# Patient Record
Sex: FEMALE | Race: WHITE
[De-identification: names, ages, dates, MRNs, and addresses within clinical notes are randomized per-mention and may not be internally consistent; named-entity substitution may affect disease eponyms.]

---

## 2020-11-12 NOTE — ST COGNITIVE LINGUISTIC EVAL
Speech Evaluation-General


Medical Diagnosis


CVA


Onset Date:  Oct 14, 2020





Therapy Diagnosis


Therapy Diagnosis:  Dysarthria, Dysphagia, Expressive Aphasia





Precautions


Precautions/Isolations:  Airborne Isolation





Referral


Referring Physician:  Dr. Petit





Medical History


Pertinent Medical History:  CAD, COPD, DM, PVD


Reviewed History:  Yes





Social History


Current Living Status:  Alone





Speech PLF-Current Status


Prior Level of Function





Patient lives alone and was independent for her daily needs prior to


illness.





Subjective


Patient was pleasant and cooperative with the cognitive assessment.





Language Eval: Auditory


Comprehends Simple Yes/No Ques:  Functional


Indent/Objects Multiple Fields:  Functional


Ident/Pics in Multiple Fields:  Functional


Follows 1-Step Commands:  Functional


Follows Complex Directions:  Mild


Follows General Conversations:  Functional





Language Eval: Verbal Language


Completes Spontaneous Greeting:  Functional


Produces Auto, Serial Info:  Mild


Imitates Simple Words/Phrases:  Mild


Word Finding:  Mild


Requests Basic Needs:  Mild


States Basic Personal Info:  Mild


Expresses Complex Ideas:  Moderate





Objective Cognitive Domain


Attention:  WNL


Memory:  Mild


Problem Solving:  Moderate, Mild


Executive Functions:  Moderate


Composite Severity Rating:  Moderate


Clock Drawing Severity Rating:  Moderate





Objective


Formal/Standardized Tests


[facility] Status ([facility]), WAB sub tests, informal speech tasks


Results


18/30 within Moderate Dementia Disorder range of function, however due to 

patient's expressive aphasia this score would be considered lower than actual, 

Moderate speech disorder


Oral Motor/Speech Production


Patient presents with dysarthria as well as expressive aphasia


Impression


Patient is a pleasant 74 y/o female who presents to the ARU s/p CVA. The patient

was given the SLUMS, WAB subtests and informal speech tasks. The patient scored 

an 18/30 which may be a lower score than actual. The patient's speech is clear 

and intelligible for 75% of her communication. She also mumbles and has 

difficulty expressing herself at times. Patient also has dysphagia and takes in 

some by oral and also the PEG tube is in place for nutrition. Patient will 

receive skilled ST to focus on expression, dysphagia and dysarthria.





Speech Patient Assess


Expression of Ideas/Wants:  Frequently (2)


Understanding Verbal Content:  Usually Understands (3)


Brief Interview-Mental Status:  Yes


Repetition of Three Words:  Three (3)


Temporal Orientation: Year:  Correct (3)


Temporal Orientation: Month:  Accurate within 5 days(2)


Temporal Orientation: Day:  Correct (1)


Recall : Wear to say "Sock":  No, could not recall (0)


Recall : Color:  No, could not recall (0)


Recall : Bed:  Yes,after cueing (1)


Memory/Recall Ability:  Current season, That he or she is in a hsp/hsp unit





Speech Short Term Goals


Short Term Goals


Short Term Goals


1) The patient will complete cognitive tasks related to memory, safety awareness

and problem solving at 80% with minimal cues.


2) The patient will complete speech tasks  to improve intelligibility at 80% 

with minimal cues.


3) The patient will complete confrontational naming tasks at 90% with minimal 

cues.


4) The patient will complete OME for improved oral status for safe oral intake 

at 80% or greater with minimal cues.





Speech Long Term Goals


Long Term Goals


Patient will improve communication abilities and safe oral intake in order to 

return to prior level.





Speech-Plan


Patient/Family Goals


Patient/Family Goals:  


Patient's discharge plans are unknown at this time.





Treatment Plan


Speech Therapy Treatment Plan:  Continue Plan of Care


Treatment Duration:  Nov 25, 2020


Frequency:  4 times per week (Patient will receive ST 4-5x per week)


Estimated Hrs Per Day:  .5 hour per day


Rehab Potential:  Fair


Barriers to Learning:  


Patient's recent CVA and related disabilities, age


Pt/Family Agrees to Plan:  Yes





Safety Risks/Education


Teaching Recipient:  Patient


Teaching Methods:  Discussion


Response to Teaching:  Verbalize Understanding


Education Topics Provided:  


Safety within her room, utilization of the call light as needed





Time


Speech Therapy Time In:  15:30


Speech Therapy Time Out:  16:00


Total Billed Time:  30


Billed Treatment Time


1, SE[facility] [address]            Nov 12, 2020 16:10

## 2020-11-12 NOTE — NUR
UNABLE TO ANSWER ADMISSION QUESTIONS DUE TO EXPRESSIVE APHASIA. DAUGHTER DAKOTA CALLED AND 
ANSWERED QUESTIONS. PATIENT AND DAUGHTER DO NOT KNOW IF SHE HAS HAD PNEUMONIA VACCINE.  
DAKOTA STATES PATIENT'S PHYSICIAN IS AT [facility] IN [place]. CLINIC CALLED AND THEY 
SAID THEY HAVE NOT SEEN HER SINCE 2016. UNABLE TO FILL OUT PNEUMONIA VACCINE INTERVENTION. 
DAKOTA ALSO SAYS SHE SHOULD BE A FULL CODE PRESENTLY, BUT SHE IS GOING TO TALK TO BROTHER 
ABOUT DPOA AND MAKING HER A DNR.

## 2020-11-12 NOTE — NUR
Admitted to room 229 , with an admitting diagnosis of CVA , on 11-12-20 from [facility] , 
accompanied by .TAYLOR CENTENO introduced to surroundings, call light, bed controls, phone, 
TV, temperature control, lights, meal times, smoking policy, visitor policy, side rail 
policy, bathrooms and showers.  Patient Rights given to patient in the 
handbook.TAYLOR CENTENO verbalizes understanding that [facility] is not responsible for 
the loss or damage to any personal effects or valuables that are kept in the patients 
posession during their hospitalization.  The following Patient Care Plans were discussed 
with the : Discharge Planning, ,, and . TAYLOR CENTENO verbalizes understanding of 
Interdisciplinary Patient Education. Patient and/or family were informed about the Rapid 
Response Team and its purpose. Patient received Patient Rights Booklet, which includes 
Privacy Act Statement and Data Collection Information Summary.

## 2020-11-12 NOTE — NUR
MED REC WAS ENTERED USING THE DISCHARGE ORDERS FROM [facility] IN [place]. AFTER 
MEDICATIONS ARE CONTINUED I WILL SPEAK WITH THE PT AND MAKE ANY CHANGES TO THE NOTES/MED REC 
IF NEEDED

-------------------------------------------------------------------------------

Addendum: 11/16/20 at 1556 by ANJELICA PEPPER [facility]

-------------------------------------------------------------------------------

SPOKE WITH THE PT TODAY REGARDING HER HOME MEDICATIONS, UNFORTUNATELY TAYLOR WAS NOT ABLE TO 
GIVE ME ANY INFORMATION. SHE WANTED ME TO REACH OUT TO HER DAUGHTER (DAKOTA). I CALLED 
DAKOTA AND HAD TO LEAVE A MESSAGE. 

-------------------------------------------------------------------------------

Addendum: 11/17/20 at 1114 by ANJELICA PEPPER [facility]

-------------------------------------------------------------------------------

I SPOKE WITH DAKOTA AND SHE THOUGHT TAYLOR WAS TAKING A MEDICATION FOR HER BLOOD SUGAR. WHEN 
I ASKED WHICH PRACTITIONER OR OFFICE THE PT WENT TO, DAKOTA NAMED [facility] IN [place]. I 
ALSO ASKED WHICH PHARMACY TAYLOR USED AND DAKOTA DIDNT KNOW. 

I CALLED [facility] IN [place] AND THEY INDICATED THAT TAYLOR IS NOT A CURRENT PT AND THEY 
HAD NOT SEEN HER SINCE 2016

FOR THE REASONS LISTED ABOVE I TOOK ALL MEDICATIONS OFF THE MED REC AND SET IT TO "NO 
MEDICATIONS". I ALSO DID LET DR. BAH KNOW OF WHAT I HAVE FOUND.

## 2020-11-12 NOTE — PHYSICAL THERAPY EVALUATION
PT Evaluation-General


Medical Diagnosis


Admission Date


2020 at 12:30


Medical Diagnosis:  CVA


Onset Date:  Oct 14, 2020





Therapy Diagnosis


Therapy Diagnosis:  Impaired mobility and strength





Precautions


Precautions/Isolations:  Fall Prevention, Standard Precautions





Weight Bear Status


Right Lower Extremity:  Right


Full Weight Bearing


Left Lower Extremity:  Left


Full Weight Bearing





Referral


Physician:  Damaso


Reason for Referral:  Evaluation/Treatment





Medical History


Pertinent Medical History:  CAD, COPD, DM, PVD


Reviewed History:  Yes





Social History


Home:  Single Level


Current Living Status:  Alone


Entry Into Home:  Level Entry


PT Steps Into Home:  0


PT Steps Inside Home:  0





Prior


Prior Level of Function


SCALE: Activities may be completed with or without assistive devices.





6-Indepedent-patient completes the activity by him/herself with no assistance 

from a helper.


5-Set-up or Clean-up Assistance-helper sets up or cleans up; patient completes 

activity. [place] assists only prior to or  


    following the activity.


4-Supervision or Touching Assistance-helper provides verbal cues and/or 

touching/steadying and/or contact guard assistance as patient completes 

activity. Assistance may be provided   


    throughout the activity or intermittently.


3-Partial/Moderate Assistance-helper does LESS THAN HALF the effort. [place] 

lifts, holds or supports trunk or limbs, but provides less than half the effort.


2-Substantial/Maximal Assistance-helper does MORE THAN HALF the effort. [place] 

lifts or holds trunk or limbs and provides more than half the effort.


2-Zuozuiiyp-yobqus does ALL the effort. Patient does none of the effort to 

complete the activity. Or, the assistance of 2 or more helpers is required for 

the patient to complete the  


    activity.


If activity was not attempted, code reason:


7-Patient Refused.


9-Not Applicable-not attempted and the patient did not perform the activity 

before the current illness, exacerbation or injury.


10-Not Attempted due to Environmental Limitations-(lack of equipment, weather 

restraints, etc.).


88-Not Attempted due to Medical Conditions or Safety Concerns.


Bed Mobility:  6


Transfers (B,C,W/C):  6


Gait:  6


Stairs:  6


Indoor Mobility (Ambulation):  Independent


Stairs:  Independent


Prior Devices Use:  Other-see list below (Cane)


Pt own walker that she does not use; pt has recently stated to use a cane but it

is unclear how recent and for what reason





PT Evaluation-Current


Subjective


Pt presents supine in bed. Pt agrees to PT. Pt reports no pain; later in session

pt experienced pain at catheter insertion with movement that diminished.





Pt/Family Goals


Return Home





Objective


Patient Orientation:  Person, Place, Time, Eyes Open, Situation, Mumbles


Attachments:  Oxygen, Roldan Catheter





ROM/Strength


ROM Lower Extremities


WFL


Strength Lower Extremities


R hip flex: <3/5 


L hip flex: 4/5


R knee ext: 3/5


L knee ext: 5/5





Sensory


Vision:  


Hearing:  Functional


Sensation Right Lower Extremit:  Impaired


Sensation Left Lower Extremity:  Intact


Sensation Lower Extremities


LLE sensation intact to light touch L2-S2


RLE sensation impaired to light touch; unclear on how well pt was able to follow

assessment instructions





Transfers


Roll Left & Right (QC):  3


Sit to Lying (QC):  3


Lying to Sitting/Side of Bed(Q:  2


Sit to Stand (QC):  3


Chair/Bed-to-Chair Xfer(QC):  3


Toilet Transfer (QC):  3


Car Transfer (QC):  3


Pt required min assist to roll; mod assist for sit->lying; max assist for lying-

>EOB. PT needs min assist with sit to stand; mod assist for chair to chair, 

toilet, and car transfers.





Gait


Does the Patient Walk?:  Yes


Mode of Locomotion:  Both


Anticipated Mode of Locomotion:  Both


Walk 10 feet (QC):  88


Walk 50 ft with 2 Turns(QC):  88


Walk 150 ft (QC):  88


Walking 10ft/uneven surface-QC:  88


Distance:  3'


Gait Assistive Device:  Parallel Bars


Comments/Gait Description


Pt ambulated 3' inside of parallel bars; pt took small shuffling steps and 

required assistance with weightshifting as well as progression of RLE.





Wheelchair Training


Does the Pt Use a Wheelchair?:  Yes


Distance:  150'x3


Wheel 50 ft with 2 turns (QC):  1


Wheel 150 ft (QC):  1


Type of Wheelchair:  Manual


Pt is unable to reach ground with BLE to propel and steer WC





Stairs


1 Step (curb) (QC):  88


4 Steps (QC):  88


12 Steps (QC):  88





Balance


Sitting Static:  Fair


Sitting Dynamic:  Poor


Standing Static:  Poor


 Standing Dynamic:  Poor


Picking up an Object (QC):  88





Treatment


Seated LAQs and ankle pumps x10


Showering and ADLs.





Assessment/Needs


Pt demonstrates ability to stand and bear weight but struggles to then pivot to 

transfer; pt appeared fearful of reaching for armrest with LUE and instead would

hug onto therapist. Pt inconsistent with her ability to follow instructions with

exercises.


Rehab Potential:  Fair





PT Short Term Goals


Short Term Goals


Time Frame:  2020


Roll Left & Right:  4


Sit to lyin


Lying to sitting on side of be:  3


Chair/bed-to-chair transfer:  3


Walk 10 feet:  3


Does pt use a wc or scooter:  Yes


Wheel 50ft w/2 turns:  3


Wheel 150 feet:  3





PT Long Term Goals


Long Term Goals


PT Long Term Goals Time Frame:  Dec 3, 2020


Roll Left & Right (QC):  6


Sit to Lying (QC):  6


Lying-Sitting on Side/Bed(QC):  6


Sit to Stand (QC):  4


Chair/Bed-to-Chair Xfer(QC):  4


Toilet Transfer (QC):  4


Car Transfer (QC):  4


Does the Patient Walk:  Yes


Walk 10 feet (QC):  3


Walk 50ft with 2 Turns (QC):  3


Walk 150 ft (QC):  88


Walking 10ft on Uneven Surface:  3


1 Step (curb) (QC):  3


4 Steps (QC):  88


12 Steps (QC):  88


Picking up an Object (QC):  88


Does the Pt use WC or Scooter?:  Yes


Wheel 50 feet with 2 turns (QC:  6


Wheel 150 feet:  6





PT Plan


Problem List


Problem List:  Activity Tolerance, Functional Strength, Safety, Balance, Gait, 

Transfer, Bed Mobility, ROM





Treatment/Plan


Treatment Plan:  Continue Plan of Care


Treatment Plan:  Bed Mobility, Education, Functional Activity Nona, Functional 

Strength, Group Therapy, Gait, Safety, Therapeutic Exercise, Transfers


Treatment Duration:  Dec 4, 2020


Frequency:  At least 5 of 7 days/Wk (IRF)


Estimated Hrs Per Day:  1.5 hours per day


Patient and/or Family Agrees t:  Yes





Safety Risks/Education


Patient Education:  Gait Training, Transfer Techniques, Correct Positioning, W/C

Management, Safety Issues


Teaching Recipient:  Patient


Teaching Methods:  Demonstration, Discussion


Response to Teaching:  Reinforcement Needed





Discharge Recommendations


Plan


Pt will work on bed mobility, transfers, balance, gait training, and therapeutic

exercises.





Time/GCodes


Time In:  1320


Time Out:  1435


Total Billed Treatment Time:  75


Total Billed Treatment


1 visit


EVM 10'


FA 50'


EX 15'





Pt Eval [id number]; Cotreated with OT 5095-1832; Pt focused on mobility and 

transfers while OT assisted with showering and ADLs.











MELODY RAO PT                2020 14:35

## 2020-11-12 NOTE — OCCUPATIONAL THERAPY EVAL
OT Evaluation-General/PLF


Medical Diagnosis


Admission Date


2020 at 12:30


Medical Diagnosis:  CVA


Onset Date:  Oct 14, 2020





Therapy Diagnosis


Therapy Diagnosis:  Right sided weakness, aphasia





Precautions


Precautions/Isolations:  Fall Prevention, Standard Precautions





Referral


Physician:  Damaso Kam Reason:  Activity Tolerance, Self Care, Evaluation/Treatment, 

Strengthening/ROM





Medical History


Pertinent Medical History:  CAD, COPD, DM, PVD


Additional Medical History


Myopathy, Dysphagia, dysarthria


Current History


Pt. 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


Reviewed History:  Yes





Social History


Home:  Single Level


Current Living Status:  Alone


Entry Into Home:  Level Entry


 Steps Into Home:  0


 Steps Inside Home:  0





ADL-Prior Level of Function


SCALE: Activities may be completed with or without assistive devices.





6-Indepedent-patient completes the activity by him/herself with no assistance 

from a helper.


5-Set-up or Clean-up Assistance-helper sets up or cleans up; patient completes 

activity. [place] assists only prior to or  


    following the activity.


4-Supervision or Touching Assistance-helper provides verbal cues and/or 

touching/steadying and/or contact guard assistance as patient completes 

activity. Assistance may be provided   


    throughout the activity or intermittently.


3-Partial/Moderate Assistance-helper does LESS THAN HALF the effort. [place] 

lifts, holds or supports trunk or limbs, but provides less than half the effort.


2-Substantial/Maximal Assistance-helper does MORE THAN HALF the effort. [place] 

lifts or holds trunk or limbs and provides more than half the effort.


6-Wgfovfxrb-muezeh does ALL the effort. Patient does none of the effort to compl

ete the activity. Or, the assistance of 2 or more helpers is required for the 

patient to complete the  


    activity.


If activity was not attempted, code reason:


7-Patient Refused.


9-Not Applicable-not attempted and the patient did not perform the activity 

before the current illness, exacerbation or injury.


10-Not Attempted due to Environmental Limitations-(lack of equipment, weather 

restraints, etc.).


88-Not Attempted due to Medical Conditions or Safety Concerns.


ADL PLOF Comments


Pt. was independent with daily skills.  She worked part time at [employer].  She 

reports that she was in the [employer].


Self Care:  Independent


Functional Cognition:  Independent


DME/Equipment Comments


Pt. verbalizes that she has a walker she doesn't use, and was using a cane 

occasionally prior to this incident.


Occupation:  Part time at [employer].


Drive Self:  Yes





OT Current Status


Subjective


No pain reported.





Appearance


Pt. in bed.  Alert.  Agrees to work with therapy.





Mental Status/Objective


Patient Orientation:  Person, Place


Attachments:  Roldan Catheter, Oxygen





Current


Glasses/Contacts:  Yes


Upper Extremity ROM


Left- intact


Right- impaired.  Pt. is able to lift right arm at shoulder, approximately 30 

degrees,but this gets more difficult the more tired she becomes.  Pt. able to 

slightly grasp with right hand.


Upper Extremity Strength


Left- WFL


Right- 2+/5  strength


Edema:  Pt. does have edema in right hand.





ADL-Treatment


Eating (QC):  7 (Pt. to be evaluated by ST.)


Oral Hygiene (QC):  1 (OT handed pt. toothbrush while she was in shower, with no

toothpaste.  Pt. put the handle of it in her mouth.  OT attempted [place] assist, 

but pt. unable to understand.  OT brushes pt's teeth for her.)


Shower/Bathe Self (QC):  2 (Pt. is able to wash her chest and right arm.  She 

requires assistance to wash all other parts.  Pt. showers while seated on shower

chair.)


Upper Body Dressing (QC):  88 (Pt. verbalizes that she is tired.  OT assists 

with doffing/donning clean hospital gown.)


Lower Body Dressing (QC):  88


On/Off Footwear (QC):  1 (Pt. able to extend her legs for OT to doff/don slipper

socks.)


Toileting Hygiene (QC):  1 (Catheter.  Pt. verbalizes that she is continent of 

stool, but pt. slightly incontinent in shower.)





Other Treatments


OT/PT completed partial co-treatment due to pt's fatigue level, as well as need 

for skilled assistance x 2.  Pt. arrived via ambulance, but agrees to 

participate.  OT focused on ADL skills and UE assessement while PT focused on 

transfers and mobility.  Pt. practiced car transfer, transfer to shower chair 

and shower, as well as ambulation in parallel bars.  Pt. greatly fatigued 

throughout.  Required max x 2 to ambulate 10 feet in bars.  Pt. demonstrates 

fear of movement and falling while standing, and "pushes" when transferring to 

left side.  Pt. motivated and pleasant.  Seems aware of deficits, but 

demonstrates some right sided neglect.  Pt. able to use clear words at beginning

of treatment.  Noted by end of treatment, due to fatigue, her words were garbled

and less intelligible.  Pt. was transferred to lift chair after session with max

assist.  All needs met.





Education


OT Patient Education:  Correct positioning, Exercise program, Modified ADL 

techniques, Progress toward Goal/Update tx plan, Purpose of tx/functional 

activities, Reviewed precautions, Rehab process, Transfer techniques


Teaching Recipient:  Patient


Teaching Methods:  Demonstration, Discussion


Response to Teaching:  Verbalize Understanding, Return Demonstration, Reinfo

rcement Needed





OT Short Term Goals


Short Term Goals


Time Frame:  2020


Eating:  3


Oral hygiene:  3


Toileting hygiene:  3


Shower/bathe self:  3


Upper body dressin


Lower body dressing:  3


Putting on/taking off footwear:  3





OT Long Term Goals


Long Term Goals


Time Frame:  Dec 10, 2020


Eating (QC):  4


Oral Hygiene (QC):  4


Toileting Hygiene (QC):  3


Shower/Bathe Self (QC):  3


Upper Body Dressing (QC):  5


Lower Body Dressing (QC):  4


On/Off Footwear (QC):  4


Additional Goals:  1-Demonstrate ADL Tasks, 2-Verbalize Understanding, 3-

ImproveStrength/Nona


1=Demonstrate adherence to instructed precautions during ADL tasks.


2=Patient will verbalize/demonstrate understanding of assistive 

devices/modifications for ADL.


3=Patient will improve strength/tolerance for activity to enable patient to 

perform ADL's.





OT Education/Plan


Problem List/Assessment


Assessment:  Decreased Activ Tolerance, Decreased UE Strength, Dependent 

Transfers, Edema, Impaired Bed Mobility, Impaired Cognition, Impaired 

Coordination, Impaired Funct Balance, Impaired I ADL's, Impaired Self-Care 

Skills, Restricted Funct UE ROM, Visual-Perceptual Deficit





Discharge Recommendations


Plan/Recommendations:  Continue POC


Therapy Discharge Recommendati:  Post Acute OT


Comment


Equipment needs and discharge location to be determined.





Treatment Plan/Plan of Care


Treatment,Training & Education:  Yes


Patient would benefit from OT for education, treatment and training to promote 

independence in ADL's, mobility, safety and/or upper extremity function for 

ADL's.


Plan of Care:  ADL Retraining, Functional Mobility, Group Exercise/Act as Ind, 

UE Funct Exercise/Act


Treatment Duration:  Dec 10, 2020


Frequency:  At least 5 of 7 days/Wk (IRF)


Estimated Hrs Per Day:  1.5 hours per day


Agreement:  Yes


Rehab Potential:  Fair





Time/GCodes


Start Time:  12:55


Stop Time:  14:35


Total Time Billed (hr/min):  80


Billed Treatment Time


1699-1402 1, EVH x 15minutes


3250-1224 Med student assessment, no charge


[id number] PT eval, no charge


[id number] 1, ADL x 30minutes, FA x 15minutes, Ex x 20minutes (Co-treatment with 

PT.  Please see above note for designated roles.)











ROSANNA LOVING OT           2020 15:47

## 2020-11-12 NOTE — PM&R POST ADMISSION ASSESSMENT
PM&R HP


Date of Visit:  2020


Time of Visit:  12:45


History of Present Illness


CC: CVA





HPI: This is a 75yoWF patient who presents from LTAC in [place] where she was 

admitted after suffering a CVA with right sided weakness and dysarthria and 

expressive aphasia. PEG tube is in place. Patient denies pain. Patient had an 

uneventful transport from [place]. Roldan cath is in place and unsure if she has 

retention. Last BM is unknown at this time. Reviewed below information from 

medical student.





Verification and Attestation of Medical Student E/M Service





A medical student performed and documented this service in my presence. I 

reviewed and verified all information documented by the medical student and made

modifications to such information, when appropriate. I personally performed the 

physical exam and medical decision making. 





 Nancy Bah, 2020,20:59


  








History and Physical by Luke Bainbridge, MSIII





Subjective:





CC: CVA





HPI: Lupe Williamson is a 75 year old white female who presents to inpatient rehab

from [facility] after sustaining a CVA involving the left 

occipital artery after undergoing CABG x 3 vessels on . PMH is 

significant for CAD, diabetes mellitus, PVD, and COPD. Following surgery she was

transferred to [facility]. While a patient there she was found to

have right lower extremity coldness and pulses in the RLE were undetectable with

doppler. She was started on an IV heparin drip and was transferred to [place] 

and vascular surgery was consulted. She underwent a bilateral lower extremity 

angiogram and was recommended to continue medical management. She was then 

transferred back to [facility] for further care. As a result of 

the stroke she now has right sided weakness, dysarthria, expressive aphasia, and

dysphagia. Prior to her CVA she was independent in all activities of daily 

living without limitations. She is admitted to inpatient rehab here at [facility] with critical illness myopathy and will require intensive PT, OT, SLP, 

and nursing care. 





PMH: CAD, diabetes mellitus, PVD, and COPD-non 02 dependent





PSH: CABG x 3 vessels, PEG per transfer paperwork-not present currently





Allergies: NKDA





Medications: ASA 81 mg po Q day, Lipitor 40 mg Q day, Coreg 3.25 mg BID, Lantus 

15 units SubQ Q day, Lispro sliding scale, Entresto 1 tab po Q day





FH: Negative for cancer or heart disease





SH: Former smoker. Single. Lives alone. Works part time at [employer]. 





ROS: Negative for fevers, chills, chest pain, SOB, nausea, vomiting, diarrhea, 

headache, blurry vision, dysuria. Positive for Right sided weakness. 





Objective:





T     P     RR     BP    SPO2





General: Chronically ill appearing female appearing stated age in no apparent 

distress. 





HEENT: Normocephalic/Atraumatic. PERRL. EOMI. Pharynx without erythema or exuda

zachery. Poor dentition.





Cardiac: RRR. No murmur, rub, or gallop. Cap refill < 2 seconds bilateral hands.

Radial pulses +2/4 bilat. Pedal pulses +1/4bilat. Trace edema BLE. 





Respiratory: Lungs CTAB, but diminished bibasilar. No rales, rhonchi or wheezes.

No accessory muscle use. O2 via NC. 





GI: Abdomen soft and nontender to palpation. No rebound or guarding. Bowel 

sounds normoactive all quadrants. 





: Roldan catheter present





Skin: Warm and dry. Midline sternotomy incision healing, without drainage, edges

well approximated, open to air. 





MSK/Neuro: Left palmar grasp 5/5. Right palmar grasp 3/5. Left lower extrem hip 

flexion 5/5. Right lower extrem  hip flexion 3/5. Left foot plantarflexion 5/5. 

Right foot plantarflexion 3/5. Sensation to bilateral upper and lower 

extremities intact. Cranial nerves 2-12 grossly intact. Expressive aphasia  

noted. Patient leaning slightly to the right while lying in bed. 





Assessment:





Critical illness myopathy


S/P CVA involving left occipital artery


S/P 3 vessel CABG


Debility


Expressive aphasia


Dysarthria


Right sided weakness


Diabetes mellitus


COPD


CAD





Plan:





1. Ensure adequate anticoagulation


2. PT, OT, and SLP eval's and treat


3. Oxygen via NC- wean as tolerated


4. Resume Home medications


5. Fall precautions


6. Dietary consult


7. DVT prophylaxis








Past Medical-Social-Family Hx


Past Med/Social Hx:  Reviewed Nursing Past Med/Soc Hx, Reviewed and Corrections 

made


Patient Social History


Marrital Status:  single


Employed/Student:  student, part-time


Alcohol Use:  Denies Use


Recreational Drug Use:  No


Smoking Status:  Current Everyday Smoker


Type Used:  Cigarettes


Physical Abuse Screen:  No


Sexual Abuse:  No


Recent Foreign Travel:  No


Contact w/other who traveled:  No


Recent Hopitalizations:  Yes (CVA AND BYPASS SURGERY)


Recent Infectious Disease Expo:  No





Immunizations Up To Date


Date of Influenza Vaccine:  Nov 10, 2020





Seasonal Allergies


Seasonal Allergies:  No





Past Medical History


Surgeries:  Cardiac, CABG, Open Heart Surgery


Respiratory:  COPD


Currently Using CPAP:  No


Currently Using BIPAP:  No


Cardiac:  Cardiomyopathy, Coronary Artery Disease, High Cholesterol, 

Hypertension


Neurological:  Stroke


Pregnant:  No


Sexually Transmitted Disease:  No


HIV/AIDS:  No


Musculoskeletal:  Arthritis


Endocrine:  Diabetes, Non-Insulin dep


Hearing Impairment:  Denies


History of Blood Disorders:  No





Family History





Patient reports no known family medical history.





Prior Level of Function


Bed Mobility:  6


Transfers:  6


Gait:  6


Stairs:  6


Indoor Mobility (Ambulation):  Independent


Stairs:  Independent


Prior Devices Use:  Other-see list below (Cane)


Self Care:  Independent


Functional Cognition:  Independent


Occupation:  Part time at [employer].


Drive Self:  Yes





Current Level of Fuctioning


Roll Left to Right:  3


Sit to Lying:  3


Lying to Sitting/Side of Bed:  2


Sit to Stand:  3


Chair/Bed-to-Chair Xfer:  3


Car Transfer:  3


Does the Patient Walk:  Yes


Mode of Locomotion:  Both


Anticipated Mode of Locomotion:  Both


Walk 10 feet:  88


Walk 50 ft with 2 Turns:  88


Walk 150 ft:  88


Walking 10ft on uneven surface:  88


Gait Assistive Device:  Parallel Bars


Does the Pt Use a Wheelchair:  Yes


Wheelchair Distance:  150'x3


Wheel 50 ft with 2 turns:  1


Wheel 150 ft:  1


Type of Wheelchair:  Manual


1 Step (curb):  88


4 Steps:  88


12 Steps:  88


Picking up an Object:  88


Eatin (Pt. to be evaluated by ST.)


Oral Hygiene:  1 (OT handed pt. toothbrush while she was in shower, with no 

toothpaste.  Pt. put the handle of it in her mouth.  OT attempted [place] assist, 

but pt. unable to understand.  OT brushes pt's teeth for her.)


Shower/Bathe Self:  2 (Pt. is able to wash her chest and right arm.  She 

requires assistance to wash all other parts.  Pt. showers while seated on shower

chair.)


Upper Body Dressin (Pt. verbalizes that she is tired.  OT assists with 

doffing/donning clean hospital gown.)


Lower Body Dressin


On/Off Footwear:  1 (Pt. able to extend her legs for OT to doff/don slipper 

socks.)


Toileting Hygiene:  1 (Catheter.  Pt. verbalizes that she is continent of stool,

but pt. slightly incontinent in shower.)





PM&R Allergy/Meds/Data Review


Allergies


Coded Allergies:  


     No Known Drug Allergies (Unverified , 20)





Home Medications


Scheduled


Aspirin (Aspirin), 81 MG PO DAILY, (Reported)


Atorvastatin Calcium (Atorvastatin Calcium), 40 MG PO DAILY, (Reported)


Budesonide (Budesonide), 0.5 MG IH BID, (Reported)


Carvedilol (Carvedilol), 3.125 MG PO BID, (Reported)


Famotidine (Acid Reducer (FAMOTIDINE)), 20 MG PO Q12H, (Reported)


Insulin Glargine,Hum.rec.anlog (Lantus), 25 UNIT SQ HS, (Reported)


Insulin Lispro (Humalog), UNIT SQ Q6H, (Reported)





Scheduled PRN


Acetaminophen (Tylenol), 650 MG PO Q6H PRN for PAIN-MILD (1-4), (Reported)


Albuterol Sulfate (Albuterol Sulfate), 2.5 MG INH Q4H PRN for SHORTNESS OF 

BREATH, (Reported)


Ipratropium/Albuterol Sulfate (Iprat-Albut 0.5-3(2.5) mg/3 ml), 3 ML IH Q6H PRN 

for SHORTNESS OF BREATH, (Reported)





Current Medications


Current Medications


Reviewed





Laboratory Data


Laboratory Tests


20 16:54: Glucometer 73





Review of Systems


Constitutional:  see HPI, malaise, weakness


Gastrointestinal:  constipation


Genitourinary:  other (retention)


Psychiatric/Neurological:  Anxiety, Depressed


All Other Systems Reviewed


Negative Unless Noted:  Yes





Physical Exam


Physical Exam


Vital Signs





Vital Signs - First Documented








 20





 15:00


 


Temp 35.9


 


Pulse 80


 


Resp 20


 


B/P (MAP) 140/81 (100)


 


Pulse Ox 99


 


O2 Delivery Nasal Cannula


 


O2 Flow Rate 1.50





Capillary Refill :


Height, Weight, BMI


Height: '"


Weight: lbs. oz. kg; 25.27 BMI


Method:


General Appearance:  No Apparent Distress, Anxious, Chronically ill, Thin


Eyes:  Bilateral Eye Normal Inspection, Bilateral Eye PERRL


HEENT:  PERRL/EOMI, Normal ENT Inspection, Pharynx Normal


Neck:  Full Range of Motion, Normal Inspection, Non Tender, Supple, Carotid 

Bruit


Respiratory:  Chest Non Tender, Lungs Clear, No Accessory Muscle Use, No 

Respiratory Distress, Decreased Breath Sounds


Cardiovascular:  Regular Rate, Rhythm, No Edema, No Gallop, No JVD, No Murmur, 

Normal Peripheral Pulses


Gastrointestinal:  Normal Bowel Sounds, No Organomegaly, No Pulsatile Mass, Non 

Tender, Soft


Back:  Normal Inspection, No CVA Tenderness, No Vertebral Tenderness


Extremity:  Normal Capillary Refill, Normal Inspection, Normal Range of Motion 

(except right sided weakness), Non Tender, No Calf Tenderness, No Pedal Edema


Neurologic/Psychiatric:  Alert, No Motor/Sensory Deficits, Normal Mood/Affect, 

Abnormal CNs II-XII, Aphasia, Facial Droop, Motor Weakness (right sided 

flaccidity)


Skin:  Normal Color, Warm/Dry


Lymphatic:  No Adenopathy





PM&R Medical Assessment & Plan


REHAB/MEDICAL ASSESSMENT AND PLAN:





REHAB IMPAIRMENT GROUP: 


CVA





ETIOLOGIC DIAGNOSIS: 


CVA





The comorbidities that impact the patients function and/or functional outcome 

by: catastrophic CVA with right sided flaccidity of dominant upper extremity, 

dysarthria, expressive aphasia





REHAB PLAN:


The patient is being admitted to our comprehensive inpatient rehabilitation 

facility and can tolerate the intensity of service consisting of at least:


      180 minutes of therapy a day, 5 out of 7 days a week 


    


Rehab treatment will consist of:  PT OT will help regain function with right 

sided weakness in order to lessen the burden on caretakers and ST will help 

dysphagia and dysarthria





The patient/family has a good understanding of our discharge process and will 

benefit from an interdisciplinary inpatient rehabilitation program. The patient 

has potential to make improvement and is in need of at least two of the 

following multidisciplinary therapies including but not limited to physical, 

occupational, speech, and prosthetics and orthotics.  Additionally the patient 

will need services from respiratory, nutritional services, wound care, 

psychology, etc. (Customize this to each patient). Given the patients complex 

condition and risk of further medical complications, rehabilitation services 

cannot be safely or effectively provided at a lower level of care such as a 

skilled nursing facility.





BARRIERS TO DISCHARGE:


Catastrophic CVA with right sided flaccidity





ESTIMATED LOS:


14 days





DISPOSITION:


Home





RELEVANT CHANGES SINCE PREADMISSION SCREENING: 


I have compared the patients medical and functional status at the time of the 

preadmission screening and there are: 


      no changes





PROGNOSIS:


Fair





REHABILITATION GOALS:  


1. PT OT will help regain function with right sided weakness in order to lessen 

the burden on caretakers and ST will help dysphagia and dysarthria








All the above goals were reviewed with the patient and he/she is in agreement.


By signing this document, I acknowledge that I have personally performed a full 

physical examination on this patient within 24 hours of admission to this 

inpatient rehabilitation facility and have determined the patient to be able to 

tolerate the above course of treatment at an intensive level for a reasonable 

period of time. I will be completing a detailed individualized Plan of Care for 

this patient by day #4 of the patients stay based upon the Preadmission Screen,

the Post-Admission Evaluation, and the therapy evaluations.


Admission Dx/Comorbidities:  


(1) CVA (cerebral vascular accident)


ICD Codes:  I63.9 - Cerebral infarction, unspecified


(2) Dysarthria


ICD Codes:  R47.1 - Dysarthria and anarthria


(3) Dysphagia


ICD Codes:  R13.10 - Dysphagia, unspecified


(4) PEG (percutaneous endoscopic gastrostomy) status


ICD Codes:  Z93.1 - Gastrostomy status


(5) Expressive aphasia


ICD Codes:  R47.01 - Aphasia


(6) Smoker


ICD Codes:  F17.200 - Nicotine dependence, unspecified, uncomplicated


(7) COPD (chronic obstructive pulmonary disease)


ICD Codes:  J44.9 - Chronic obstructive pulmonary disease, unspecified


(8) CAD (coronary artery disease)


ICD Codes:  I25.10 - Atherosclerotic heart disease of native coronary artery 

without angina pectoris


(9) Hx of CABG


ICD Codes:  Z95.1 - Presence of aortocoronary bypass graft


(10) Roldan catheter in place


ICD Codes:  Z97.8 - Presence of other specified devices


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


ICD Codes:  R33.9 - Retention of urine, unspecified





Assessment/Plan


Assessment and Plan


Assess & Plan/Chief Complaint


Assessment per Luke Bainbridge, MSIII:


Critical illness myopathy


S/P CVA involving left occipital artery


S/P 3 vessel CABG


Debility


Expressive aphasia


Dysarthria


Right sided weakness


Diabetes mellitus


COPD


CAD


Dysphagia requiring PEG


Roldan cath in place





Plan:


1. Ensure adequate anticoagulation


2. PT, OT, and SLP eval's and treat


3. Oxygen via NC- wean as tolerated


4. Resume Home medications


5. Fall precautions


6. Dietary consult


7. DVT prophylaxis











NANCY BAH DO                2020 17:52

## 2020-11-12 NOTE — PROGRESS NOTE
BAINBRIDGE,LUKE MED STUDENT 11/12/20 1359:


Progress Note


History and Physical





Subjective:





CC: CVA





HPI: Lupe Williamson is a 75 year old white female who presents to inpatient rehab

from [facility] after sustaining a CVA involving the left 

occipital artery after undergoing CABG x 3 vessels on October 25. PMH is 

significant for CAD, diabetes mellitus, PVD, and COPD. Following surgery she was

transferred to [facility]. While a patient there she was found to

have right lower extremity coldness and pulses in the RLE were undetectable with

doppler. She was started on an IV heparin drip and was transferred to [place] 

and vascular surgery was consulted. She underwent a bilateral lower extremity 

angiogram and was recommended to continue medical management. She was then 

transferred back to [facility] for further care. As a result of 

the stroke she now has right sided weakness, dysarthria, expressive aphasia, and

dysphagia. Prior to her CVA she was independent in all activities of daily 

living without limitations. She is admitted to inpatient rehab here at [facility]

sti with critical illness myopathy and will require intensive PT, OT, SLP, and 

nursing care. 





PMH: CAD, diabetes mellitus, PVD, and COPD-non 02 dependent





PSH: CABG x 3 vessels, PEG per transfer paperwork-not present currently





Allergies: NKDA





Medications: ASA 81 mg po Q day, Lipitor 40 mg Q day, Coreg 3.25 mg BID, Lantus 

15 units SubQ Q day, Lispro sliding scale, Entresto 1 tab po Q day





FH: Negative for cancer or heart disease





SH: Former smoker. Single. Lives alone. Works part time at [employer]. 





ROS: Negative for fevers, chills, chest pain, SOB, nausea, vomiting, diarrhea, 

headache, blurry vision, dysuria. Positive for Right sided weakness. 





Objective:





T     P     RR     BP    SPO2





General: Chronically ill appearing female appearing stated age in no apparent 

distress. 





HEENT: Normocephalic/Atraumatic. PERRL. EOMI. Pharynx without erythema or 

exudates. Poor dentition.





Cardiac: RRR. No murmur, rub, or gallop. Cap refill < 2 seconds bilateral hands.

Radial pulses +2/4 bilat. Pedal pulses +1/4bilat. Trace edema BLE. 





Respiratory: Lungs CTAB, but diminished bibasilar. No rales, rhonchi or wheezes.

No accessory muscle use. O2 via NC. 





GI: Abdomen soft and nontender to palpation. No rebound or guarding. Bowel 

sounds normoactive all quadrants. 





: Roldan catheter present





Skin: Warm and dry. Midline sternotomy incision healing, without drainage, edges

well approximated, open to air. 





MSK/Neuro: Left palmar grasp 5/5. Right palmar grasp 3/5. Left lower extrem hip 

flexion 5/5. Right lower extrem  hip flexion 3/5. Left foot plantarflexion 5/5. 

Right foot plantarflexion 3/5. Sensation to bilateral upper and lower 

extremities intact. Cranial nerves 2-12 grossly intact. Expressive aphasia  

noted. Patient leaning slightly to the right while lying in bed. 





Assessment:





Critical illness myopathy


S/P CVA involving left occipital artery


S/P 3 vessel CABG


Debility


Expressive aphasia


Dysarthria


Right sided weakness


Diabetes mellitus


COPD


CAD





Plan:





1. Ensure adequate anticoagulation


2. PT, OT, and SLP eval's and treat


3. Oxygen via NC- wean as tolerated


4. Resume Home medications


5. Fall precautions


6. Dietary consult


7. DVT prophylaxis





NANCY BAH DO 11/12/20 2055:


Supervisory-Addendum Brief


Verification & Attestation


Participated in pt care:  history, MDM, physical


Personally performed:  exam, history, MDM, supervision of care


Care discussed with:  Medical Student


Procedures:  n/a


Results interpretation:  Verified all documentation


Verification and Attestation of Medical Student E/M Service





A medical student performed and documented this service in my presence. I 

reviewed and verified all information documented by the medical student and made

modifications to such information, when appropriate. I personally performed the 

physical exam and medical decision making. 





 Nancy Bah, Nov 12, 2020,20:55











BAINBRIDGE,LUKE MED STUDENT    Nov 12, 2020 13:59


NANCY BAH DO                Nov 12, 2020 20:55

## 2020-11-13 NOTE — NUR
CM/SS ADMISSION

Patient was admitted to ARU from [facility] in [place] 11/12/20 for CVA 
with right sided deficits.  Her daughter Monik reports she presented to [facility] 9/28 
and was transferred to [facility] 9/29.  She has been 
hospitalized ever since.  Monik reports she had a CVA following a CABG x 4, prior to that 
she was completely independent of all activities.  Additional diagnoses include, in part, 
dysarthria, dysphagia, PEG, expressive aphasia, COPD, tobaccoism, CAD, diabetes, retention 
of urine.  Patient transfers include [facility] to [facility] to LT to [facility] 
to LT, then ARU.



Daughter Monik Chahal has moved forward to create a room at her home for patient, the plan 
is that she will reside there with Monik and her spouse.  Prior to that patient resided 
alone and was completely self sufficient.



PCP:  Not confirmed at this time, Monik is unsure who patient's physician is.  She 
indicates patient was on diabetic oral medication.  Indications are she had history with 
[facility] in [place] but nursing reports they called and were told patient had not been 
there since 2016.



PHARMACY:  Unknown at this time.  Family to explore home Rx to assist with prescribing 
physician and supplying pharmacy as it relates to moving forward.



INSURANCE:  Patient has Medicare, Monik believes [facility] is applying for 
Medicaid for patient, hospital contact is Estefany, [phone], will explore.  Assume they 
are applying in [place] and patient will be a [place] resident.



DME:  Patient has none, never needed.



BARRIERS TO DISCHARGE PLANNING:  Daughter is preparing living space for patient in her home, 
patient's maximum level of independence will be crucial.  Monik and her spouse both work 
full time.  Coordination of living environment for patient's specific performance and 
safety.



Care and treatment should include patient's emotional/behavior health well being.  She has 
gone from fully independent life to current disabilities.  Monik said the neurologist told 
them she would never drive or be able to live alone and they did discuss this with patient. 
Monik describes patient was very emotional with this news.



CONTACTS:

Abad Williamson, Son

[id number]

[place]  [address] [phone]



Monik Chahal, Daughter

[address] [place]  [address] [phone]



Patient does not have a POA at this time; however, Monik's spouse is [facility], they are tentatively pursuing a Guardianship for patient.



Monik understands the purpose and process of the weekly patient care conference and that 
her mom's first review will be Wednesday, November 18, 2020.

## 2020-11-13 NOTE — INDIVIDUALIZED PLAN OF CARE
Individualized Plan of Care


Rehab Nursing IPOC Order


Admission Date


Nov 12, 2020 at 12:30


Current Orders





Orders


Admission Order(Inpt,Obs,Sdc) (11/12/20 05:23)


Vital Signs: Per Unit Policy ( 08,16,00 (11/12/20 05:23)


Florencio Ordonez 09,21 (11/12/20 05:23)


Sequential Compression Device Q4H (11/12/20 05:23)


-Inpt Rehab Con (11/12/20 05:23)


Rehab Nursing Orders-Ipoc (11/12/20 05:23)


Physical Therapy Rehab Orders (11/12/20 05:23)


Occupational Therapy Rehab Ord (11/12/20 05:23)


Speech Therapy Rehab Orders (11/12/20 05:23)


Cbc With Automated Diff (11/13/20 06:00)


Comprehensive Metabolic Panel (11/13/20 06:00)


Intake & Output 06,14,22 (11/12/20 05:23)


Precautions (Aru) (11/12/20 05:23)


Rehab-Intensity Of Therapy (11/12/20 05:23)


Initiate Admission Nursing Pro .admission (11/12/20 05:23)


Acetaminophen  Tablet (Tylenol  Tablet) (11/12/20 05:30)


Alprazolam Tablet (Xanax Tablet) (11/12/20 05:30)


Calcium Carbonate Chew Tablet (Antacid C (11/12/20 05:30)


Diphenhydramine Tablet (Benadryl Tablet) (11/12/20 05:30)


Docusate Sodium Capsule (Colace Capsule) (11/12/20 09:00)


Docusate Sodium Capsule (Colace Capsule) (11/12/20 05:30)


Bisacodyl Suppository (Dulcolax Supposit (11/12/20 05:30)


Lactulose Oral Solution (Enulose Oral So (11/12/20 05:30)


Na Phos/Na Biphos Enema (Fleet Enema Mikhail (11/12/20 05:30)


Guaifenesin/Codeine Syrup (Robitussin Ac (11/12/20 05:30)


Loperamide Tablet (Imodium Tablet) (11/12/20 05:30)


Melatonin  Tablet (Melatonin Tablet) (11/12/20 05:30)


Polyethylene Glycol Powder Pkt (Miralax (11/12/20 09:00)


Ondansetron  Oral Dissolve Tab (Zofran (11/12/20 05:30)


Senna S Tablet (Senokot S Tablet) (11/12/20 09:00)


Initiate Admission Nursing Pro .admission (11/12/20 05:23)


Aspirin Chewable Tablet (Baby Aspirin Ch (11/13/20 09:00)


Atorvastatin Tablet (Lipitor) (11/13/20 09:00)


Budesonide Inhalation Solution (Pulmicor (11/12/20 21:00)


Carvedilol  Tablet (Coreg  Tablet) (11/12/20 21:00)


Famotidine Tablet (Pepcid Tablet) (11/12/20 12:15)


Albuterol/Ipra Inhalation Soln (Duoneb I (11/12/20 12:15)


(Nf) Acetaminophen (Tylenol) (11/12/20 12:15)


(Nf) Insulin Glargine,Hum.Rec.Anlog (Chente (11/12/20 21:00)


Accucheck Achs ACHS (11/12/20 12:06)


Insulin Aspart (Novolog) (Novolog (Charg (11/12/20 16:00)


Admission Arrival Bed Request (11/12/20 12:36)


Acetaminophen Tablet/Caplet (Tylenol  T (11/12/20 13:15)


Acetaminophen Tablet/Caplet (Tylenol  T (11/12/20 13:15)


Insulin Determir (Per Unit) (Levemir (Pe (11/12/20 21:00)


Albuterol/Ipra Inhalation Soln (Duoneb I (11/12/20 21:00)


Edu Tobacco/Smoking Cessation .prn (11/12/20 15:24)


Patient Visit (11/12/20 )


Pt Eval Moderate Complexity (11/12/20 )


Exercise Therap, Ea 15 Min (11/12/20 )


Functional Activities, Ea 15 (11/12/20 )


Patient Visit (11/12/20 )


Speech Sound Lang Comp (11/12/20 )


Treat. Speech/Lang/Voice (11/12/20 )


Cho 60g/M 3snack ( Willy) (11/12/20 Dinner)


Amlodipine Tablet (Norvasc Tablet) (11/12/20 18:00)


Amlodipine Tablet (Norvasc Tablet) (11/13/20 09:00)


Consult Cardiology (11/12/20 21:03)


Famotidine Tablet (Pepcid Tablet) (11/12/20 21:00)


Consult Urology (11/13/20 06:06)


Tamsulosin Capsule (Flomax Capsule) (11/13/20 18:00)


Bethanechol Tablet (Urecholine Tablet) (11/13/20 11:00)


Patient Visit (11/13/20 )


Functional Activities, Ea 15 (11/13/20 )





Rehab Nursing Orders:  Ongoing Assess. of Cognitive Status, Ongoing Assess. of 

Function Status, Bladder Management, Bladder Scan, Bladder Training, Bowel 

Management, Bowel Training, Disease Management & Educaiton, DVT Prophylaxis, 

Fall Prevention, Fluid/Electrolyte/Nutrition Mgmt, Infection Prevention, 

Medication Management & Education, Management of Risks & Complications, Manag

ement of Skin Intergrity, Nutrition Management, Pain Management, Patient/Family 

Support, Safety Management, Swallow Precautions





Intensity of Therapy to be met


Patient to be seen:  Min.3h per day/5 of 7d





PT IPOC


Problem List:  Activity Tolerance, Functional Strength, Safety, Balance, Gait, 

Transfer, Bed Mobility, ROM


Treatment Plan:  Continue Plan of Care


Bed Mobility, Education, Functional Activity Nona, Functional Strength, Group 

Therapy, Gait, Safety, Therapeutic Exercise, Transfers


Treatment Duration:  Nov 13, 2020


Frequency:  At least 5 of 7 days/Wk (IRF)


Estimated Hrs Per Day:  1.5 hours per day





OT IPOC


Problems:  Decreased Activ Tolerance, Decreased UE Strength, Dependent 

Transfers, Edema, Impaired Bed Mobility, Impaired Cognition, Impaired 

Coordination, Impaired Funct Balance, Impaired I ADL's, Impaired Self-Care 

Skills, Restricted Funct UE ROM, Visual-Perceptual Deficit


OT Treatment, Training and Edu:  Yes


Plan of Care:  ADL Retraining, Functional Mobility, Group Exercise/Act as Ind, 

UE Funct Exercise/Act


Treatment Duration:  Dec 10, 2020


Frequency:  At least 5 of 7 days/Wk (IRF)


Estimated Hrs Per Day:  1.5 hours per day





ST IPOC


Speech Therapy Treatment Plan:  Continue Plan of Care


Treatment Duration:  Nov 25, 2020


Frequency:  4 times per week (Patient will receive ST 4-5x per week)


Estimated Hrs Per Day:  .5 hour per day





/Case Mgmt


/Case Managemen:  Discharge Planning





Dietitian/Nutritionist


Dietitian/Nutritionist to monitor nutritional status and make changes and/or 

recommendations as needed and work with speech pathology on dietary upgrades as 

the occur.





Physician IPOC


Medical Issues being managed closely and that require the 24 hour availability 

of a physician:





Recent catastrophic CVA with dysphagia, expressive aphasia, right sided dominant

weakness will be at high risk for aspiration and other decompensation while 

recovering


Medical Issues:  Bowel/Bladder Function, DVT Prophylaxis, Falls Precautions, 

Fluid/Electrolyte/Nutrition Balance, Infection Protection, Pain Management, 

Swallowing Precautions


Brief Synthesis of Preadmission Screen, Post-Admission Evaluation, and Therapy 

Evaluations:





PT OT ST will all focus on regaining more function of right side flaccidity and 

increase the use of AD in order to lessen the burden on caretakers in the future


Medical Prognosis:  Fair


Anticipated Length of Stay:  14 days











ANDREY BAH DO                Nov 13, 2020 06:11

## 2020-11-13 NOTE — CONSULTATION REPORT
DATE OF SERVICE:  11/13/2020



ATTENDING PHYSICIAN:

Dr. Petit.



SUMMARY:

A 75-year-old white lady who about three weeks ago underwent a heart bypass

followed by a stroke affecting her left brain with right-sided weakness and

urinary retention.  I reviewed her history and physical by Dr. Petit.  The

patient denies any voiding symptoms prior to this episode, urinary incontinence

or retention.



IMPRESSION:

Urinary retention, neurogenic bladder secondary to CVA.



PLAN:

Started on Flomax 0.4 mg daily and Urecholine 10 mg q.i.d. before meals and at

bedtime.  Watch for side effects, mostly wheezes and shortness of breath.  In

three days, we will give her a trial of voiding and manage accordingly.





Job ID: [id number]

DocumentID: [id number]

Dictated Date:  11/13/2020 09:36:16

Transcription Date: 11/13/2020 10:39:46

Dictated By: ELIAS TAWIL, MD

## 2020-11-13 NOTE — NUR
RD ASSESSMENT 



PMHx: CAD; DM: COPD; dysphagia



PT INTERACTION: Pt was semi-awake and pleasant during dietary consult for MST score. Pt 
states current appetite is fair. Note avg PO intake 62% x2meal, per chart review. Note pt 
has hx of dysphagia and has a PEG tube, per chart review. Pt states following a regular diet 
at home, and has no issues with chewing/swallowing food. Pt states no recent issues with 
nausea, vomiting, constipation, or diarrhea, and that she is unsure of her last BM. Note pt 
currently on bowel regimen of colace BID, senna BID, and miralax BID, per chart review. Pt 
states no recent wt changes. Note unable to determine recent hx, per chart review. Pt states 
unsure of current DM management. Note unable to determine recent HbA1c, per chart review. 
Upon visual assessment, pt appears to be adequately nourished with no visible signs of 
muscle/fat wasting and a BMI of 25.3 (Normal BMI for age). 



Given wt hx, visual assessment, and PO intake, pt does not meet criteria for malnutrition 
per ASPEN guidelines. 



ABNORMAL NUTRITION-RELATED LAB VALUES

LOW:  cr 0.58; 

HIGH: glu 116



Est. kcal needs: 6721-4208 kcal | 25-30 kcal/kg  

Est. Pro needs:  57-68 g Pro | 1.0-1.2 g Pro/kg 



PES STATEMENT: Inadequate oral intake (NI-2.1) related to loss of appetite as evidenced by 
pt interview and avg PO intake 62% x2meal. 



INTERVENTION:  

Continue with current diet order of CHO 60g/m 3snack diet, with modifier of DYS Mechanically 
Altered. 

Pt may benefit from nutrition supplementation if PO intake declines. 

Did not offer diet education at this time, as pt fell asleep at end of assessment. Will 
attempt to offer when pt is more awake. 

Will continue to follow and reassess as pt needs, intake, and status change. 





DOREEN Carney, MS RD LD

## 2020-11-13 NOTE — NUR
Smoking Cessation order rec'd.  After review of pt chart, it's noted patient is an insulin 
dependent diabetic who has suffered a left sided CVA and currently has expressive aphasia.  
Rapport established.  Short discussion with patient about her smoking status, she mentions 
she cannot remember if she smokes or not.  When I asked her about having diabetes, she 
recalls that she does, but does not recall how long she has dealt with it.  Diabetic 
Education basics, Smoking Cessation basics, and the link between heart disease and diabetes 
pamphlets left at bedside table with patient.  Explained to patient I will visit with her 
again next week, and she should try to review the pamphlets over the weekend. Patient voiced 
understanding.  Comfort measures provided, call light explained, placed within reach for 
patient use.

## 2020-11-13 NOTE — PHYSICAL THERAPY DAILY NOTE
PT Daily Note-Current


Subjective


Pt presents supine in bed. Pt agrees to PT. Pt reports no pain.





Appearance


At conclusion of PT treatment patient is left in recliner with tray, call 

button, all needs met and chair alarm on.





Mental Status


Patient Orientation:  Person, Non-Verbal/Aphasic, Eyes Open, Situation


Attachments:  Roldan Catheter





Transfers


SCALE: Activities may be completed with or without assistive devices.





6-Indepedent-patient completes the activity by him/herself with no assistance f

rom a helper.


5-Set-up or Clean-up Assistance-helper sets up or cleans up; patient completes 

activity. [place] assists only prior to or  


    following the activity.


4-Supervision or Touching Assistance-helper provides verbal cues and/or 

touching/steadying and/or contact guard assistance as patient completes 

activity. Assistance may be provided   


    throughout the activity or intermittently.


3-Partial/Moderate Assistance-helper does LESS THAN HALF the effort. [place] 

lifts, holds or supports trunk or limbs, but provides less than half the effort.


2-Substantial/Maximal Assistance-helper does MORE THAN HALF the effort. [place] 

lifts or holds trunk or limbs and provides more than half the effort.


9-Pzrieaucu-vrccau does ALL the effort. Patient does none of the effort to 

complete the activity. Or, the assistance of 2 or more helpers is required for 

the patient to complete the  


    activity.


If activity was not attempted, code reason:


7-Patient Refused.


9-Not Applicable-not attempted and the patient did not perform the activity 

before the current illness, exacerbation or injury.


10-Not Attempted due to Environmental Limitations-(lack of equipment, weather 

restraints, etc.).


88-Not Attempted due to Medical Conditions or Safety Concerns.


Lying to Sitting/Side of Bed(Q:  3


Sit to Stand (QC):  3


Chair/Bed-to-Chair Xfer(QC):  3


Min assist with sit to stand. Mod assist with lying-> EOB and chair->chair 

transfers.





Weight Bearing


Right Lower Extremity:  Right


Full Weight Bearing


Left Lower Extremity:  Left


Full Weight Bearing





Wheelchair Training


Does the Pt Use a Wheelchair?:  Yes


Wheel 50 ft with 2 turns (QC):  2


Wheel 150 ft (QC):  2


Pt is able to use LUE to propel chair when cued; pt then given max assist for 

supplemental propelling and steering.





Exercises


Seated Therapy Exercises:  Ankle pumps (RLE)


Standing balance





Treatments


WC mobility, Transfer training, standing balance





Assessment


Current Status:  Poor Progress


Unable to fully assess the patient's needs due to their limitations with 

communication. Pt is able to bear weight and stand with CGA, but struggles with 

transfers. Pt appears restless and reports she is anxious (nurse gives her some 

meds for this but she seems to be anxious all the time). Treatment is difficult 

due to patient's anxiety which could also be fatigue or motivational issues, it 

is hard to say because the patient has trouble communicating.  She also seems to

have impaired coordination or proprioception, or possibly depth perception, 

testing cant really be done due to patient's communication issues and poor 

ability to follow directions.





Co-treated with OT due to pt's limitations in strength, mobility, and 

coordination of UEs and LEs. PT focused on standing balance and mobility while 

OT focused on UEs and ADLs.





PT Short Term Goals


Short Term Goals


Time Frame:  2020


Roll Left & Right:  4


Sit to lyin


Lying to sitting on side of be:  3


Chair/bed-to-chair transfer:  3


Walk 10 feet:  3


Does pt use a wc or scooter:  Yes


Wheel 50ft w/2 turns:  3


Wheel 150 feet:  3





PT Long Term Goals


Long Term Goals


PT Long Term Goals Time Frame:  Dec 3, 2020


Roll Left & Right (QC):  6


Sit to Lying (QC):  6


Lying-Sitting on Side/Bed(QC):  6


Sit to Stand (QC):  4


Chair/Bed-to-Chair Xfer(QC):  4


Toilet Transfer (QC):  4


Car Transfer (QC):  4


Does the Patient Walk:  Yes


Walk 10 feet (QC):  3


Walk 50ft with 2 Turns (QC):  3


Walk 150 ft (QC):  88


Walking 10ft on Uneven Surface:  3


1 Step (curb) (QC):  3


4 Steps (QC):  88


12 Steps (QC):  88


Picking up an Object (QC):  88


Does the Pt use WC or Scooter?:  Yes


Wheel 50 feet with 2 turns (QC:  6


Wheel 150 feet:  6





PT Plan


Problem List


Problem List:  Activity Tolerance, Functional Strength, Safety, Balance, Gait, 

Transfer, Bed Mobility, ROM





Treatment/Plan


Treatment Plan:  Continue Plan of Care


Treatment Plan:  Bed Mobility, Education, Functional Activity Nona, Functional 

Strength, Group Therapy, Gait, Safety, Therapeutic Exercise, Transfers


Treatment Duration:  Dec 4, 2020


Frequency:  At least 5 of 7 days/Wk (IRF)


Estimated Hrs Per Day:  1.5 hours per day


Patient and/or Family Agrees t:  Yes





Safety Risks/Education


Patient Education:  Transfer Techniques, Correct Positioning, Safety Issues


Teaching Recipient:  Patient


Teaching Methods:  Demonstration, Discussion


Response to Teaching:  Reinforcement Needed





Time/GCodes


Time In:  1000


Time Out:  1100


Total Billed Treatment Time:  90


Total Billed Treatment


1 visit


FA 90'





co-treated for 75' from [id number]











MELODY RAO PT                2020 11:26

## 2020-11-13 NOTE — PM&R PROGRESS NOTE
Subjective


HPI/CC On Admission


Date Seen by Provider:  Nov 13, 2020


Time Seen by Provider:  06:00


Subjective/Events-last exam


Patient doing well


Settling in well


No pain reported


Working with PT OT and ST


Eating a bit more without aspiration


No falls


Checked meds and labs


Conferred with RN


Reviewed therapy notes





Review of Systems


General:  Fatigue, Malaise


Neurological:  Weakness, Incoordination





Objective


Exam


Vital Signs





Vital Signs








  Date Time  Temp Pulse Resp B/P (MAP) Pulse Ox O2 Delivery O2 Flow Rate FiO2


 


11/14/20 09:00      Room Air  


 


11/14/20 06:00 36.3 88 16 162/61 (00) 94  1.50 





Capillary Refill : Less Than 3 Seconds


General Appearance:  No Apparent Distress, Anxious, Chronically ill, Thin


HEENT:  PERRL/EOMI, Normal ENT Inspection, Pharynx Normal


Neck:  Full Range of Motion, Normal Inspection, Non Tender, Supple, Carotid 

Bruit


Respiratory:  Chest Non Tender, Lungs Clear, No Accessory Muscle Use, No 

Respiratory Distress, Decreased Breath Sounds


Cardiovascular:  Regular Rate, Rhythm, No Edema, No Gallop, No JVD, No Murmur, 

Normal Peripheral Pulses


Gastrointestinal:  Normal Bowel Sounds, No Organomegaly, No Pulsatile Mass, Non 

Tender, Soft


Back:  Normal Inspection, No CVA Tenderness, No Vertebral Tenderness


Extremity:  Normal Capillary Refill, Normal Inspection, Normal Range of Motion 

(except right sided weakness), Non Tender, No Calf Tenderness, No Pedal Edema


Neurologic/Psychiatric:  Alert, No Motor/Sensory Deficits, Normal Mood/Affect, 

Abnormal CNs II-XII, Aphasia, Facial Droop, Motor Weakness (right sided 

flaccidity)


Skin:  Normal Color, Warm/Dry


Lymphatic:  No Adenopathy





Results/Procedures


Lab


Patient resulted labs reviewed.





FIM


Transfers


Therapy Code Descriptions/Definitions 





Functional [place] Measure:


0=Not Assessed/NA        4=Minimal Assistance


1=Total Assistance        5=Supervision or Setup


2=Maximal Assistance  6=Modified [place]


3=Moderate Assistance 7=Complete IndependenceSCALE: Activities may be completed 

with or without assistive devices.





6-Indepedent-patient completes the activity by him/herself with no assistance 

from a helper.


5-Set-up or Clean-up Assistance-helper sets up or cleans up; patient completes 

activity. [place] assists only prior to or  


    following the activity.


4-Supervision or Touching Assistance-helper provides verbal cues and/or 

touching/steadying and/or contact guard assistance as patient completes 

activity. Assistance may be provided   


    throughout the activity or intermittently.


3-Partial/Moderate Assistance-helper does LESS THAN HALF the effort. [place] 

lifts, holds or supports trunk or limbs, but provides less than half the effort.


2-Substantial/Maximal Assistance-helper does MORE THAN HALF the effort. [place] 

lifts or holds trunk or limbs and provides more than half the effort.


6-Avntgchpj-hmhiec does ALL the effort. Patient does none of the effort to 

complete the activity. Or, the assistance of 2 or more helpers is required for 

the patient to complete the  


    activity.


If activity was not attempted, code reason:


7-Patient Refused.


9-Not Applicable-not attempted and the patient did not perform the activity 

before the current illness, exacerbation or injury.


10-Not Attempted due to Environmental Limitations-(lack of equipment, weather 

restraints, etc.).


88-Not Attempted due to Medical Conditions or Safety Concerns.


Roll Left to Right (QC):  3


Sit to Lying (QC):  3


Sit to Stand (QC):  3


Chair/Bed-to-Chair Xfer(QC):  3


Car Transfer (QC):  3





Gait Training


Does the Patient Walk?:  Yes


Walk 10 feet (QC):  88


Walk 50 ft with 2 Turns(QC):  88


Walk 150 ft (QC):  88


Walking 10ft/uneven surface-QC:  88


Gait Assistive Device:  Parallel Bars





Wheelchair Training


Does the Pt Use a Wheelchair?:  Yes


Distance:  150'x3


Wheel 50 ft with 2 turns (QC):  1


Wheel 150 ft (QC):  1


Type of Wheelchair:  Manual





Stair Training


1 Step (curb) (QC):  88


4 Steps (QC):  88


12 Steps (QC):  88





Balance


Picking up an Object (QC):  88





ADL-Treatment


Eating (QC):  7 (Pt. to be evaluated by ST.)


Oral Hygiene (QC):  1 (OT handed pt. toothbrush while she was in shower, with no

toothpaste.  Pt. put the handle of it in her mouth.  OT attempted Ruby assist, 

but pt. unable to understand.  OT brushes pt's teeth for her.)


Shower/Bathe Self (QC):  2 (Pt. is able to wash her chest and right arm.  She 

requires assistance to wash all other parts.  Pt. showers while seated on shower

chair.)


Upper Body Dressing (QC):  88 (Pt. verbalizes that she is tired.  OT assists 

with doffing/donning clean hospital gown.)


Lower Body Dressing (QC):  88


On/Off Footwear (QC):  1 (Pt. able to extend her legs for OT to doff/don slipper

socks.)


Toileting Hygiene (QC):  1 (Catheter.  Pt. verbalizes that she is continent of 

stool, but pt. slightly incontinent in shower.)





Assessment/Plan


Assessment and Plan


Assess & Plan/Chief Complaint


Assessment per Luke Bainbridge, MSIII:


Critical illness myopathy


S/P CVA involving left occipital artery


S/P 3 vessel CABG


Debility


Expressive aphasia


Dysarthria


Right sided weakness


Diabetes mellitus


COPD


CAD


Dysphagia requiring PEG


Roldan cath in place





Plan:


1. Ensure adequate anticoagulation


2. PT, OT, and SLP eval's and treat


3. Oxygen via NC- wean as tolerated


4. Resume Home medications


5. Fall precautions


6. Dietary consult


7. DVT prophylaxis





11/13/20:


Patient participating well


No pain reported


Continue current meds


Advance diet





(1) CVA (cerebral vascular accident)


(2) Dysarthria


(3) Dysphagia


(4) PEG (percutaneous endoscopic gastrostomy) status


(5) Expressive aphasia


(6) Smoker


(7) COPD (chronic obstructive pulmonary disease)


(8) CAD (coronary artery disease)


(9) Hx of CABG


(10) Roldan catheter in place


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











ANDREY BAH DO                Nov 13, 2020 06:11

## 2020-11-13 NOTE — CONSULTATION-CARDIOLOGY
HPI-Cardiology


Cardiology Consultation


Date of Consultation


20


Date of Admission





Time Seen by Provider:  14:36


Indication:  coronary artery disease





HPI


75-year-old lady with history of CABG underwent surgery on 2020 

postoperatively 8 was reported that patient had a stroke with right hemiparesis,

was transferred [facility] the initial hospital [facility] post stroke 

then she was transferred to our institution.  Patient is a poor historian unable

to provide history, doesn't recall most of the events, the reports regarding her

bypass surgery and the initial outcome is not available it was reported that she

had absent pulse in her leg and treated conservatively but currently she has a 

palpable pulse





Home Medications & Allergies


Allergies:  


Coded Allergies:  


     No Known Drug Allergies (Unverified , 20)


Home Medication List Reviewed:  Yes





PMH-Social-Family Hx


Patient Social History


Marital Status:  single


Employed/Student:  student, part-time


Alcohol Use:  Denies Use


Recreational Drug Use:  No


Smoking Status:  Current Everyday Smoker


Type Used:  Cigarettes


Recent Foreign Travel:  No


Recent Infectious Disease Expo:  No


Recent Hopitalizations:  Yes (CVA AND BYPASS SURGERY)


Physical Abuse Screen:  No


Sexual Abuse:  No





Immunizations Up To Date


Date of Influenza Vaccine:  Nov 10, 2020





Past Medical History


Discussed below





Family Medical History


Family Medical Hx


Noncontributory


Family History:  


Patient reports no known family medical history.





Review of Systems-General


Review of Systems


Constitutional:  see HPI, malaise, weakness, other (unable to provide review of 

systems)


Respiratory:  see HPI; No cough, No dyspnea on exertion, No hemoptysis, No o

rthopnea, No phlegm, No short of breath, No stridor, No wheezing, No other


Cardiovascular:  see HPI; No chest pain, No edema, No Hx of Intervention, No 

palpitations, No syncope, No vascular heart diseas, No other


Gastrointestinal:  constipation


Genitourinary:  other (retention)


Psychiatric/Neurological:  Anxiety, Depressed, Numbness, Paresthesia





All Other Systems Reviewed


Negative Unless Noted:  Yes





Reviewed Test Results


Reviewed Test Results


Lab





Laboratory Tests








Test


 20


16:54 20


20:44 20


05:25 20


06:22 Range/Units


 


 


Glucometer 73  185 H 50 *L    MG/DL


 


White Blood Count


 


 


 


 5.8 


 4.3-11.0


10^3/uL


 


Red Blood Count


 


 


 


 4.25 


 3.80-5.11


10^6/uL


 


Hemoglobin    12.2  11.5-16.0  g/dL


 


Hematocrit    40  35-52  %


 


Mean Corpuscular Volume    94  80-99  fL


 


Mean Corpuscular Hemoglobin    29  25-34  pg


 


Mean Corpuscular Hemoglobin


Concent 


 


 


 30 L


 32-36  g/dL





 


Red Cell Distribution Width    15.6 H 10.0-14.5  %


 


Platelet Count


 


 


 


 346 


 130-400


10^3/uL


 


Mean Platelet Volume    9.6  9.0-12.2  fL


 


Immature Granulocyte % (Auto)    0   %


 


Neutrophils (%) (Auto)    61  42-75  %


 


Lymphocytes (%) (Auto)    27  12-44  %


 


Monocytes (%) (Auto)    10  0-12  %


 


Eosinophils (%) (Auto)    2  0-10  %


 


Basophils (%) (Auto)    1  0-10  %


 


Neutrophils # (Auto)


 


 


 


 3.5 


 1.8-7.8


10^3/uL


 


Lymphocytes # (Auto)


 


 


 


 1.6 


 1.0-4.0


10^3/uL


 


Monocytes # (Auto)


 


 


 


 0.6 


 0.0-1.0


10^3/uL


 


Eosinophils # (Auto)


 


 


 


 0.1 


 0.0-0.3


10^3/uL


 


Basophils # (Auto)


 


 


 


 0.0 


 0.0-0.1


10^3/uL


 


Immature Granulocyte # (Auto)


 


 


 


 0.0 


 0.0-0.1


10^3/uL


 


Sodium Level    139  135-145  MMOL/L


 


Potassium Level    4.2  3.6-5.0  MMOL/L


 


Chloride Level    102    MMOL/L


 


Carbon Dioxide Level    23  21-32  MMOL/L


 


Anion Gap    14  5-14  MMOL/L


 


Blood Urea Nitrogen    12  7-18  MG/DL


 


Creatinine


 


 


 


 0.58 L


 0.60-1.30


MG/DL


 


Estimat Glomerular Filtration


Rate 


 


 


 > 60 


  





 


BUN/Creatinine Ratio    21   


 


Glucose Level    105    MG/DL


 


Calcium Level    9.3  8.5-10.1  MG/DL


 


Corrected Calcium    9.5  8.5-10.1  MG/DL


 


Total Bilirubin    0.3  0.1-1.0  MG/DL


 


Aspartate Amino Transf


(AST/SGOT) 


 


 


 22 


 5-34  U/L





 


Alanine Aminotransferase


(ALT/SGPT) 


 


 


 31 


 0-55  U/L





 


Alkaline Phosphatase    106    U/L


 


Total Protein    7.0  6.4-8.2  GM/DL


 


Albumin    3.8  3.2-4.5  GM/DL


 


Test


 20


06:47 20


10:56 


 


 Range/Units


 


 


Glucometer 116 H 89      MG/DL











Physical Exam


Physical Exam


Vital Signs





Vital Signs - First Documented








 20





 15:00


 


Temp 35.9


 


Pulse 80


 


Resp 20


 


B/P (MAP) 140/81 (100)


 


Pulse Ox 99


 


O2 Delivery Nasal Cannula


 


O2 Flow Rate 1.50





Capillary Refill : Less Than 3 Seconds


Height, Weight, BMI


Height: '"


Weight: lbs. oz. kg; 25.27 BMI


Method:


General Appearance:  No Apparent Distress, Anxious, Chronically ill, Thin


Eyes:  Bilateral Eye Normal Inspection, Bilateral Eye PERRL


HEENT:  PERRL/EOMI, Normal ENT Inspection, Pharynx Normal


Neck:  Full Range of Motion, Normal Inspection, Non Tender, Supple, Carotid 

Bruit


Respiratory:  Chest Non Tender, Lungs Clear, No Accessory Muscle Use, No 

Respiratory Distress, Decreased Breath Sounds


Cardiovascular:  Regular Rate, Rhythm, No Edema, No Gallop, No JVD, No Murmur, 

Normal Peripheral Pulses


Gastrointestinal:  Normal Bowel Sounds, No Organomegaly, No Pulsatile Mass, Non 

Tender, Soft


Back:  Normal Inspection, No CVA Tenderness, No Vertebral Tenderness


Extremity:  Normal Capillary Refill, Normal Inspection, Normal Range of Motion 

(except right sided weakness), Non Tender, No Calf Tenderness, No Pedal Edema


Neurologic/Psychiatric:  Alert, Normal Mood/Affect, Abnormal CNs II-XII, 

Aphasia, Facial Droop, Motor Weakness (right sided flaccidity)


Skin:  Normal Color, Warm/Dry


Lymphatic:  No Adenopathy





A/P-Cardiology


Admission Diagnosis


CVA


Coronary artery disease


Hypertension


Hyperlipidemia





Assessment/Plan


Status post CVA with residual right hemiparesis, receiving PT OT, managed by Dr. Petit





Coronary artery disease status post CABG 3.  Continue to monitor at this time. 

Next





Hypertension, continue current medication monitor blood pressure





Hyperlipidemia, monitor lipids





Questionable peripheral arterial disease with ischemic event after her bypass 

has improved after conservative management, continue to monitor





Diabetes mellitus, followed and managed by primary care physician





Debility, receiving physical therapy





Clinical Quality Measures


DVT/VTE Risk/Contraindication:


Risk Factor Score Per Nursin


RFS Level Per Nursing on Admit:  4+=Very High











BROOKE SHEA MD              2020 14:41

## 2020-11-13 NOTE — OCCUPATIONAL THER DAILY NOTE
OT Current Status-Daily Note


Subjective


Pt alert, lying in bed.  Pt agrees to therapy.  Difficult to assess pain or 

orientation.





Mental Status/Objective


Patient Orientation:  Person, Place, Time, Situation


Attachments:  Roldan Catheter, IV





ADL-Treatment


1st TREATMENT-PT/OT cotreat ([id number]), skills of 2 clinicians required for 

skilled instruction and care due to pt's decreased mobility, impulsivity and 

decreased balance issues.  PT focusing on transfers, mobility and standing.  OT 

focusing on ADLs, R UE placement during mobility and standing.  Pt requires 

assist to don/doff socks.  Assist x1 for supine to EOB.  Pt requires assist to 

thread briefs over feet.  Pt incontinent of bowel, transfer to BSC and assist to

manipulate clothing.  Assist  x1 to stand assist x1 to cleanse self after 

toileting.  Pt educated on w/c mobility and using B LE and L UE to propel chair.

 Pt requires verbal and physical cues to continue to propel w/c with correct 

technique and to keep from running into objects.  After set up, pt able to use 

swab with toothpaste to brush teeth.  Assist to lean forward to spit and cleanse

mouth.  Pt able to stand at parallel bars with PT assisting with standing 

balance and OT assisting with R UE placement and wt bearing.  Pt demonstrates 

minimal movement in tricep and with .


2nd TREATMENT-Pt unable to manipulate eating utensils to feed self.  Pt is able 

to chose which item she wants to eat verbally then will lean forward and close 

mouth over spoon and pull off food with lips.  Pt is able to to bring cup to 

mouth with assist to position straw and close assist.  Pt demonstrated movement 

throughout R UE though fatigued quickly.  After session pt sitting in recliner 

with call light/phone in reach.  All needs met in room.


Therapy Code Descriptions/Definitions 





Functional [place] Measure:


0=Not Assessed/NA        4=Minimal Assistance


1=Total Assistance        5=Supervision or Setup


2=Maximal Assistance  6=Modified [place]


3=Moderate Assistance 7=Complete IndependenceSCALE: Activities may be completed 

with or without assistive devices.





6-Indepedent-patient completes the activity by him/herself with no assistance 

from a helper.


5-Set-up or Clean-up Assistance-helper sets up or cleans up; patient completes 

activity. [place] assists only prior to or  


    following the activity.


4-Supervision or Touching Assistance-helper provides verbal cues and/or 

touching/steadying and/or contact guard assistance as patient completes 

activity. Assistance may be provided   


    throughout the activity or intermittently.


3-Partial/Moderate Assistance-helper does LESS THAN HALF the effort. [place] 

lifts, holds or supports trunk or limbs, but provides less than half the effort.


2-Substantial/Maximal Assistance-helper does MORE THAN HALF the effort. [place] 

lifts or holds trunk or limbs and provides more than half the effort.


3-Ngrlybvkd-hcgwrx does ALL the effort. Patient does none of the effort to 

complete the activity. Or, the assistance of 2 or more helpers is required for 

the patient to complete the  


    activity.


If activity was not attempted, code reason:


7-Patient Refused.


9-Not Applicable-not attempted and the patient did not perform the activity 

before the current illness, exacerbation or injury.


10-Not Attempted due to Environmental Limitations-(lack of equipment, weather 

restraints, etc.).


88-Not Attempted due to Medical Conditions or Safety Concerns.


Eating (QC):  2


Oral Hygiene (QC):  3


Lower Body Dressing (QC):  1


On/Off Footwear:  2


Toileting Hygiene (QC):  1


Toilet Transfer (QC):  1





OT Short Term Goals


Short Term Goals


Time Frame:  2020


Eating:  3


Oral hygiene:  3


Toileting hygiene:  3


Shower/bathe self:  3


Upper body dressin


Lower body dressing:  3


Putting on/taking off footwear:  3





OT Long Term Goals


Long Term Goals


Time Frame:  Dec 10, 2020


Eating (QC):  4


Oral Hygiene (QC):  4


Toileting Hygiene (QC):  3


Shower/Bathe Self (QC):  3


Upper Body Dressing (QC):  5


Lower Body Dressing (QC):  4


On/Off Footwear (QC):  4


Additional Goals:  1-Demonstrate ADL Tasks, 2-Verbalize Understanding, 3-

ImproveStrength/Nona


1=Demonstrate adherence to instructed precautions during ADL tasks.


2=Patient will verbalize/demonstrate understanding of assistive 

devices/modifications for ADL.


3=Patient will improve strength/tolerance for activity to enable patient to 

perform ADL's.





OT Education/Plan


Problem List/Assessment


Assessment:  Decreased Activ Tolerance, Decreased Safety Aware, Decreased UE 

Strength, Impaired Bed Mobility, Impaired Cognition, Impaired Coordination, 

Impaired Funct Balance, Impaired I ADL's, Impaired Self-Care Skills, Restricted 

Funct UE ROM, Visual-Perceptual Deficit





Discharge Recommendations


Plan/Recommendations:  Continue POC





Treatment Plan/Plan of Care


Patient would benefit from OT for education, treatment and training to promote 

independence in ADL's, mobility, safety and/or upper extremity function for 

ADL's.


Plan of Care:  ADL Retraining, Functional Mobility, Group Exercise/Act as Ind, 

UE Funct Exercise/Act


Treatment Duration:  Dec 10, 2020


Frequency:  At least 5 of 7 days/Wk (IRF)


Estimated Hrs Per Day:  1.5 hours per day


Agreement:  Yes


Rehab Potential:  Fair





Time/GCodes


Start Time:  10:00 ([id number])


Stop Time:  13:30 ([id number])


Total Time Billed (hr/min):  90


Billed Treatment Time


2 visits-ADL 1 (20 min)  FA 5 (70 min)  co-treat with PT [id number], individual 

[id number]











BENTLEY CHACON               2020 11:14

## 2020-11-14 NOTE — PM&R PROGRESS NOTE
Subjective


HPI/CC On Admission


Date Seen by Provider:  Nov 14, 2020


Time Seen by Provider:  06:00


Subjective/Events-last exam


11/14/20:


Patient denies pain


Working well with structured therapies


Roldan cath still in place will need to DC soon


Working on BM regimen








Patient doing well


Settling in well


No pain reported


Working with PT OT and ST


Eating a bit more without aspiration


No falls


Checked meds and labs


Conferred with RN


Reviewed therapy notes





Review of Systems


General:  Fatigue, Malaise


Neurological:  Weakness, Numbness, Incoordination, Change in speech





Objective


Exam


Vital Signs





Vital Signs








  Date Time  Temp Pulse Resp B/P (MAP) Pulse Ox O2 Delivery O2 Flow Rate FiO2


 


11/14/20 09:00      Room Air  


 


11/14/20 06:00 36.3 88 16 162/61 (17) 94  1.50 





Capillary Refill : Less Than 3 Seconds


General Appearance:  No Apparent Distress, Anxious, Chronically ill, Thin


HEENT:  PERRL/EOMI, Normal ENT Inspection, Pharynx Normal


Neck:  Full Range of Motion, Normal Inspection, Non Tender, Supple, Carotid Br

uit


Respiratory:  Chest Non Tender, Lungs Clear, No Accessory Muscle Use, No 

Respiratory Distress, Decreased Breath Sounds


Cardiovascular:  Regular Rate, Rhythm, No Edema, No Gallop, No JVD, No Murmur, 

Normal Peripheral Pulses


Gastrointestinal:  Normal Bowel Sounds, No Organomegaly, No Pulsatile Mass, Non 

Tender, Soft


Back:  Normal Inspection, No CVA Tenderness, No Vertebral Tenderness


Extremity:  Normal Capillary Refill, Normal Inspection, Normal Range of Motion 

(except right sided weakness), Non Tender, No Calf Tenderness, No Pedal Edema


Neurologic/Psychiatric:  Alert, No Motor/Sensory Deficits, Normal Mood/Affect, 

Abnormal CNs II-XII, Aphasia, Facial Droop, Motor Weakness (right sided 

flaccidity)


Skin:  Normal Color, Warm/Dry


Lymphatic:  No Adenopathy





Results/Procedures


Lab


Patient resulted labs reviewed.





FIM


Transfers


Therapy Code Descriptions/Definitions 





Functional [place] Measure:


0=Not Assessed/NA        4=Minimal Assistance


1=Total Assistance        5=Supervision or Setup


2=Maximal Assistance  6=Modified [place]


3=Moderate Assistance 7=Complete IndependenceSCALE: Activities may be completed 

with or without assistive devices.





6-Indepedent-patient completes the activity by him/herself with no assistance 

from a helper.


5-Set-up or Clean-up Assistance-helper sets up or cleans up; patient completes 

activity. [place] assists only prior to or  


    following the activity.


4-Supervision or Touching Assistance-helper provides verbal cues and/or sheila

tommy/steadying and/or contact guard assistance as patient completes activity. 

Assistance may be provided   


    throughout the activity or intermittently.


3-Partial/Moderate Assistance-helper does LESS THAN HALF the effort. [place] 

lifts, holds or supports trunk or limbs, but provides less than half the effort.


2-Substantial/Maximal Assistance-helper does MORE THAN HALF the effort. [place] 

lifts or holds trunk or limbs and provides more than half the effort.


9-Pfgktidja-rzbkll does ALL the effort. Patient does none of the effort to 

complete the activity. Or, the assistance of 2 or more helpers is required for 

the patient to complete the  


    activity.


If activity was not attempted, code reason:


7-Patient Refused.


9-Not Applicable-not attempted and the patient did not perform the activity 

before the current illness, exacerbation or injury.


10-Not Attempted due to Environmental Limitations-(lack of equipment, weather 

restraints, etc.).


88-Not Attempted due to Medical Conditions or Safety Concerns.


Roll Left to Right (QC):  3


Sit to Lying (QC):  3


Sit to Stand (QC):  3


Chair/Bed-to-Chair Xfer(QC):  3


Car Transfer (QC):  3





Gait Training


Does the Patient Walk?:  Yes


Walk 10 feet (QC):  88


Walk 50 ft with 2 Turns(QC):  88


Walk 150 ft (QC):  88


Walking 10ft/uneven surface-QC:  88


Gait Assistive Device:  Parallel Bars





Wheelchair Training


Does the Pt Use a Wheelchair?:  Yes


Distance:  150'x3


Wheel 50 ft with 2 turns (QC):  2


Wheel 150 ft (QC):  2


Type of Wheelchair:  Manual





Stair Training


1 Step (curb) (QC):  88


4 Steps (QC):  88


12 Steps (QC):  88





Balance


Picking up an Object (QC):  88





ADL-Treatment


Eating (QC):  2


Oral Hygiene (QC):  3


Shower/Bathe Self (QC):  2 (Pt. is able to wash her chest and right arm.  She 

requires assistance to wash all other parts.  Pt. showers while seated on shower

chair.)


Upper Body Dressing (QC):  88 (Pt. verbalizes that she is tired.  OT assists 

with doffing/donning clean hospital gown.)


Lower Body Dressing (QC):  1


On/Off Footwear (QC):  2


Toileting Hygiene (QC):  1


Toilet Transfer (QC):  1





Assessment/Plan


Assessment and Plan


Assess & Plan/Chief Complaint


Assessment per Luke Bainbridge, MSIII:


Critical illness myopathy


S/P CVA involving left occipital artery


S/P 3 vessel CABG


Debility


Expressive aphasia


Dysarthria


Right sided weakness


Diabetes mellitus


COPD


CAD


Dysphagia requiring PEG


Roldan cath in place





Plan:


1. Ensure adequate anticoagulation


2. PT, OT, and SLP eval's and treat


3. Oxygen via NC- wean as tolerated


4. Resume Home medications


5. Fall precautions


6. Dietary consult


7. DVT prophylaxis





11/13/20:


Patient participating well


No pain reported


Continue current meds


Advance diet





11/14/20:


Roldan DC soon


Continue home meds


Therapies to continue protocol





(1) CVA (cerebral vascular accident)


(2) Dysarthria


(3) Dysphagia


(4) PEG (percutaneous endoscopic gastrostomy) status


(5) Expressive aphasia


(6) Smoker


(7) COPD (chronic obstructive pulmonary disease)


(8) CAD (coronary artery disease)


(9) Hx of CABG


(10) Roldan catheter in place


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











ANDREY BAH DO                Nov 14, 2020 16:34

## 2020-11-14 NOTE — PHYSICAL THERAPY DAILY NOTE
PT Daily Note-Current


Subjective


Pt. in bed, smiles, some speaking, agreeable to Ex and up in chair





Pain





   Location:  No Pain Reported





Transfers


SCALE: Activities may be completed with or without assistive devices.





6-Indepedent-patient completes the activity by him/herself with no assistance 

from a helper.


5-Set-up or Clean-up Assistance-helper sets up or cleans up; patient completes 

activity. [place] assists only prior to or  


    following the activity.


4-Supervision or Touching Assistance-helper provides verbal cues and/or 

touching/steadying and/or contact guard assistance as patient completes 

activity. Assistance may be provided   


    throughout the activity or intermittently.


3-Partial/Moderate Assistance-helper does LESS THAN HALF the effort. [place] 

lifts, holds or supports trunk or limbs, but provides less than half the effort.


2-Substantial/Maximal Assistance-helper does MORE THAN HALF the effort. [place] 

lifts or holds trunk or limbs and provides more than half the effort.


9-Rzaqxytxt-wcdmzc does ALL the effort. Patient does none of the effort to 

complete the activity. Or, the assistance of 2 or more helpers is required for 

the patient to complete the  


    activity.


If activity was not attempted, code reason:


7-Patient Refused.


9-Not Applicable-not attempted and the patient did not perform the activity 

before the current illness, exacerbation or injury.


10-Not Attempted due to Environmental Limitations-(lack of equipment, weather 

restraints, etc.).


88-Not Attempted due to Medical Conditions or Safety Concerns.


mod assist sup to sit, sitting balance CGA to min, Sit to stand mod assist, SPT 

bed to recliner mod assist





Weight Bearing


Right Lower Extremity:  Right


Full Weight Bearing


Left Lower Extremity:  Left


Full Weight Bearing





Exercises


Supine Ex:  Ankle pumps, Quad Set, Heel Slides, Hip abd/add


Supine Reps:  12


Seated Therapy Exercises:  Ankle pumps, Sit to stand, Long arc quads, Hip 

abd/add


Seated Reps:  8





Treatments


sit to stand , standing and sitting balance, TRFs





Assessment


nurse present after in chair, pt. with dyspnea, O2 sats>90%, nurse present to 

assess





PT Short Term Goals


Short Term Goals


Time Frame:  2020


Roll Left & Right:  4


Sit to lyin


Lying to sitting on side of be:  3


Chair/bed-to-chair transfer:  3


Walk 10 feet:  3


Does pt use a wc or scooter:  Yes


Wheel 50ft w/2 turns:  3


Wheel 150 feet:  3





PT Long Term Goals


Long Term Goals


PT Long Term Goals Time Frame:  Dec 3, 2020


Roll Left & Right (QC):  6


Sit to Lying (QC):  6


Lying-Sitting on Side/Bed(QC):  6


Sit to Stand (QC):  4


Chair/Bed-to-Chair Xfer(QC):  4


Toilet Transfer (QC):  4


Car Transfer (QC):  4


Does the Patient Walk:  Yes


Walk 10 feet (QC):  3


Walk 50ft with 2 Turns (QC):  3


Walk 150 ft (QC):  88


Walking 10ft on Uneven Surface:  3


1 Step (curb) (QC):  3


4 Steps (QC):  88


12 Steps (QC):  88


Picking up an Object (QC):  88


Does the Pt use WC or Scooter?:  Yes


Wheel 50 feet with 2 turns (QC:  6


Wheel 150 feet:  6





PT Plan


Treatment/Plan


Treatment Plan:  Continue Plan of Care


Treatment Plan:  Bed Mobility, Education, Functional Activity Nona, Functional 

Strength, Group Therapy, Gait, Safety, Therapeutic Exercise, Transfers


Treatment Duration:  2020


Frequency:  At least 5 of 7 days/Wk (IRF)


Estimated Hrs Per Day:  1.5 hours per day


Patient and/or Family Agrees t:  Yes





Safety Risks/Education


Patient Education:  Transfer Techniques, Correct Positioning


Teaching Recipient:  Patient





Time/GCodes


Time In:  940


Time Out:  1005


Total Billed Treatment Time:  25


Total Billed Treatment


1,FA15,EX10











ARRON WHATLEY PTA             2020 12:42

## 2020-11-15 NOTE — CARDIOLOGY PROGRESS NOTE
Subjective


Date Seen by Provider:  Nov 15, 2020


Time Seen by Provider:  10:41


Subjective/Events-last exam


Patient was seen at bedside laying down comfortably, no new complaint


Review of Systems


General:  No Chills, No Night Sweats, No Fatigue, No Malaise, No Appetite, No 

Other


HEENT:  No Head Aches, No Visual Changes, No Eye Pain, No Ear Pain, No 

Dysphasia, No Sinus Congestion, No Post Nasal Drip, No Sore Throat, No Other


Pulmonary:  No Dyspnea, No Cough, No Pleuritic Chest Pain, No Other


Cardiovascular:  No: Chest Pain, Palpitations, Orthopnea, Paroxysmal Noc. 

Dyspnea, Edema, Lt Headedness, Other





Objective-Cardiology


Exam


Last Set of Vital Signs





Vital Signs








 11/14/20 11/15/20 11/15/20





 07:57 06:00 07:40


 


Temp  36.6 


 


Pulse  86 


 


Resp  19 


 


B/P (MAP)  131/61 (84) 


 


Pulse Ox   94


 


O2 Delivery   Room Air


 


O2 Flow Rate 2.00  





Capillary Refill : Less Than 3 Seconds


I&O











Intake and Output 


 


 11/15/20





 00:00


 


Intake Total 1030 ml


 


Output Total 1350 ml


 


Balance -320 ml


 


 


 


Intake Oral 1030 ml


 


Output Urine Total 1350 ml








General:  Alert, Oriented X3, Cooperative


HEENT:  Atraumatic, PERRLA


Neck:  Supple, No JVD, No Thyromegaly


Lungs:  Clear to Auscultation, Normal Air Movement


Heart:  Regular Rate, Normal S1, Normal S2, No Murmurs


Abdomen:  Normal Bowel Sounds, Soft, No Tenderness, No Hepatosplenomegaly, No 

Masses


Extremities:  No Clubbing, No Cyanosis, No Edema, Normal Pulses, No 

Tenderness/Swelling


Skin:  No Rashes, No Breakdown, No Significant Lesion


Neuro:  Normal Gait, Normal Speech, Strength at 5/5 X4 Ext, Normal Tone, 

Sensation Intact


Psych/Mental Status:  Mental Status NL, Mood NL





A/P-Cardiology


Admission Diagnosis


CVA


Coronary artery disease


Hypertension


Hyperlipidemia





Assessment/Plan


Status post CVA with residual right hemiparesis, receiving PT OT, I am adding 

Plavix if okay with Dr. Petit





Coronary artery disease status post CABG 3.  Continue to monitor at this time.





Hypertension, continue current medication monitor blood pressure





Hyperlipidemia, monitor lipids





Questionable peripheral arterial disease with ischemic event after her bypass 

has improved after conservative management, I am adding Plavix to her current 

medication





Diabetes mellitus, followed and managed by primary care physician





Debility, receiving physical therapy





Clinical Quality Measures


DVT/VTE Risk/Contraindication:


Risk Factor Score Per Nursin


RFS Level Per Nursing on Admit:  4+=Very High











BROOKE SHEA MD              Nov 15, 2020 10:42

## 2020-11-15 NOTE — PM&R PROGRESS NOTE
Subjective


HPI/CC On Admission


Date Seen by Provider:  Nov 15, 2020


Time Seen by Provider:  06:00


Subjective/Events-last exam


11/15/20:


Patient participating with therapy


No pain reported


Dr Tawil consulted for dumont catheter








11/14/20:


Patient denies pain


Working well with structured therapies


Dumont cath still in place will need to DC soon


Working on BM regimen








Patient doing well


Settling in well


No pain reported


Working with PT OT and ST


Eating a bit more without aspiration


No falls


Checked meds and labs


Conferred with RN


Reviewed therapy notes





Review of Systems


General:  Fatigue, Malaise


Neurological:  Weakness, Incoordination





Objective


Exam


Vital Signs





Vital Signs








  Date Time  Temp Pulse Resp B/P (MAP) Pulse Ox O2 Delivery O2 Flow Rate FiO2


 


11/15/20 20:30     93 Room Air  


 


11/15/20 17:16 36.0 87 16 162/69 (100)    


 


11/14/20 07:57       2.00 





Capillary Refill : Less Than 3 Seconds


General Appearance:  No Apparent Distress, Anxious, Chronically ill, Thin


HEENT:  PERRL/EOMI, Normal ENT Inspection, Pharynx Normal


Neck:  Full Range of Motion, Normal Inspection, Non Tender, Supple, Carotid 

Bruit


Respiratory:  Chest Non Tender, Lungs Clear, No Accessory Muscle Use, No 

Respiratory Distress, Decreased Breath Sounds


Cardiovascular:  Regular Rate, Rhythm, No Edema, No Gallop, No JVD, No Murmur, 

Normal Peripheral Pulses


Gastrointestinal:  Normal Bowel Sounds, No Organomegaly, No Pulsatile Mass, Non 

Tender, Soft


Back:  Normal Inspection, No CVA Tenderness, No Vertebral Tenderness


Extremity:  Normal Capillary Refill, Normal Inspection, Normal Range of Motion 

(except right sided weakness), Non Tender, No Calf Tenderness, No Pedal Edema


Neurologic/Psychiatric:  Alert, No Motor/Sensory Deficits, Normal Mood/Affect, 

Abnormal CNs II-XII, Aphasia, Facial Droop, Motor Weakness (right sided 

flaccidity)


Skin:  Normal Color, Warm/Dry


Lymphatic:  No Adenopathy





Results/Procedures


Lab


Patient resulted labs reviewed.





FIM


Transfers


Therapy Code Descriptions/Definitions 





Functional [place] Measure:


0=Not Assessed/NA        4=Minimal Assistance


1=Total Assistance        5=Supervision or Setup


2=Maximal Assistance  6=Modified [place]


3=Moderate Assistance 7=Complete IndependenceSCALE: Activities may be completed 

with or without assistive devices.





6-Indepedent-patient completes the activity by him/herself with no assistance 

from a helper.


5-Set-up or Clean-up Assistance-helper sets up or cleans up; patient completes 

activity. [place] assists only prior to or  


    following the activity.


4-Supervision or Touching Assistance-helper provides verbal cues and/or 

touching/steadying and/or contact guard assistance as patient completes 

activity. Assistance may be provided   


    throughout the activity or intermittently.


3-Partial/Moderate Assistance-helper does LESS THAN HALF the effort. [place] 

lifts, holds or supports trunk or limbs, but provides less than half the effort.


2-Substantial/Maximal Assistance-helper does MORE THAN HALF the effort. [place] 

lifts or holds trunk or limbs and provides more than half the effort.


2-Dglesaqap-lwoypo does ALL the effort. Patient does none of the effort to com

plete the activity. Or, the assistance of 2 or more helpers is required for the 

patient to complete the  


    activity.


If activity was not attempted, code reason:


7-Patient Refused.


9-Not Applicable-not attempted and the patient did not perform the activity 

before the current illness, exacerbation or injury.


10-Not Attempted due to Environmental Limitations-(lack of equipment, weather 

restraints, etc.).


88-Not Attempted due to Medical Conditions or Safety Concerns.


Roll Left to Right (QC):  3


Sit to Lying (QC):  3


Sit to Stand (QC):  3


Chair/Bed-to-Chair Xfer(QC):  3


Car Transfer (QC):  3





Gait Training


Does the Patient Walk?:  Yes


Walk 10 feet (QC):  88


Walk 50 ft with 2 Turns(QC):  88


Walk 150 ft (QC):  88


Walking 10ft/uneven surface-QC:  88


Gait Assistive Device:  Parallel Bars





Wheelchair Training


Does the Pt Use a Wheelchair?:  Yes


Distance:  150'x3


Wheel 50 ft with 2 turns (QC):  2


Wheel 150 ft (QC):  2


Type of Wheelchair:  Manual





Stair Training


1 Step (curb) (QC):  88


4 Steps (QC):  88


12 Steps (QC):  88





Balance


Picking up an Object (QC):  88





ADL-Treatment


Eating (QC):  2


Oral Hygiene (QC):  3


Shower/Bathe Self (QC):  2 (Pt. is able to wash her chest and right arm.  She 

requires assistance to wash all other parts.  Pt. showers while seated on shower

chair.)


Upper Body Dressing (QC):  88 (Pt. verbalizes that she is tired.  OT assists 

with doffing/donning clean hospital gown.)


Lower Body Dressing (QC):  1


On/Off Footwear (QC):  2


Toileting Hygiene (QC):  1


Toilet Transfer (QC):  1





Assessment/Plan


Assessment and Plan


Assess & Plan/Chief Complaint


Assessment per Luke Bainbridge, MSIII:


Critical illness myopathy


S/P CVA involving left occipital artery


S/P 3 vessel CABG


Debility


Expressive aphasia


Dysarthria


Right sided weakness


Diabetes mellitus


COPD


CAD


Dysphagia requiring PEG


Dumont cath in place





Plan:


1. Ensure adequate anticoagulation


2. PT, OT, and SLP eval's and treat


3. Oxygen via NC- wean as tolerated


4. Resume Home medications


5. Fall precautions


6. Dietary consult


7. DVT prophylaxis





11/13/20:


Patient participating well


No pain reported


Continue current meds


Advance diet





11/14/20:


Dumont DC soon


Continue home meds


Therapies to continue protocol





11/15/20:


Dr Tawil appreciated


Monitor BP


Monitor labs





(1) CVA (cerebral vascular accident)


(2) Dysarthria


(3) Dysphagia


(4) PEG (percutaneous endoscopic gastrostomy) status


(5) Expressive aphasia


(6) Smoker


(7) COPD (chronic obstructive pulmonary disease)


(8) CAD (coronary artery disease)


(9) Hx of CABG


(10) Dumont catheter in place


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











ANDREY BAH DO                Nov 15, 2020 06:15

## 2020-11-15 NOTE — NUR
Pt spoke with, & saw her daughter, Monik & grandchild over Facetime tonight again. Pt 
really brightens up, smiles & escastic to see them.

## 2020-11-15 NOTE — NUR
PT'S DGTR CALLED LAST EVENING, & FACETIME'D W PT OVER LAPTOP. PT WAS THRILLED TO GET TO SEE 
& TALK W HER DGTR & GRANDKIDS.

## 2020-11-16 NOTE — OCCUPATIONAL THER DAILY NOTE
OT Current Status-Daily Note


Subjective


No pain reported.





Appearance


Pt. up in recliner when OT entered room.  Agrees to work with OT.





Mental Status/Objective


Patient Orientation:  Person





ADL-Treatment


Therapy Code Descriptions/Definitions 





Functional [place] Measure:


0=Not Assessed/NA        4=Minimal Assistance


1=Total Assistance        5=Supervision or Setup


2=Maximal Assistance  6=Modified [place]


3=Moderate Assistance 7=Complete IndependenceSCALE: Activities may be completed 

with or without assistive devices.





6-Indepedent-patient completes the activity by him/herself with no assistance 

from a helper.


5-Set-up or Clean-up Assistance-helper sets up or cleans up; patient completes 

activity. [place] assists only prior to or  


    following the activity.


4-Supervision or Touching Assistance-helper provides verbal cues and/or 

touching/steadying and/or contact guard assistance as patient completes 

activity. Assistance may be provided   


    throughout the activity or intermittently.


3-Partial/Moderate Assistance-helper does LESS THAN HALF the effort. [place] 

lifts, holds or supports trunk or limbs, but provides less than half the effort.


2-Substantial/Maximal Assistance-helper does MORE THAN HALF the effort. [place] 

lifts or holds trunk or limbs and provides more than half the effort.


1-Nashqjebl-tmbmtk does ALL the effort. Patient does none of the effort to 

complete the activity. Or, the assistance of 2 or more helpers is required for 

the patient to complete the  


    activity.


If activity was not attempted, code reason:


7-Patient Refused.


9-Not Applicable-not attempted and the patient did not perform the activity 

before the current illness, exacerbation or injury.


10-Not Attempted due to Environmental Limitations-(lack of equipment, weather 

restraints, etc.).


88-Not Attempted due to Medical Conditions or Safety Concerns.


Oral Hygiene (QC):  3 (Min assist to brush teeth.  Pt. able to bring to mouth 

with no cues, but then requires cues to swish and spit.)


Shower/Bathe Self (QC):  1 (Pt. requires dependent assistance overall to cleanse

self.  She has difficulty following cues and sequencing steps this date.  She is

handed a washcloth, but is unable to sequence how to cleanse her arm or chest.  

OT does this for her, as well as all other parts.  One person assists her with 

standing, while another assists with aurelio care.)


Upper Body Dressing (QC):  2 (Pt. is able to don over head, but requires cues 

and assistance to don each sleeve and then to pull down over torso.)


Lower Body Dressing (QC):  1


On/Off Footwear:  1





Other Treatment


Pt. tearful this date.  Having more difficulty with speech.  States, "good, 

good" throughout conversation and is having increased difficulty attending or 

following cues.  BP is 135/79, and sats are 98%.  Nursing aware.  Pt. made 

comfortable in chair.  All needs met.





Education


OT Patient Education:  Correct positioning, Modified ADL techniques, Progress 

toward Goal/Update tx plan, Purpose of tx/functional activities, Reviewed 

precautions, Rehab process, Transfer techniques


Teaching Recipient:  Patient


Teaching Methods:  Demonstration, Discussion


Response to Teaching:  Verbalize Understanding, Return Demonstration





OT Short Term Goals


Short Term Goals


Time Frame:  2020


Eating:  3


Oral hygiene:  3


Toileting hygiene:  3


Shower/bathe self:  3


Upper body dressin


Lower body dressing:  3


Putting on/taking off footwear:  3





OT Long Term Goals


Long Term Goals


Time Frame:  Dec 10, 2020


Eating (QC):  4


Oral Hygiene (QC):  4


Toileting Hygiene (QC):  3


Shower/Bathe Self (QC):  3


Upper Body Dressing (QC):  5


Lower Body Dressing (QC):  4


On/Off Footwear (QC):  4


Additional Goals:  1-Demonstrate ADL Tasks, 2-Verbalize Understanding, 3-

ImproveStrength/[place]


1=Demonstrate adherence to instructed precautions during ADL tasks.


2=Patient will verbalize/demonstrate understanding of assistive 

devices/modifications for ADL.


3=Patient will improve strength/tolerance for activity to enable patient to 

perform ADL's.





OT Education/Plan


Problem List/Assessment


Assessment:  Decreased Activ Tolerance, Decreased Safety Aware (Chair alarm 

set.), Decreased UE Strength, Dependent Transfers, Impaired Bed Mobility, 

Impaired Cognition, Impaired Funct Balance, Impaired I ADL's, Impaired Self-Care

Skills, Restricted Funct UE ROM





Discharge Recommendations


Plan/Recommendations:  Continue POC


Therapy Discharge Recommendati:  Post Acute OT





Treatment Plan/Plan of Care


Treatment,Training & Education:  Yes


Patient would benefit from OT for education, treatment and training to promote 

independence in ADL's, mobility, safety and/or upper extremity function for 

ADL's.


Plan of Care:  ADL Retraining, Functional Mobility, Group Exercise/Act as Ind, 

UE Funct Exercise/Act


Treatment Duration:  Dec 10, 2020


Frequency:  At least 5 of 7 days/Wk (IRF)


Estimated Hrs Per Day:  1.5 hours per day


Agreement:  Yes


Rehab Potential:  Fair





Time/GCodes


Start Time:  10:15


Stop Time:  11:00


Total Time Billed (hr/min):  45


Billed Treatment Time


1, ADL x 3











ROSANNA LOVING OT           2020 15:07

## 2020-11-16 NOTE — PM&R PROGRESS NOTE
Subjective


HPI/CC On Admission


Date Seen by Provider:  Nov 16, 2020


Time Seen by Provider:  09:00


Subjective/Events-last exam


11/16/20:


Dr. Tawil will discontinue the catheter and evaluate voiding trial today 


Flomax and Urecholine maintained 


Bowels are moving 


Very difficult to recover from catastrophic stroke








11/15/20:


Patient participating with therapy


No pain reported


Dr Tawil consulted for dumont catheter








11/14/20:


Patient denies pain


Working well with structured therapies


Dumont cath still in place will need to DC soon


Working on BM regimen








Patient doing well


Settling in well


No pain reported


Working with PT OT and ST


Eating a bit more without aspiration


No falls


Checked meds and labs


Conferred with RN


Reviewed therapy notes





Review of Systems


General:  Fatigue, Malaise


Genitourinary:  Retention


Neurological:  Weakness, Incoordination, Confusion





Objective


Exam


Vital Signs





Vital Signs








  Date Time  Temp Pulse Resp B/P (MAP) Pulse Ox O2 Delivery O2 Flow Rate FiO2


 


11/16/20 20:20      Room Air  


 


11/16/20 18:39     97   


 


11/16/20 18:00 37.0 84 20 151/72 (98)    


 


11/14/20 07:57       2.00 





Capillary Refill : Less Than 3 Seconds


General Appearance:  No Apparent Distress, Anxious, Chronically ill, Thin


HEENT:  PERRL/EOMI, Normal ENT Inspection, Pharynx Normal


Neck:  Full Range of Motion, Normal Inspection, Non Tender, Supple, Carotid 

Bruit


Respiratory:  Chest Non Tender, Lungs Clear, No Accessory Muscle Use, No 

Respiratory Distress, Decreased Breath Sounds


Cardiovascular:  Regular Rate, Rhythm, No Edema, No Gallop, No JVD, No Murmur, 

Normal Peripheral Pulses


Gastrointestinal:  Normal Bowel Sounds, No Organomegaly, No Pulsatile Mass, Non 

Tender, Soft


Back:  Normal Inspection, No CVA Tenderness, No Vertebral Tenderness


Extremity:  Normal Capillary Refill, Normal Inspection, Normal Range of Motion 

(except right sided weakness), Non Tender, No Calf Tenderness, No Pedal Edema


Neurologic/Psychiatric:  Alert, No Motor/Sensory Deficits, Normal Mood/Affect, 

Abnormal CNs II-XII, Aphasia, Facial Droop, Motor Weakness (right sided 

flaccidity)


Skin:  Normal Color, Warm/Dry


Lymphatic:  No Adenopathy





Results/Procedures


Lab


Laboratory Tests


11/16/20 05:31








Patient resulted labs reviewed.





FIM


Transfers


Therapy Code Descriptions/Definitions 





Functional [place] Measure:


0=Not Assessed/NA        4=Minimal Assistance


1=Total Assistance        5=Supervision or Setup


2=Maximal Assistance  6=Modified [place]


3=Moderate Assistance 7=Complete IndependenceSCALE: Activities may be completed 

with or without assistive devices.





6-Indepedent-patient completes the activity by him/herself with no assistance 

from a helper.


5-Set-up or Clean-up Assistance-helper sets up or cleans up; patient completes 

activity. [place] assists only prior to or  


    following the activity.


4-Supervision or Touching Assistance-helper provides verbal cues and/or 

touching/steadying and/or contact guard assistance as patient completes 

activity. Assistance may be provided   


    throughout the activity or intermittently.


3-Partial/Moderate Assistance-helper does LESS THAN HALF the effort. [place] 

lifts, holds or supports trunk or limbs, but provides less than half the effort.


2-Substantial/Maximal Assistance-helper does MORE THAN HALF the effort. [place] 

lifts or holds trunk or limbs and provides more than half the effort.


0-Adrdusguq-scvmlg does ALL the effort. Patient does none of the effort to 

complete the activity. Or, the assistance of 2 or more helpers is required for 

the patient to complete the  


    activity.


If activity was not attempted, code reason:


7-Patient Refused.


9-Not Applicable-not attempted and the patient did not perform the activity 

before the current illness, exacerbation or injury.


10-Not Attempted due to Environmental Limitations-(lack of equipment, weather 

restraints, etc.).


88-Not Attempted due to Medical Conditions or Safety Concerns.


Roll Left to Right (QC):  3


Sit to Lying (QC):  3


Sit to Stand (QC):  3


Chair/Bed-to-Chair Xfer(QC):  3


Car Transfer (QC):  3





Gait Training


Does the Patient Walk?:  Yes


Walk 10 feet (QC):  88


Walk 50 ft with 2 Turns(QC):  88


Walk 150 ft (QC):  88


Walking 10ft/uneven surface-QC:  88


Gait Assistive Device:  Parallel Bars





Wheelchair Training


Does the Pt Use a Wheelchair?:  Yes


Distance:  150'x3


Wheel 50 ft with 2 turns (QC):  2


Wheel 150 ft (QC):  2


Type of Wheelchair:  Manual





Stair Training


1 Step (curb) (QC):  88


4 Steps (QC):  88


12 Steps (QC):  88





Balance


Picking up an Object (QC):  88





ADL-Treatment


Eating (QC):  2


Oral Hygiene (QC):  3


Shower/Bathe Self (QC):  2 (Pt. is able to wash her chest and right arm.  She 

requires assistance to wash all other parts.  Pt. showers while seated on shower

chair.)


Upper Body Dressing (QC):  88 (Pt. verbalizes that she is tired.  OT assists 

with doffing/donning clean hospital gown.)


Lower Body Dressing (QC):  1


On/Off Footwear (QC):  2


Toileting Hygiene (QC):  1


Toilet Transfer (QC):  1





Assessment/Plan


Assessment and Plan


Assess & Plan/Chief Complaint


Assessment per Luke Bainbridge, MSIII:


Critical illness myopathy


S/P CVA involving left occipital artery


S/P 3 vessel CABG


Debility


Expressive aphasia


Dysarthria


Right sided weakness


Diabetes mellitus


COPD


CAD


Dysphagia requiring PEG


Dumont cath in place





Plan:


1. Ensure adequate anticoagulation


2. PT, OT, and SLP eval's and treat


3. Oxygen via NC- wean as tolerated


4. Resume Home medications


5. Fall precautions


6. Dietary consult


7. DVT prophylaxis





11/13/20:


Patient participating well


No pain reported


Continue current meds


Advance diet





11/14/20:


Dumont DC soon


Continue home meds


Therapies to continue protocol





11/15/20:


Dr Tawil appreciated


Monitor BP


Monitor labs





11/16/20:


Bladder meds


Appreciate Dr Tawil


PT OT ST





(1) CVA (cerebral vascular accident)


(2) Dysarthria


(3) Dysphagia


(4) PEG (percutaneous endoscopic gastrostomy) status


(5) Expressive aphasia


(6) Smoker


(7) COPD (chronic obstructive pulmonary disease)


(8) CAD (coronary artery disease)


(9) Hx of CABG


(10) Dumont catheter in place


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











ANDREY BAH DO                Nov 16, 2020 09:06

## 2020-11-16 NOTE — OCCUPATIONAL THER DAILY NOTE
OT Current Status-Daily Note


Subjective


Pt. becomes tearful during treatment.





Appearance


Pt. in bed.  Agrees to work with OT.





ADL-Treatment


Therapy Code Descriptions/Definitions 





Functional [place] Measure:


0=Not Assessed/NA        4=Minimal Assistance


1=Total Assistance        5=Supervision or Setup


2=Maximal Assistance  6=Modified [place]


3=Moderate Assistance 7=Complete IndependenceSCALE: Activities may be completed 

with or without assistive devices.





6-Indepedent-patient completes the activity by him/herself with no assistance 

from a helper.


5-Set-up or Clean-up Assistance-helper sets up or cleans up; patient completes 

activity. [place] assists only prior to or  


    following the activity.


4-Supervision or Touching Assistance-helper provides verbal cues and/or 

touching/steadying and/or contact guard assistance as patient completes 

activity. Assistance may be provided   


    throughout the activity or intermittently.


3-Partial/Moderate Assistance-helper does LESS THAN HALF the effort. [place] 

lifts, holds or supports trunk or limbs, but provides less than half the effort.


2-Substantial/Maximal Assistance-helper does MORE THAN HALF the effort. [place] 

lifts or holds trunk or limbs and provides more than half the effort.


1-Uioijfdxj-jbdgbs does ALL the effort. Patient does none of the effort to 

complete the activity. Or, the assistance of 2 or more helpers is required for 

the patient to complete the  


    activity.


If activity was not attempted, code reason:


7-Patient Refused.


9-Not Applicable-not attempted and the patient did not perform the activity 

before the current illness, exacerbation or injury.


10-Not Attempted due to Environmental Limitations-(lack of equipment, weather 

restraints, etc.).


88-Not Attempted due to Medical Conditions or Safety Concerns.





Other Treatment


Pt. in bed and OT wakes her up.  Pt. groggy but agrees to participate.  OT 

attempts to engage pt. in cognitive task such as naming the category that other 

things are a part of, or that one thing is a part of.  Due to aphasia, pt. is 

unable to state what she means and becomes frustrated.  She is tearful.  OT 

attempts to encourage.  Pt. participates in right UE AROM exercises to best of 

her ability for wrist extension, elbow flexion, finger flexion.  She is limited 

in her movements, but is able to rotate wrist.  She has what appears to be 

neglect of her right side, and is educated in being aware of right UE.  She is 

encouraged to look at her hand, and to attempt to grasp things from her tray wi

th it.  She verbalizes understanding.  OT gives pt. pen, and she requires cues 

to take if from OT.  She has in her left hand, and attempts to write her name on

paper as OT has encouraged her to do.  She is unable to do this, and becomes 

tearful again.  Pt. continues to be encouraged.  All needs are met at bed level.





Education


OT Patient Education:  Correct positioning, Modified ADL techniques, Progress 

toward Goal/Update tx plan, Purpose of tx/functional activities, Reviewed 

precautions, Rehab process, Transfer techniques


Teaching Recipient:  Patient


Teaching Methods:  Demonstration, Discussion


Response to Teaching:  Verbalize Understanding, Return Demonstration





OT Short Term Goals


Short Term Goals


Time Frame:  2020


Eating:  3


Oral hygiene:  3


Toileting hygiene:  3


Shower/bathe self:  3


Upper body dressin


Lower body dressing:  3


Putting on/taking off footwear:  3





OT Long Term Goals


Long Term Goals


Time Frame:  Dec 10, 2020


Eating (QC):  4


Oral Hygiene (QC):  4


Toileting Hygiene (QC):  3


Shower/Bathe Self (QC):  3


Upper Body Dressing (QC):  5


Lower Body Dressing (QC):  4


On/Off Footwear (QC):  4


Additional Goals:  1-Demonstrate ADL Tasks, 2-Verbalize Understanding, 3-

ImproveStrength/[place]ona


1=Demonstrate adherence to instructed precautions during ADL tasks.


2=Patient will verbalize/demonstrate understanding of assistive 

devices/modifications for ADL.


3=Patient will improve strength/tolerance for activity to enable patient to 

perform ADL's.





OT Education/Plan


Problem List/Assessment


Assessment:  Decreased Activ Tolerance, Decreased Safety Aware, Decreased UE 

Strength, Dependent Transfers, Impaired Bed Mobility, Impaired Cognition, 

Impaired Coordination, Impaired Funct Balance, Impaired I ADL's, Impaired Self-

Care Skills, Restricted Funct UE ROM, Visual-Perceptual Deficit





Discharge Recommendations


Plan/Recommendations:  Continue POC


Therapy Discharge Recommendati:  24 Hour Supervision, Post Acute OT





Treatment Plan/Plan of Care


Treatment,Training & Education:  Yes


Patient would benefit from OT for education, treatment and training to promote 

independence in ADL's, mobility, safety and/or upper extremity function for 

ADL's.


Plan of Care:  ADL Retraining, Functional Mobility, Group Exercise/Act as Ind, 

UE Funct Exercise/Act


Treatment Duration:  Dec 10, 2020


Frequency:  At least 5 of 7 days/Wk (IRF)


Estimated Hrs Per Day:  1.5 hours per day


Agreement:  Yes


Rehab Potential:  Fair





Time/GCodes


Start Time:  13:35


Stop Time:  14:05


Total Time Billed (hr/min):  30


Billed Treatment Time


1, FA x 15minutes, Ex x 15minutes











ROSANNA LOVING OT           2020 15:26

## 2020-11-16 NOTE — SPEECH THERAPY DAILY NOTE
Speech Daily Progress Note


Subjective


Date Seen by Provider:  Nov 16, 2020


Time Seen by Provider:  00:30


Patient was resting in her bed. Patient was alert and participated with BDE and 

speech therapy.





Objective


Patient completed simple speech therapy exercises including answering y/n and 

simple questions related to her person and environment at 80% with minimal cues.

The patient's speech is improving, however she does interject jargon with her 

normal speech pattern. Patient completed BDE with safe intake of thin liquids 

and regular texture foods without difficulty.





Assessment


Assessment Current Status:  Good Progress





Treatment Plan


Continue Plan of Care





Speech Short Term Goals


Short Term Goals


Short Term Goals


1) The patient will complete cognitive tasks related to memory, safety awareness

and problem solving at 80% with minimal cues.


2) The patient will complete speech tasks  to improve intelligibility at 80% 

with minimal cues.


3) The patient will complete confrontational naming tasks at 90% with minimal 

cues.


4) The patient will complete OME for improved oral status for safe oral intake 

at 80% or greater with minimal cues.





Speech Long Term Goals


Long Term Goals


Patient will improve communication abilities and safe oral intake in order to 

return to prior level.





Speech-Plan


Patient/Family Goals


Patient/Family Goals:  


Patient's discharge plans are unknown at this time.





Treatment Plan


Speech Therapy Treatment Plan:  Continue Plan of Care


Treatment Duration:  Nov 25, 2020


Frequency:  4 times per week (Patient will receive ST 4-5x per week)


Estimated Hrs Per Day:  .5 hour per day


Rehab Potential:  Fair


Barriers to Learning:  


Patient's recent CVA and age


Pt/Family Agrees to Plan:  Yes





Safety Risks/Education


Teaching Recipient:  Patient


Teaching Methods:  Demonstration, Discussion


Response to Teaching:  Verbalize Understanding, Return Demonstration


Education Topics Provided:  


Safety of oral intake strategies





Time


Speech Therapy Time In:  09:00


Speech Therapy Time Out:  09:30


Total Billed Time:  30


Billed Treatment Time


1, DYSEVS, DYST, SLTS


No











FRANCHESKA HOGAN            Nov 16, 2020 11:43

## 2020-11-16 NOTE — CARDIOLOGY PROGRESS NOTE
Subjective


Date Seen by Provider:  2020


Time Seen by Provider:  08:05


Subjective/Events-last exam


Patient is in bed, complains of fatigue. Denies any chest pain.





Objective-Cardiology


Exam


Last Set of Vital Signs





Vital Signs








 20





 07:57 06:00 09:00


 


Temp  36.7 


 


Pulse  86 


 


Resp  15 


 


B/P (MAP)  157/65 (95) 


 


Pulse Ox  97 


 


O2 Delivery   Room Air


 


O2 Flow Rate 2.00  





Capillary Refill : Less Than 3 Seconds


I&O











Intake and Output 


 


 20





 00:00


 


Intake Total 950 ml


 


Output Total 675 ml


 


Balance 275 ml


 


 


 


Intake Oral 950 ml


 


Output Urine Total 675 ml


 


# Bowel Movements 1








General:  Alert, Oriented X3, Cooperative


HEENT:  Atraumatic, PERRLA


Neck:  Supple, No JVD, No Thyromegaly


Lungs:  Clear to Auscultation, Normal Air Movement


Heart:  Regular Rate, Normal S1, Normal S2, No Murmurs


Abdomen:  Normal Bowel Sounds, Soft, No Tenderness, No Hepatosplenomegaly, No 

Masses


Extremities:  No Clubbing, No Cyanosis, No Edema, Normal Pulses, No 

Tenderness/Swelling


Skin:  No Rashes, No Breakdown, No Significant Lesion


Neuro:  Normal Gait, Normal Speech, Strength at 5/5 X4 Ext, Normal Tone, 

Sensation Intact


Psych/Mental Status:  Mental Status NL, Mood NL





Results


Lab


Laboratory Tests


20 05:31














A/P-Cardiology


Admission Diagnosis


CVA


Coronary artery disease


Hypertension


Hyperlipidemia





Assessment/Plan


Status post CVA with residual right hemiparesis, receiving PT OT, on Plavix and 

ASA.





Coronary artery disease status post CABG 3.  Continue to monitor at this time.





Hypertension, increase amlodipine to 10 mg daily





Hyperlipidemia, monitor lipids





Questionable peripheral arterial disease with ischemic event after her bypass 

has improved after conservative management, continue on Plavix





Diabetes mellitus, followed and managed by primary care physician





Debility, receiving physical therapy





Patient was seen and evaluated with Hedy, examination performed, management 

plan was discussed, agree with the current scribed note, I made few changes to 

the note using Italic font


Poorly controlled blood pressure, I will increase amlodipine to 10 mg daily


Continue to monitor blood pressure





Clinical Quality Measures


DVT/VTE Risk/Contraindication:


Risk Factor Score Per Nursin


RFS Level Per Nursing on Admit:  4+=Very High











HEDY BASHIR 2020 8:50 am


BROOKE SHEA MD             2020 11:18 am

## 2020-11-16 NOTE — PROGRESS NOTE - UROLOGY
Progress Note-Urology


Progress Notes/Assess & Plan


Progress/Assessment & Plan


TOV TODAY, EXPLAINED TO PATIENT. TOLERATES FLOMAX AND URECHOLINE


Final Diagnosis


URINE RETENTION











TAWIL,ELIAS A MD               Nov 16, 2020 10:18

## 2020-11-16 NOTE — PHYSICAL THERAPY DAILY NOTE
PT Daily Note-Current


Subjective


Pt sitting in recliner with feet up upon arrival.  Pt asking to return to bed 

upon arrival.





Pain





   Location:  No Pain Reported





Mental Status


Patient Orientation:  Person, [place]





Transfers


SCALE: Activities may be completed with or without assistive devices.





6-Indepedent-patient completes the activity by him/herself with no assistance 

from a helper.


5-Set-up or Clean-up Assistance-helper sets up or cleans up; patient completes 

activity. [place] assists only prior to or  


    following the activity.


4-Supervision or Touching Assistance-helper provides verbal cues and/or 

touching/steadying and/or contact guard assistance as patient completes 

activity. Assistance may be provided   


    throughout the activity or intermittently.


3-Partial/Moderate Assistance-helper does LESS THAN HALF the effort. [place] 

lifts, holds or supports trunk or limbs, but provides less than half the effort.


2-Substantial/Maximal Assistance-helper does MORE THAN HALF the effort. [place] 

lifts or holds trunk or limbs and provides more than half the effort.


8-Dkqwkefwh-onrqsq does ALL the effort. Patient does none of the effort to 

complete the activity. Or, the assistance of 2 or more helpers is required for 

the patient to complete the  


    activity.


If activity was not attempted, code reason:


7-Patient Refused.


9-Not Applicable-not attempted and the patient did not perform the activity 

before the current illness, exacerbation or injury.


10-Not Attempted due to Environmental Limitations-(lack of equipment, weather 

restraints, etc.).


88-Not Attempted due to Medical Conditions or Safety Concerns.


Sit to Lying (QC):  3


Sit to Stand (QC):  3


Chair/Bed-to-Chair Xfer(QC):  3





Weight Bearing


Right Lower Extremity:  Right


Full Weight Bearing


Left Lower Extremity:  Left


Full Weight Bearing





Treatments


Pt aksing to return to bed upon entering room.  PTA assists with TF to EOB as 

well as to Supine.  Pt is repositioned to comfort.  All needs met, call light in

hand.





Assessment


Current Status:  Fair Progress


Cognition and weakness continue to limit tx.





PT Short Term Goals


Short Term Goals


Time Frame:  2020


Roll Left & Right:  4


Sit to lyin


Lying to sitting on side of be:  3


Chair/bed-to-chair transfer:  3


Walk 10 feet:  3


Does pt use a wc or scooter:  Yes


Wheel 50ft w/2 turns:  3


Wheel 150 feet:  3





PT Long Term Goals


Long Term Goals


PT Long Term Goals Time Frame:  Dec 3, 2020


Roll Left & Right (QC):  6


Sit to Lying (QC):  6


Lying-Sitting on Side/Bed(QC):  6


Sit to Stand (QC):  4


Chair/Bed-to-Chair Xfer(QC):  4


Toilet Transfer (QC):  4


Car Transfer (QC):  4


Does the Patient Walk:  Yes


Walk 10 feet (QC):  3


Walk 50ft with 2 Turns (QC):  3


Walk 150 ft (QC):  88


Walking 10ft on Uneven Surface:  3


1 Step (curb) (QC):  3


4 Steps (QC):  88


12 Steps (QC):  88


Picking up an Object (QC):  88


Does the Pt use WC or Scooter?:  Yes


Wheel 50 feet with 2 turns (QC:  6


Wheel 150 feet:  6





PT Plan


Problem List


Problem List:  Activity Tolerance, Functional Strength, Safety, Balance, 

Transfer





Treatment/Plan


Treatment Plan:  Continue Plan of Care


Treatment Plan:  Bed Mobility, Education, Functional Activity Nona, Functional 

Strength, Group Therapy, Gait, Safety, Therapeutic Exercise, Transfers


Treatment Duration:  2020


Frequency:  At least 5 of 7 days/Wk (IRF)


Estimated Hrs Per Day:  1.5 hours per day


Patient and/or Family Agrees t:  Yes





Safety Risks/Education


Patient Education:  Transfer Techniques, Correct Positioning, Safety Issues


Teaching Recipient:  Patient


Teaching Methods:  Discussion


Response to Teaching:  Reinforcement Needed





Time/GCodes


Time In:  1300


Time Out:  1330


Total Billed Treatment Time:  30


Total Billed Treatment


1, FA x2 (30m)











PATEL WELLS PTA              2020 13:23

## 2020-11-16 NOTE — PHYSICAL THERAPY DAILY NOTE
PT Daily Note-Current


Subjective


Pt laying Supine in bed upon arrival.  Pt agrees to PT.





Pain





   Location:  No Pain Reported





Mental Status


Patient Orientation:  Person, [place]


Attachments:  [place] Catheter


Dr Tawil advises pt that this will be removed today to test ability to urinate 

on own.





Transfers


SCALE: Activities may be completed with or without assistive devices.





6-Indepedent-patient completes the activity by him/herself with no assistance 

from a helper.


5-Set-up or Clean-up Assistance-helper sets up or cleans up; patient completes 

activity. [place] assists only prior to or  


    following the activity.


4-Supervision or Touching Assistance-helper provides verbal cues and/or 

touching/steadying and/or contact guard assistance as patient completes 

activity. Assistance may be provided   


    throughout the activity or intermittently.


3-Partial/Moderate Assistance-helper does LESS THAN HALF the effort. [place] 

lifts, holds or supports trunk or limbs, but provides less than half the effort.


2-Substantial/Maximal Assistance-helper does MORE THAN HALF the effort. [place] 

lifts or holds trunk or limbs and provides more than half the effort.


5-Tqgzkzhta-dasxzd does ALL the effort. Patient does none of the effort to 

complete the activity. Or, the assistance of 2 or more helpers is required for 

the patient to complete the  


    activity.


If activity was not attempted, code reason:


7-Patient Refused.


9-Not Applicable-not attempted and the patient did not perform the activity 

before the current illness, exacerbation or injury.


10-Not Attempted due to Environmental Limitations-(lack of equipment, weather 

restraints, etc.).


88-Not Attempted due to Medical Conditions or Safety Concerns.


Lying to Sitting/Side of Bed(Q:  3


Sit to Stand (QC):  4


Chair/Bed-to-Chair Xfer(QC):  3


SPT from EOB to recliner at Mod A.





Weight Bearing


Right Lower Extremity:  Right


Full Weight Bearing


Left Lower Extremity:  Left


Full Weight Bearing





Exercises


Supine Ex:  Ankle pumps, Quad Set, Glut sets, Heel Slides, Hip abd/add


Supine Reps:  15





Treatments


Pt completes Supine EX with instruction/TC & VC for completion.  Dr Dodge & Dr Tawil see pt during tx.  Pt transfers to EOB then to recliner via SPT with PTA. 

Pt repositioned and resting at end of tx. All needs met, call light in hand.





Assessment


Current Status:  Fair Progress


Cognition and weakness limit tx at this time.





PT Short Term Goals


Short Term Goals


Time Frame:  2020


Roll Left & Right:  4


Sit to lyin


Lying to sitting on side of be:  3


Chair/bed-to-chair transfer:  3


Walk 10 feet:  3


Does pt use a wc or scooter:  Yes


Wheel 50ft w/2 turns:  3


Wheel 150 feet:  3





PT Long Term Goals


Long Term Goals


PT Long Term Goals Time Frame:  Dec 3, 2020


Roll Left & Right (QC):  6


Sit to Lying (QC):  6


Lying-Sitting on Side/Bed(QC):  6


Sit to Stand (QC):  4


Chair/Bed-to-Chair Xfer(QC):  4


Toilet Transfer (QC):  4


Car Transfer (QC):  4


Does the Patient Walk:  Yes


Walk 10 feet (QC):  3


Walk 50ft with 2 Turns (QC):  3


Walk 150 ft (QC):  88


Walking 10ft on Uneven Surface:  3


1 Step (curb) (QC):  3


4 Steps (QC):  88


12 Steps (QC):  88


Picking up an Object (QC):  88


Does the Pt use WC or Scooter?:  Yes


Wheel 50 feet with 2 turns (QC:  6


Wheel 150 feet:  6





PT Plan


Problem List


Problem List:  Activity Tolerance, Functional Strength, Safety, Balance, Gait, 

Transfer





Treatment/Plan


Treatment Plan:  Continue Plan of Care


Treatment Plan:  Bed Mobility, Education, Functional Activity Nona, Functional 

Strength, Group Therapy, Gait, Safety, Therapeutic Exercise, Transfers


Treatment Duration:  2020


Frequency:  At least 5 of 7 days/Wk (IRF)


Estimated Hrs Per Day:  1.5 hours per day


Patient and/or Family Agrees t:  Yes





Safety Risks/Education


Patient Education:  Transfer Techniques, Correct Positioning, Safety Issues


Teaching Recipient:  Patient


Teaching Methods:  Discussion


Response to Teaching:  Reinforcement Needed





Time/GCodes


Time In:  930


Time Out:  1015


Total Billed Treatment Time:  45


Total Billed Treatment


1, EX x2 (25m) & FA (20m)











PATEL WELLS PTA              2020 10:26

## 2020-11-17 NOTE — PHYSICAL THERAPY DAILY NOTE
PT Daily Note-Current


Subjective


Pt. states repeatedly "OK baby" Ple[place] and follows instructions 80% of time 

100% with tactile cues.





Pain





   Location:  No Pain Reported





Appearance


dyspneic with activity but O2 sats >90%





Mental Status


Patient Orientation:  Non-Verbal/Aphasic





Transfers


SCALE: Activities may be completed with or without assistive devices.





6-Indepedent-patient completes the activity by him/herself with no assistance 

from a helper.


5-Set-up or Clean-up Assistance-helper sets up or cleans up; patient completes 

activity. [place] assists only prior to or  


    following the activity.


4-Supervision or Touching Assistance-helper provides verbal cues and/or 

touching/steadying and/or contact guard assistance as patient completes 

activity. Assistance may be provided   


    throughout the activity or intermittently.


3-Partial/Moderate Assistance-helper does LESS THAN HALF the effort. [place] 

lifts, holds or supports trunk or limbs, but provides less than half the effort.


2-Substantial/Maximal Assistance-helper does MORE THAN HALF the effort. [place] 

lifts or holds trunk or limbs and provides more than half the effort.


9-Pxqpevvkp-natpxd does ALL the effort. Patient does none of the effort to 

complete the activity. Or, the assistance of 2 or more helpers is required for 

the patient to complete the  


    activity.


If activity was not attempted, code reason:


7-Patient Refused.


9-Not Applicable-not attempted and the patient did not perform the activity 

before the current illness, exacerbation or injury.


10-Not Attempted due to Environmental Limitations-(lack of equipment, weather 

restraints, etc.).


88-Not Attempted due to Medical Conditions or Safety Concerns.


Roll Left & Right (QC):  4


Sit to Lying (QC):  4


Lying to Sitting/Side of Bed(Q:  4


Sit to Stand (QC):  3


Chair/Bed-to-Chair Xfer(QC):  3


Toilet Transfer (QC):  3





Weight Bearing


Right Lower Extremity:  Right


Full Weight Bearing


Left Lower Extremity:  Left


Full Weight Bearing





Gait Training


Does the Patient Walk?:  Yes


Walk 10 feet (QC):  3


Gait Persons Needed:  2


Gait Assistive Device:  Walker Platform


initially gait trained in // bars, mod assist approx 5 ft x 2, then trialed FWW 

w platf. pt. needs assist to advance FWW and part time to advance RLE, wlaks 

slowly, fatigues quickly





Exercises


Supine Ex:  Bridging, Ankle pumps, Rolling, Heel Slides, Short Arc Quads, 

Scooting, Straight leg raise, Hip abd/add


Supine Reps:  15


Seated Therapy Exercises:  Ankle pumps, Sit to stand, Long arc quads, Hip 

flexion, Hip abd/add


Seated Reps:  15


NuStep Minutes:  5


 NuStep Workload:  2





Assessment


Current Status:  Good Progress





PT Short Term Goals


Short Term Goals


Time Frame:  2020


Roll Left & Right:  4


Sit to lyin


Lying to sitting on side of be:  3


Chair/bed-to-chair transfer:  3


Walk 10 feet:  3


Does pt use a wc or scooter:  Yes


Wheel 50ft w/2 turns:  3


Wheel 150 feet:  3





PT Long Term Goals


Long Term Goals


PT Long Term Goals Time Frame:  Dec 3, 2020


Roll Left & Right (QC):  6


Sit to Lying (QC):  6


Lying-Sitting on Side/Bed(QC):  6


Sit to Stand (QC):  4


Chair/Bed-to-Chair Xfer(QC):  4


Toilet Transfer (QC):  4


Car Transfer (QC):  4


Does the Patient Walk:  Yes


Walk 10 feet (QC):  3


Walk 50ft with 2 Turns (QC):  3


Walk 150 ft (QC):  88


Walking 10ft on Uneven Surface:  3


1 Step (curb) (QC):  3


4 Steps (QC):  88


12 Steps (QC):  88


Picking up an Object (QC):  88


Does the Pt use WC or Scooter?:  Yes


Wheel 50 feet with 2 turns (QC:  6


Wheel 150 feet:  6





PT Plan


Treatment/Plan


Treatment Plan:  Continue Plan of Care


Treatment Plan:  Bed Mobility, Education, Functional Activity Nona, Functional 

Strength, Group Therapy, Gait, Safety, Therapeutic Exercise, Transfers


Treatment Duration:  2020


Frequency:  At least 5 of 7 days/Wk (IRF)


Estimated Hrs Per Day:  1.5 hours per day


Patient and/or Family Agrees t:  Yes





Safety Risks/Education


Patient Education:  Gait Training, Transfer Techniques, Correct Positioning, 

Disease Process, Safety Issues


Teaching Recipient:  Patient


Teaching Methods:  Demonstration, Discussion


Response to Teaching:  Verbalize Understanding, Return Demonstration, 

Reinforcement Needed





Time/GCodes


Time In:  1100


Time Out:  1215


Total Billed Treatment Time:  75


Total Billed Treatment


1,GT20m,FA25m,EX15m











ARRON WHATLEY PTA             2020 12:12

## 2020-11-17 NOTE — OCCUPATIONAL THER DAILY NOTE
OT Current Status-Daily Note


Subjective


Pt. very groggy this morning.  Has great difficulty with words, but does not 

indicate pain.





Mental Status/Objective


Patient Orientation:  Person





ADL-Treatment


Therapy Code Descriptions/Definitions 





Functional [place] Measure:


0=Not Assessed/NA        4=Minimal Assistance


1=Total Assistance        5=Supervision or Setup


2=Maximal Assistance  6=Modified [place]


3=Moderate Assistance 7=Complete IndependenceSCALE: Activities may be completed 

with or without assistive devices.





6-Indepedent-patient completes the activity by him/herself with no assistance f

rom a helper.


5-Set-up or Clean-up Assistance-helper sets up or cleans up; patient completes 

activity. [place] assists only prior to or  


    following the activity.


4-Supervision or Touching Assistance-helper provides verbal cues and/or 

touching/steadying and/or contact guard assistance as patient completes 

activity. Assistance may be provided   


    throughout the activity or intermittently.


3-Partial/Moderate Assistance-helper does LESS THAN HALF the effort. [place] 

lifts, holds or supports trunk or limbs, but provides less than half the effort.


2-Substantial/Maximal Assistance-helper does MORE THAN HALF the effort. [place] 

lifts or holds trunk or limbs and provides more than half the effort.


3-Lhqesehqa-snrvgx does ALL the effort. Patient does none of the effort to 

complete the activity. Or, the assistance of 2 or more helpers is required for 

the patient to complete the  


    activity.


If activity was not attempted, code reason:


7-Patient Refused.


9-Not Applicable-not attempted and the patient did not perform the activity 

before the current illness, exacerbation or injury.


10-Not Attempted due to Environmental Limitations-(lack of equipment, weather 

restraints, etc.).


88-Not Attempted due to Medical Conditions or Safety Concerns.


Oral Hygiene (QC):  3 (Min assist overall to brush teeth.  Pt. is handed 

toothbrush with paste already on it.  OT directs it to her mouth and she 

brushes.  OT holds glass to her mouth so she can take drink to rinse.  OT wipes 

off mouth.)


Shower/Bathe Self (QC):  7 (OT asks pt. if she would like to shower.  She 

states, "nah.")


Upper Body Dressing (QC):  2 (Pt. attempts to doff her t-shirt but becomes very 

frustrated when she can't grasp it, or sequence how to do it.  OT assists and 

then assists with donning shirt.  Pt able to pull down over torso.)


Lower Body Dressing (QC):  2 (Pt. requires max assist with donning brief and 

pants.)


On/Off Footwear:  1 (Dependent with slipper socks.)


Toileting Hygiene (QC):  1 (Pt. slightly incontinent of bowel.  Pt. sits on BS 

with pillows for positioning, and is able to have more BM, but unable to 

urinate.  Requires max assist for one person to stand with her and someone else 

to cleanse her and pull pants up.)


Toilet Transfer (QC):  3 (Mod assist to transfer to BS with therapist in front 

of her.)





Other Treatment


Pt. very groggy this a.m.  Kept repeating, "good, good" in aphasic pattern to 

answer any questions OT asked.  Pt. unable to sequence at first or problem solve

any activity.  Nursing notified and took BP.  BP slightly high but okay, (please

see nursing notes.)  OT transferred pt. to wheelchair with mod assist, and pt. 

was taken to sink to brush hair and teeth.  She took the brush when it was 

handed to her and put it in her mouth.  Required hand over hand assist to brush 

her hair.  Pt. more alert at this time and was taken to therapy gym.  Pt. unable

to follow any cues to sequence any simple activity that OT gave her.  She was 

unable to understand verbally what OT was telling her, or visually how OT was 

showing her.  Pt. given small nuts and was asked to place them in a container.  

She doesn't initiate this with out max prompting, and then picks them up and 

starts to place in her mouth.  She did this several time and was directed to the

container.  She was unable to color in a shape with her left hand on a piece of 

paper, and instead kept drawing on the other side.  She was unable to place 

therapy pegs into holes when they were handed to her.  Pt. taken back to room 

and transferred to reclining chair with chair alarm in place.  All needs met.





Education


OT Patient Education:  Correct positioning, Exercise program, Modified ADL 

techniques, Progress toward Goal/Update tx plan, Purpose of tx/functional 

activities, Reviewed precautions, Rehab process, Transfer techniques


Teaching Recipient:  Patient


Teaching Methods:  Demonstration, Discussion


Response to Teaching:  Unable to Return Demonstration, Unable to Comprehend, 

Reinforcement Needed





OT Short Term Goals


Short Term Goals


Time Frame:  2020


Eating:  3


Oral hygiene:  3


Toileting hygiene:  3


Shower/bathe self:  3


Upper body dressin


Lower body dressing:  3


Putting on/taking off footwear:  3





OT Long Term Goals


Long Term Goals


Time Frame:  Dec 10, 2020


Eating (QC):  4


Oral Hygiene (QC):  4


Toileting Hygiene (QC):  3


Shower/Bathe Self (QC):  3


Upper Body Dressing (QC):  5


Lower Body Dressing (QC):  4


On/Off Footwear (QC):  4


Additional Goals:  1-Demonstrate ADL Tasks, 2-Verbalize Understanding, 3-

ImproveStrength/Nona


1=Demonstrate adherence to instructed precautions during ADL tasks.


2=Patient will verbalize/demonstrate understanding of assistive 

devices/modifications for ADL.


3=Patient will improve strength/tolerance for activity to enable patient to 

perform ADL's.





OT Education/Plan


Problem List/Assessment


Assessment:  Decreased Activ Tolerance, Decreased Safety Aware, Decreased UE 

Strength, Dependent Transfers, Impaired Bed Mobility, Impaired Cognition, 

Impaired Coordination, Impaired Funct Balance, Impaired I ADL's, Impaired Self-

Care Skills, Restricted Funct UE ROM, Visual-Perceptual Deficit


Visual perceptual issues difficult to pinpoint due to severe global aphasia.





Discharge Recommendations


Plan/Recommendations:  Continue POC


Therapy Discharge Recommendati:  24 Hour Supervision, Post Acute OT





Treatment Plan/Plan of Care


Treatment,Training & Education:  Yes


Patient would benefit from OT for education, treatment and training to promote 

independence in ADL's, mobility, safety and/or upper extremity function for 

ADL's.


Plan of Care:  ADL Retraining, Functional Mobility, Group Exercise/Act as Ind, 

UE Funct Exercise/Act


Treatment Duration:  Dec 10, 2020


Frequency:  At least 5 of 7 days/Wk (IRF)


Estimated Hrs Per Day:  1.5 hours per day


Agreement:  Yes


Rehab Potential:  Fair





Time/GCodes


Start Time:  09:30


Stop Time:  10:45


Total Time Billed (hr/min):  75


Billed Treatment Time


1, ADL x 45minutes, FA x 30minutes











ROSANNA LOVING OT           2020 11:54

## 2020-11-17 NOTE — PROGRESS NOTE - UROLOGY
Progress Note-Urology


Progress Notes/Assess & Plan


Progress/Assessment & Plan


FAILED TOV. REINSERT GALEANO AND INCREASE URECHOLINE TO 25 AC AND HS. TOV THURSDAY


Final Diagnosis


URINE RETENTION











TAWIL,ELIAS A MD               Nov 17, 2020 11:59

## 2020-11-17 NOTE — SPEECH THERAPY DAILY NOTE
Speech Daily Progress Note


Subjective


Date Seen by Provider:  Nov 17, 2020


Time Seen by Provider:  00:30


Patient was resting in her bed when I entered her room. She was alert and 

participated well.





Objective


Patient completed "Fill in the blank" for general information sentences at 70% 

with moderate cues. Patient's speech continues to improve with relevant 

words/phrases interjected with jargon.


Patient is utilizing safe intake of liquids with small sips given minimal cues.





Assessment


Assessment Current Status:  Good Progress





Treatment Plan


Continue Plan of Care





Speech Short Term Goals


Short Term Goals


Short Term Goals


1) The patient will complete cognitive tasks related to memory, safety awareness

and problem solving at 80% with minimal cues.


2) The patient will complete speech tasks  to improve intelligibility at 80% 

with minimal cues.


3) The patient will complete confrontational naming tasks at 90% with minimal 

cues.


4) The patient will complete OME for improved oral status for safe oral intake 

at 80% or greater with minimal cues.





Speech Long Term Goals


Long Term Goals


Patient will improve communication abilities and safe oral intake in order to 

return to prior level.





Speech-Plan


Patient/Family Goals


Patient/Family Goals:  


Patient's discharge plans are not known at this time. Rehab team mtg


tomorrow will discuss her options.





Treatment Plan


Speech Therapy Treatment Plan:  Continue Plan of Care


Treatment Duration:  Nov 25, 2020


Frequency:  4 times per week (Patient will receive ST 4-5x per week)


Estimated Hrs Per Day:  .5 hour per day


Rehab Potential:  Fair


Barriers to Learning:  


Patient's recent CVA


Pt/Family Agrees to Plan:  Yes





Safety Risks/Education


Teaching Recipient:  Patient


Teaching Methods:  Demonstration, Discussion


Response to Teaching:  Verbalize Understanding, Return Demonstration


Education Topics Provided:  


Continued safety within her room and communication of wants/needs


Continued safety of oral intake





Time


Speech Therapy Time In:  09:00


Speech Therapy Time Out:  09:30


Total Billed Time:  30


Billed Treatment Time


1, SEAN, FRANCHESKA Jacob            Nov 17, 2020 11:20

## 2020-11-17 NOTE — NUR
Patient had dumont removed yesterday evening around 1700. Patient bladder scanned last night 
at 2100 with an estimated 38 ml in bladder. Patient has not voided this shift. Aide 
attempted to bladder scan patient this morning and patient refused. RN went into the room 
with the aide and explained that we needed to bladder scan her. Patient said no you don't 
and was adamant that she didn't want to be scanned.

## 2020-11-17 NOTE — PM&R PROGRESS NOTE
Subjective


HPI/CC On Admission


Date Seen by Provider:  Nov 17, 2020


Time Seen by Provider:  09:00


Subjective/Events-last exam


11/17/20:


Xanax given last night 


Bowels moved yesterday 


Refusing to eat supper or breakfast today 


Agitated a bit today 


Post-void residual had 238


Hadnt voided since discontinued the cath yesterday 








11/16/20:


Dr. Tawil will discontinue the catheter and evaluate voiding trial today 


Flomax and Urecholine maintained 


Bowels are moving 


Very difficult to recover from catastrophic stroke








11/15/20:


Patient participating with therapy


No pain reported


Dr Tawil consulted for dumont catheter








11/14/20:


Patient denies pain


Working well with structured therapies


Dumont cath still in place will need to DC soon


Working on BM regimen








Patient doing well


Settling in well


No pain reported


Working with PT OT and ST


Eating a bit more without aspiration


No falls


Checked meds and labs


Conferred with RN


Reviewed therapy notes





Review of Systems


General:  Fatigue, Malaise


Neurological:  Weakness, Incoordination, Confusion





Objective


Exam


Vital Signs





Vital Signs








  Date Time  Temp Pulse Resp B/P (MAP) Pulse Ox O2 Delivery O2 Flow Rate FiO2


 


11/17/20 20:35      Room Air  


 


11/17/20 16:27 37.3 89 20 131/62 (85) 98   


 


11/14/20 07:57       2.00 





Capillary Refill : Less Than 3 Seconds


General Appearance:  No Apparent Distress, Anxious, Chronically ill, Thin


HEENT:  PERRL/EOMI, Normal ENT Inspection, Pharynx Normal


Neck:  Full Range of Motion, Normal Inspection, Non Tender, Supple, Carotid 

Bruit


Respiratory:  Chest Non Tender, Lungs Clear, No Accessory Muscle Use, No 

Respiratory Distress, Decreased Breath Sounds


Cardiovascular:  Regular Rate, Rhythm, No Edema, No Gallop, No JVD, No Murmur, 

Normal Peripheral Pulses


Gastrointestinal:  Normal Bowel Sounds, No Organomegaly, No Pulsatile Mass, Non 

Tender, Soft


Back:  Normal Inspection, No CVA Tenderness, No Vertebral Tenderness


Extremity:  Normal Capillary Refill, Normal Inspection, Normal Range of Motion 

(except right sided weakness), Non Tender, No Calf Tenderness, No Pedal Edema


Neurologic/Psychiatric:  Alert, No Motor/Sensory Deficits, Normal Mood/Affect, 

Abnormal CNs II-XII, Aphasia, Facial Droop, Motor Weakness (right sided 

flaccidity)


Skin:  Normal Color, Warm/Dry


Lymphatic:  No Adenopathy





Results/Procedures


Lab


Patient resulted labs reviewed.





FIM


Transfers


Therapy Code Descriptions/Definitions 





Functional [place] Measure:


0=Not Assessed/NA        4=Minimal Assistance


1=Total Assistance        5=Supervision or Setup


2=Maximal Assistance  6=Modified [place]


3=Moderate Assistance 7=Complete IndependenceSCALE: Activities may be completed 

with or without assistive devices.





6-Indepedent-patient completes the activity by him/herself with no assistance 

from a helper.


5-Set-up or Clean-up Assistance-helper sets up or cleans up; patient completes 

activity. [place] assists only prior to or  


    following the activity.


4-Supervision or Touching Assistance-helper provides verbal cues and/or 

touching/steadying and/or contact guard assistance as patient completes 

activity. Assistance may be provided   


    throughout the activity or intermittently.


3-Partial/Moderate Assistance-helper does LESS THAN HALF the effort. [place] 

lifts, holds or supports trunk or limbs, but provides less than half the effort.


2-Substantial/Maximal Assistance-helper does MORE THAN HALF the effort. [place] 

lifts or holds trunk or limbs and provides more than half the effort.


2-Ukfyfickn-ixsnbm does ALL the effort. Patient does none of the effort to 

complete the activity. Or, the assistance of 2 or more helpers is required for 

the patient to complete the  


    activity.


If activity was not attempted, code reason:


7-Patient Refused.


9-Not Applicable-not attempted and the patient did not perform the activity 

before the current illness, exacerbation or injury.


10-Not Attempted due to Environmental Limitations-(lack of equipment, weather 

restraints, etc.).


88-Not Attempted due to Medical Conditions or Safety Concerns.


Roll Left to Right (QC):  3


Sit to Lying (QC):  3


Sit to Stand (QC):  3


Chair/Bed-to-Chair Xfer(QC):  3


Car Transfer (QC):  3





Gait Training


Does the Patient Walk?:  Yes


Walk 10 feet (QC):  88


Walk 50 ft with 2 Turns(QC):  88


Walk 150 ft (QC):  88


Walking 10ft/uneven surface-QC:  88


Gait Assistive Device:  Parallel Bars





Wheelchair Training


Does the Pt Use a Wheelchair?:  Yes


Distance:  150'x3


Wheel 50 ft with 2 turns (QC):  2


Wheel 150 ft (QC):  2


Type of Wheelchair:  Manual





Stair Training


1 Step (curb) (QC):  88


4 Steps (QC):  88


12 Steps (QC):  88





Balance


Picking up an Object (QC):  88





ADL-Treatment


Eating (QC):  2


Oral Hygiene (QC):  3 (Min assist to brush teeth.  Pt. able to bring to mouth 

with no cues, but then requires cues to swish and spit.)


Shower/Bathe Self (QC):  1 (Pt. requires dependent assistance overall to cleanse

self.  She has difficulty following cues and sequencing steps this date.  She is

handed a washcloth, but is unable to sequence how to cleanse her arm or chest.  

OT does this for her, as well as all other parts.  One person assists her with 

standing, while another assists with aurelio care.)


Upper Body Dressing (QC):  2 (Pt. is able to don over head, but requires cues 

and assistance to don each sleeve and then to pull down over torso.)


Lower Body Dressing (QC):  1


On/Off Footwear (QC):  1


Toileting Hygiene (QC):  1


Toilet Transfer (QC):  1





Assessment/Plan


Assessment and Plan


Assess & Plan/Chief Complaint


Assessment per Luke Bainbridge, MSIII:


Critical illness myopathy


S/P CVA involving left occipital artery


S/P 3 vessel CABG


Debility


Expressive aphasia


Dysarthria


Right sided weakness


Diabetes mellitus


COPD


CAD


Dysphagia requiring PEG


Dumont cath in place





Plan:


1. Ensure adequate anticoagulation


2. PT, OT, and SLP eval's and treat


3. Oxygen via NC- wean as tolerated


4. Resume Home medications


5. Fall precautions


6. Dietary consult


7. DVT prophylaxis





11/13/20:


Patient participating well


No pain reported


Continue current meds


Advance diet





11/14/20:


Dumont DC soon


Continue home meds


Therapies to continue protocol





11/15/20:


Dr Tawil appreciated


Monitor BP


Monitor labs





11/16/20:


Bladder meds


Appreciate Dr Tawil


PT OT ST





11/17/20:


Voiding trial


Monitor closely


Variable moods





(1) CVA (cerebral vascular accident)


(2) Dysarthria


(3) Dysphagia


(4) PEG (percutaneous endoscopic gastrostomy) status


(5) Expressive aphasia


(6) Smoker


(7) COPD (chronic obstructive pulmonary disease)


(8) CAD (coronary artery disease)


(9) Hx of CABG


(10) Dumont catheter in place


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











ANDREY BAH DO                Nov 17, 2020 05:44

## 2020-11-18 NOTE — OCCUPATIONAL THER DAILY NOTE
OT Current Status-Daily Note


Subjective


No pain reported.





Mental Status/Objective


Patient Orientation:  Person


Attachments:  [place] Catheter





ADL-Treatment


Therapy Code Descriptions/Definitions 





Functional [place] Measure:


0=Not Assessed/NA        4=Minimal Assistance


1=Total Assistance        5=Supervision or Setup


2=Maximal Assistance  6=Modified [place]


3=Moderate Assistance 7=Complete IndependenceSCALE: Activities may be completed 

with or without assistive devices.





6-Indepedent-patient completes the activity by him/herself with no assistance 

from a helper.


5-Set-up or Clean-up Assistance-helper sets up or cleans up; patient completes 

activity. [place] assists only prior to or  


    following the activity.


4-Supervision or Touching Assistance-helper provides verbal cues and/or 

touching/steadying and/or contact guard assistance as patient completes 

activity. Assistance may be provided   


    throughout the activity or intermittently.


3-Partial/Moderate Assistance-helper does LESS THAN HALF the effort. [place] 

lifts, holds or supports trunk or limbs, but provides less than half the effort.


2-Substantial/Maximal Assistance-helper does MORE THAN HALF the effort. [place] 

lifts or holds trunk or limbs and provides more than half the effort.


2-Gyffnyqyn-xnpnal does ALL the effort. Patient does none of the effort to 

complete the activity. Or, the assistance of 2 or more helpers is required for 

the patient to complete the  


    activity.


If activity was not attempted, code reason:


7-Patient Refused.


9-Not Applicable-not attempted and the patient did not perform the activity 

before the current illness, exacerbation or injury.


10-Not Attempted due to Environmental Limitations-(lack of equipment, weather 

restraints, etc.).


88-Not Attempted due to Medical Conditions or Safety Concerns.


Eating (QC):  2 (Max assist to initiate holding spoon, and then able to only 

hold briefly without OT putting it back in hand.  [place] assist to bring spoon to 

mouth.  Pt. unable to hold the dish in other hand.  Pt. used left hand to feed.)


Shower/Bathe Self (QC):  2 (Pt. has difficulty initiating any bathing task.  She

is handed washcloth and shown where to wash.  She might "dab" at that spot, but 

then requires assistance to wash it thoroughly.  OT washed all parts.)


Upper Body Dressing (QC):  2 (Pt. is unable to thread arms or head, but is able 

to assist with pulling shirt down over torso.)


Lower Body Dressing (QC):  1 (Max assist to don brief and pants.  Assist of one 

person to stand while another person pulls them over hips.)


On/Off Footwear:  1


Toileting Hygiene (QC):  1 (Pt. incontinent of bowel in shower.)





Other Treatment


Pt. seen for two different sessions this a.m.  At first session,([id number]),pt. 

participated in bilateral coordination tasks using functional activities.  Pt. 

given pack of crackers and was encouraged to attempt to open them.  She was not 

able to do this and was not able to shift into her left hand.  Pt. attempted 

with pack of plastic utensils that had been given to her.  She put them in her 

mouth while in plastic but did not attempt to bite them or tear the plastic.  OT

asks pt. if she would like some ice cream and she would.  This was okay'd by 

nursing, and so OT brought in ice cream.  Pt. unable to determine what to do 

with each thing.  She was given spoon in her left hand, since she has strength 

in that hand, and pt. did not seem to understand how to hold spoon.  OT showed 

her, and then placed again with [place] assist.  OT dipped the spoon in ice cream, 

but pt. did not know how to bring it to mouth.  OT provided [place] assist and put 

spoon into mouth.  Pt. was then able to go back and forth with spoon in left 

hand, from cup to mouth.  All needs met and OT came back after speech therapy, 

from ([id number]).  Pt. transferred supine-sit with mod assist, and was able to 

scoot to side of bed.  Transferred with  mod assist to shower chair, and taken 

to shower room.  Pt. required max cues to initiate and sequence each step of 

showering.  OT assisted with all areas, and then with dressing as well.  Noted 

pt tearful at times but also states, "good, good," and "okay baby."  Pt. 

transferred to wheelchair from shower chair with mod assist.  Taken to therapy 

gym and completed balloon bat activity, with encouragement to use right hand.  

Pt. would begin to, but was unable to lift at shoulder.  Participated with left 

UE, with cues.  Taken back to room and transferred to reclining chair with mod 

assist.  All needs met and pt. with bed alarm.





Education


OT Patient Education:  Correct positioning, Exercise program, Modified ADL 

techniques, Progress toward Goal/Update tx plan, Purpose of tx/functional 

activities, Reviewed precautions, Rehab process, Transfer techniques


Teaching Recipient:  Patient


Teaching Methods:  Demonstration, Discussion


Response to Teaching:  Verbalize Understanding, Reinforcement Needed





OT Short Term Goals


Short Term Goals


Time Frame:  2020


Eating:  3


Oral hygiene:  3


Toileting hygiene:  3


Shower/bathe self:  3


Upper body dressin


Lower body dressing:  3


Putting on/taking off footwear:  3





OT Long Term Goals


Long Term Goals


Time Frame:  Dec 10, 2020


Eating (QC):  4


Oral Hygiene (QC):  4


Toileting Hygiene (QC):  3


Shower/Bathe Self (QC):  3


Upper Body Dressing (QC):  5


Lower Body Dressing (QC):  4


On/Off Footwear (QC):  4


Additional Goals:  1-Demonstrate ADL Tasks, 2-Verbalize Understanding, 3-

ImproveStrength/Nona


1=Demonstrate adherence to instructed precautions during ADL tasks.


2=Patient will verbalize/demonstrate understanding of assistive devices/modific

ations for ADL.


3=Patient will improve strength/tolerance for activity to enable patient to 

perform ADL's.





OT Education/Plan


Problem List/Assessment


Assessment:  Decreased Activ Tolerance, Decreased UE Strength, Dependent 

Transfers, Impaired Bed Mobility, Impaired Cognition, Impaired Coordination, 

Impaired Funct Balance, Impaired I ADL's, Impaired Self-Care Skills, Restricted 

Funct UE ROM, Visual-Perceptual Deficit


Visual perceptual issues difficult to pinpoint due to severe global aphasia.





Discharge Recommendations


Plan/Recommendations:  Continue POC


Therapy Discharge Recommendati:  24 Hour Supervision, Post Acute OT





Treatment Plan/Plan of Care


Treatment,Training & Education:  Yes


Patient would benefit from OT for education, treatment and training to promote 

independence in ADL's, mobility, safety and/or upper extremity function for 

ADL's.


Plan of Care:  ADL Retraining, Functional Mobility, Group Exercise/Act as Ind, 

UE Funct Exercise/Act


Treatment Duration:  Dec 10, 2020


Frequency:  At least 5 of 7 days/Wk (IRF)


Estimated Hrs Per Day:  1.5 hours per day


Agreement:  Yes


Rehab Potential:  Fair





Time/GCodes


Start Time:  08:30


Stop Time:  10:15


Total Time Billed (hr/min):  75


Billed Treatment Time


3979-6056 1, ADL x 30minutes


6006-0095 1, ADL x 45minutes











ROSANNA LOVING OT           2020 13:29

## 2020-11-18 NOTE — SPEECH THERAPY DAILY NOTE
Speech Daily Progress Note


Subjective


Date Seen by Provider:  Nov 18, 2020


Time Seen by Provider:  00:30


Patient was resting in her bed when I entered her room, however she alerted and 

participated well.





Objective


Patient completed OME with 80% given moderate cues to follow directions. 

Patient's repeated a series of words as directed with 75% accuracy. Patient 

utilizes safe oral intake strategies with sips/bites with minimal verbal and/or 

visual cues.





Assessment


Assessment Current Status:  Good Progress





Treatment Plan


Continue Plan of Care





Speech Short Term Goals


Short Term Goals


Short Term Goals


1) The patient will complete cognitive tasks related to memory, safety awareness

and problem solving at 80% with minimal cues.


2) The patient will complete speech tasks  to improve intelligibility at 80% 

with minimal cues.


3) The patient will complete confrontational naming tasks at 90% with minimal 

cues.


4) The patient will complete OME for improved oral status for safe oral intake 

at 80% or greater with minimal cues.





Speech Long Term Goals


Long Term Goals


Patient will improve communication abilities and safe oral intake in order to 

return to prior level.





Speech-Plan


Patient/Family Goals


Patient/Family Goals:  


Patient's discharge plans are unknown at this time, although it is


presumed she plans on returning home.





Treatment Plan


Speech Therapy Treatment Plan:  Continue Plan of Care


Treatment Duration:  Nov 25, 2020


Frequency:  4 times per week (Patient will receive ST 4-5x per week)


Estimated Hrs Per Day:  .5 hour per day


Rehab Potential:  Fair


Barriers to Learning:  


Recent CVA, age


Pt/Family Agrees to Plan:  Yes





Safety Risks/Education


Teaching Recipient:  Patient


Teaching Methods:  Demonstration, Discussion


Response to Teaching:  Verbalize Understanding, Return Demonstration


Education Topics Provided:  


Continued safety, safe oral intake





Time


Speech Therapy Time In:  09:00


Speech Therapy Time Out:  09:30


Total Billed Time:  30


Billed Treatment Time


1ROMIE SLTS No WH[place]ON, BETHANIA ST            Nov 18, 2020 10:33

## 2020-11-18 NOTE — PHYSICAL THERAPY DAILY NOTE
PT Daily Note-Current


Subjective


"ok baby" Pt.pleasant and gives full effort but needs many verbal and tactile 

cues.





Pain





   Location:  No Pain Reported





Appearance


appears dyspneic at times but O2 sats consistently >90%





Mental Status


Patient Orientation:  Non-Verbal/Aphasic


Attachments:  Other-See Comments (mask)





Transfers


SCALE: Activities may be completed with or without assistive devices.





6-Indepedent-patient completes the activity by him/herself with no assistance 

from a helper.


5-Set-up or Clean-up Assistance-helper sets up or cleans up; patient completes 

activity. [place] assists only prior to or  


    following the activity.


4-Supervision or Touching Assistance-helper provides verbal cues and/or 

touching/steadying and/or contact guard assistance as patient completes 

activity. Assistance may be provided   


    throughout the activity or intermittently.


3-Partial/Moderate Assistance-helper does LESS THAN HALF the effort. [place] 

lifts, holds or supports trunk or limbs, but provides less than half the effort.


2-Substantial/Maximal Assistance-helper does MORE THAN HALF the effort. [place] 

lifts or holds trunk or limbs and provides more than half the effort.


4-Tqchhbmmb-vlumbe does ALL the effort. Patient does none of the effort to 

complete the activity. Or, the assistance of 2 or more helpers is required for 

the patient to complete the  


    activity.


If activity was not attempted, code reason:


7-Patient Refused.


9-Not Applicable-not attempted and the patient did not perform the activity 

before the current illness, exacerbation or injury.


10-Not Attempted due to Environmental Limitations-(lack of equipment, weather 

restraints, etc.).


88-Not Attempted due to Medical Conditions or Safety Concerns.


Roll Left & Right (QC):  4


Sit to Lying (QC):  4


Lying to Sitting/Side of Bed(Q:  4


Sit to Stand (QC):  4


Chair/Bed-to-Chair Xfer(QC):  4


requires positioning assist and support and positioning of RUE





Weight Bearing


Right Lower Extremity:  Right


Full Weight Bearing


Left Lower Extremity:  Left


Full Weight Bearing





Gait Training


Does the Patient Walk?:  Yes


Walk 10 feet (QC):  4


Gait Persons Needed:  1


Gait Assistive Device:  Handheld Assist (and valente rail on left)


gait at rail in hallway, HHA right and w/c to follow 15 ft, pt.able to advance 

RLE indep but slowly, managed wt shift well, pt. happy with this





Wheelchair Training


Does the Pt Use a Wheelchair?:  Yes


Wheel 50 ft with 2 turns (QC):  3


Type of Wheelchair:  Manual





Exercises


Supine Ex:  Bridging, Ankle pumps, Quad Set, Rolling, Heel Slides, Short Arc 

Quads, Scooting, Straight leg raise, Hip abd/add


Supine Reps:  20 (assisted)





Assessment


Current Status:  Good Progress





PT Short Term Goals


Short Term Goals


Time Frame:  2020


Roll Left & Right:  4


Sit to lyin


Lying to sitting on side of be:  3


Chair/bed-to-chair transfer:  3


Walk 10 feet:  3


Does pt use a wc or scooter:  Yes


Wheel 50ft w/2 turns:  3


Wheel 150 feet:  3





PT Long Term Goals


Long Term Goals


PT Long Term Goals Time Frame:  Dec 3, 2020


Roll Left & Right (QC):  6


Sit to Lying (QC):  6


Lying-Sitting on Side/Bed(QC):  6


Sit to Stand (QC):  4


Chair/Bed-to-Chair Xfer(QC):  4


Toilet Transfer (QC):  4


Car Transfer (QC):  4


Does the Patient Walk:  Yes


Walk 10 feet (QC):  3


Walk 50ft with 2 Turns (QC):  3


Walk 150 ft (QC):  88


Walking 10ft on Uneven Surface:  3


1 Step (curb) (QC):  3


4 Steps (QC):  88


12 Steps (QC):  88


Picking up an Object (QC):  88


Does the Pt use WC or Scooter?:  Yes


Wheel 50 feet with 2 turns (QC:  6


Wheel 150 feet:  6





PT Plan


Treatment/Plan


Treatment Plan:  Continue Plan of Care


Treatment Plan:  Bed Mobility, Education, Functional Activity Nona, Functional 

Strength, Group Therapy, Gait, Safety, Therapeutic Exercise, Transfers


Treatment Duration:  2020


Frequency:  At least 5 of 7 days/Wk (IRF)


Estimated Hrs Per Day:  1.5 hours per day


Patient and/or Family Agrees t:  Yes





Safety Risks/Education


Patient Education:  Gait Training, Transfer Techniques, Correct Positioning, 

Disease Process, Safety Issues


Teaching Recipient:  Patient


Teaching Methods:  Demonstration, Discussion


Response to Teaching:  Verbalize Understanding, Return Demonstration, 

Reinforcement Needed





Time/GCodes


Time In:  1200


Time Out:  1315


Total Billed Treatment Time:  75


Total Billed Treatment


1,EX30m,FA30m,GT15m











ARRON WHATLEY PTA             2020 12:13

## 2020-11-18 NOTE — PM&R PROGRESS NOTE
Subjective


HPI/CC On Admission


Date Seen by Provider:  Nov 18, 2020


Time Seen by Provider:  09:30


Subjective/Events-last exam


11/18/20:


Re inserted the dumont since urinary retention continued


Increasing oral fluids


Urecholine was increased dose wise


Bowels are moving








11/17/20:


Xanax given last night 


Bowels moved yesterday 


Refusing to eat supper or breakfast today 


Agitated a bit today 


Post-void residual had 238


Hadnt voided since discontinued the cath yesterday 








11/16/20:


Dr. Tawil will discontinue the catheter and evaluate voiding trial today 


Flomax and Urecholine maintained 


Bowels are moving 


Very difficult to recover from catastrophic stroke








11/15/20:


Patient participating with therapy


No pain reported


Dr Tawil consulted for dumont catheter








11/14/20:


Patient denies pain


Working well with structured therapies


Dumont cath still in place will need to DC soon


Working on BM regimen








Patient doing well


Settling in well


No pain reported


Working with PT OT and ST


Eating a bit more without aspiration


No falls


Checked meds and labs


Conferred with RN


Reviewed therapy notes





Review of Systems


General:  Fatigue, Malaise


Neurological:  Weakness, Incoordination, Change in speech, Confusion





Objective


Exam


Vital Signs





Vital Signs








  Date Time  Temp Pulse Resp B/P (MAP) Pulse Ox O2 Delivery O2 Flow Rate FiO2


 


11/18/20 20:56     96 Room Air  


 


11/18/20 17:38 36.9 86 17 138/62 (87)    


 


11/14/20 07:57       2.00 





Capillary Refill : Less Than 3 Seconds


General Appearance:  No Apparent Distress, Anxious, Chronically ill, Thin


HEENT:  PERRL/EOMI, Normal ENT Inspection, Pharynx Normal


Neck:  Full Range of Motion, Normal Inspection, Non Tender, Supple, Carotid 

Bruit


Respiratory:  Chest Non Tender, Lungs Clear, No Accessory Muscle Use, No Res

piratory Distress, Decreased Breath Sounds


Cardiovascular:  Regular Rate, Rhythm, No Edema, No Gallop, No JVD, No Murmur, 

Normal Peripheral Pulses


Gastrointestinal:  Normal Bowel Sounds, No Organomegaly, No Pulsatile Mass, Non 

Tender, Soft


Back:  Normal Inspection, No CVA Tenderness, No Vertebral Tenderness


Extremity:  Normal Capillary Refill, Normal Inspection, Normal Range of Motion 

(except right sided weakness), Non Tender, No Calf Tenderness, No Pedal Edema


Neurologic/Psychiatric:  Alert, No Motor/Sensory Deficits, Normal Mood/Affect, 

Abnormal CNs II-XII, Aphasia, Facial Droop, Motor Weakness (right sided 

flaccidity)


Skin:  Normal Color, Warm/Dry


Lymphatic:  No Adenopathy





Results/Procedures


Lab


Patient resulted labs reviewed.





FIM


Transfers


Therapy Code Descriptions/Definitions 





Functional [place] Measure:


0=Not Assessed/NA        4=Minimal Assistance


1=Total Assistance        5=Supervision or Setup


2=Maximal Assistance  6=Modified [place]


3=Moderate Assistance 7=Complete IndependenceSCALE: Activities may be completed 

with or without assistive devices.





6-Indepedent-patient completes the activity by him/herself with no assistance 

from a helper.


5-Set-up or Clean-up Assistance-helper sets up or cleans up; patient completes 

activity. [place] assists only prior to or  


    following the activity.


4-Supervision or Touching Assistance-helper provides verbal cues and/or 

touching/steadying and/or contact guard assistance as patient completes 

activity. Assistance may be provided   


    throughout the activity or intermittently.


3-Partial/Moderate Assistance-helper does LESS THAN HALF the effort. [place] 

lifts, holds or supports trunk or limbs, but provides less than half the effort.


2-Substantial/Maximal Assistance-helper does MORE THAN HALF the effort. [place] 

lifts or holds trunk or limbs and provides more than half the effort.


3-Nbydoyrfy-vzupnk does ALL the effort. Patient does none of the effort to 

complete the activity. Or, the assistance of 2 or more helpers is required for 

the patient to complete the  


    activity.


If activity was not attempted, code reason:


7-Patient Refused.


9-Not Applicable-not attempted and the patient did not perform the activity 

before the current illness, exacerbation or injury.


10-Not Attempted due to Environmental Limitations-(lack of equipment, weather 

restraints, etc.).


88-Not Attempted due to Medical Conditions or Safety Concerns.


Roll Left to Right (QC):  4


Sit to Lying (QC):  4


Sit to Stand (QC):  3


Chair/Bed-to-Chair Xfer(QC):  3


Car Transfer (QC):  3





Gait Training


Walk 10 feet (QC):  3


Walk 50 ft with 2 Turns(QC):  88


Walk 150 ft (QC):  88


Walking 10ft/uneven surface-QC:  88


Gait Persons Needed:  2


Gait Assistive Device:  Walker Platform





Wheelchair Training


Distance:  150'x3


Wheel 50 ft with 2 turns (QC):  2


Wheel 150 ft (QC):  2





Stair Training


1 Step (curb) (QC):  88


4 Steps (QC):  88


12 Steps (QC):  88





Balance


Picking up an Object (QC):  88





ADL-Treatment


Eating (QC):  2


Oral Hygiene (QC):  3 (Min assist overall to brush teeth.  Pt. is handed 

toothbrush with paste already on it.  OT directs it to her mouth and she 

brushes.  OT holds glass to her mouth so she can take drink to rinse.  OT wipes 

off mouth.)


Shower/Bathe Self (QC):  7 (OT asks pt. if she would like to shower.  She 

states, "nah.")


Upper Body Dressing (QC):  2 (Pt. attempts to doff her t-shirt but becomes very 

frustrated when she can't grasp it, or sequence how to do it.  OT assists and 

then assists with donning shirt.  Pt able to pull down over torso.)


Lower Body Dressing (QC):  2 (Pt. requires max assist with donning brief and 

pants.)


On/Off Footwear (QC):  1 (Dependent with slipper socks.)


Toileting Hygiene (QC):  1 (Pt. slightly incontinent of bowel.  Pt. sits on [facility] 

with pillows for positioning, and is able to have more BM, but unable to 

urinate.  Requires max assist for one person to stand with her and someone else 

to cleanse her and pull pants up.)


Toilet Transfer (QC):  3 (Mod assist to transfer to [facility] with therapist in front 

of her.)





Assessment/Plan


Assessment and Plan


Assess & Plan/Chief Complaint


Assessment per Luke Bainbridge, MSIII:


Critical illness myopathy


S/P CVA involving left occipital artery


S/P 3 vessel CABG


Debility


Expressive aphasia


Dysarthria


Right sided weakness


Diabetes mellitus


COPD


CAD


Dysphagia requiring PEG


Dumont cath in place





Plan:


1. Ensure adequate anticoagulation


2. PT, OT, and SLP eval's and treat


3. Oxygen via NC- wean as tolerated


4. Resume Home medications


5. Fall precautions


6. Dietary consult


7. DVT prophylaxis





11/13/20:


Patient participating well


No pain reported


Continue current meds


Advance diet





11/14/20:


Jamar SANDERS soon


Continue home meds


Therapies to continue protocol





11/15/20:


Dr Tawil appreciated


Monitor BP


Monitor labs





11/16/20:


Bladder meds


Appreciate Dr Tawil


PT OT ST





11/17/20:


Voiding trial


Monitor closely


Variable moods





11/18/20:


Monitor BP


Encourage PO intake


Dumont cath management





(1) CVA (cerebral vascular accident)


(2) Dysarthria


(3) Dysphagia


(4) PEG (percutaneous endoscopic gastrostomy) status


(5) Expressive aphasia


(6) Smoker


(7) COPD (chronic obstructive pulmonary disease)


(8) CAD (coronary artery disease)


(9) Hx of CABG


(10) Dumont catheter in place


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











ANDREY BAH DO                Nov 18, 2020 11:46

## 2020-11-19 NOTE — PROGRESS NOTE - UROLOGY
Progress Note-Urology


Progress Notes/Assess & Plan


Progress/Assessment & Plan


TOV TODAY


Final Diagnosis


URINE RETENTION











TAWIL,ELIAS A MD               Nov 19, 2020 13:09

## 2020-11-19 NOTE — NUR
CM/SS PATIENT CARE CONFERENCE

Discussed Summary with both children, Abad Williamson and Monik Chahal.  They understood that 
the team consensus is patient will need caregiver support; family education/training session 
scheduled for Tuesday, November 24 at 0900.  Updated house supervisor, PT Team 
Leader/Fely, and ARU ALEXIS/Jasmin and CL/April.  Abad and Monik will be permitted in for 
this timed session during this Covid no visitor protocol.  



Monik will be bringing a POA and DPOA for patient review and signature, hospital notary to 
be available as necessary.  



Barriers to be discuss after family session, most likely financial stressors for out of 
pocket private pay caregivers.  [place] indicates [facility] initiated an [place] Medicaid 
application; however, they never did provide proof of income so the application likely did 
not move forward.  Will explore and advise family about moving forward on a [place] application 
since patient will become a resident of [place] at [facility] and will no longer reside in 
[place].

-------------------------------------------------------------------------------

Addendum: 11/20/20 at 1359 by SOWMYA DAVALOS 

-------------------------------------------------------------------------------

Team will facilitate a FaceTime visit between patient and her son Abad after her last 
therapy session this afternoon.  



Writer emailed a copy of the Caregiver Resources on file so that family can begin to explore 
cost and how to navigate scheduling.

## 2020-11-19 NOTE — SPEECH THERAPY DAILY NOTE
Speech Daily Progress Note


Subjective


Date Seen by Provider:  Nov 19, 2020


Time Seen by Provider:  00:30


Patient was resting in her bed following her PT session. Patient was more alert 

this date.





Objective


Patient completed a series of confrontational naming tasks of pictures of 

everyday objects with 60% given moderate visual/verbal cues. Patient demo safety

of small bites/sips with oral intake at 80%.





Assessment


Assessment Current Status:  Good Progress





Treatment Plan


Continue Plan of Care





Speech Short Term Goals


Short Term Goals


Short Term Goals


1) The patient will complete cognitive tasks related to memory, safety awareness

and problem solving at 80% with minimal cues.


2) The patient will complete speech tasks  to improve intelligibility at 80% 

with minimal cues.


3) The patient will complete confrontational naming tasks at 90% with minimal 

cues.


4) The patient will complete OME for improved oral status for safe oral intake 

at 80% or greater with minimal cues.





Speech Long Term Goals


Long Term Goals


Patient will improve communication abilities and safe oral intake in order to 

return to prior level.





Speech-Plan


Patient/Family Goals


Patient/Family Goals:  


Patient plans on returning to her home with family upon discharge.





Treatment Plan


Speech Therapy Treatment Plan:  Continue Plan of Care


Treatment Duration:  Nov 25, 2020


Frequency:  4 times per week (Patient will receive ST 4-5x per week)


Estimated Hrs Per Day:  .5 hour per day


Rehab Potential:  Fair


Barriers to Learning:  


Patient's recent CVA, age


Pt/Family Agrees to Plan:  Yes





Safety Risks/Education


Teaching Recipient:  Patient


Teaching Methods:  Demonstration, Discussion


Response to Teaching:  Verbalize Understanding, Return Demonstration


Education Topics Provided:  


Utilization of the call light as needed, continued safety of oral intake





Time


Speech Therapy Time In:  09:00


Speech Therapy Time Out:  09:30


Total Billed Time:  30


Billed Treatment Time


1, SLDALJIT, DYST


FRANCHESKA Lau            Nov 19, 2020 09:22

## 2020-11-19 NOTE — PM&R PROGRESS NOTE
Subjective


HPI/CC On Admission


Date Seen by Provider:  Nov 19, 2020


Time Seen by Provider:  12:30


Subjective/Events-last exam


11/19/20:


Pt has varying participation with PT and OT 


Accu cheks changed to BID 


Will monitor pt closely 


Denies any pain 


Is a feeder








11/18/20:


Re inserted the dumont since urinary retention continued


Increasing oral fluids


Urecholine was increased dose wise


Bowels are moving








11/17/20:


Xanax given last night 


Bowels moved yesterday 


Refusing to eat supper or breakfast today 


Agitated a bit today 


Post-void residual had 238


Hadnt voided since discontinued the cath yesterday 








11/16/20:


Dr. Tawil will discontinue the catheter and evaluate voiding trial today 


Flomax and Urecholine maintained 


Bowels are moving 


Very difficult to recover from catastrophic stroke








11/15/20:


Patient participating with therapy


No pain reported


Dr Tawil consulted for dumont catheter








11/14/20:


Patient denies pain


Working well with structured therapies


Dumont cath still in place will need to DC soon


Working on BM regimen








Patient doing well


Settling in well


No pain reported


Working with PT OT and ST


Eating a bit more without aspiration


No falls


Checked meds and labs


Conferred with RN


Reviewed therapy notes





Review of Systems


General:  Fatigue, Malaise


Neurological:  Weakness, Incoordination, Change in speech, Confusion





Objective


Exam


Vital Signs





Vital Signs








  Date Time  Temp Pulse Resp B/P (MAP) Pulse Ox O2 Delivery O2 Flow Rate FiO2


 


11/19/20 20:45     97 Room Air  


 


11/19/20 17:09 36.6 79 20 140/64 (89)    


 


11/14/20 07:57       2.00 





Capillary Refill : Less Than 3 Seconds


General Appearance:  No Apparent Distress, Anxious, Chronically ill, Thin


HEENT:  PERRL/EOMI, Normal ENT Inspection, Pharynx Normal


Neck:  Full Range of Motion, Normal Inspection, Non Tender, Supple, Carotid 

Bruit


Respiratory:  Chest Non Tender, Lungs Clear, No Accessory Muscle Use, No 

Respiratory Distress, Decreased Breath Sounds


Cardiovascular:  Regular Rate, Rhythm, No Edema, No Gallop, No JVD, No Murmur, 

Normal Peripheral Pulses


Gastrointestinal:  Normal Bowel Sounds, No Organomegaly, No Pulsatile Mass, Non 

Tender, Soft


Back:  Normal Inspection, No CVA Tenderness, No Vertebral Tenderness


Extremity:  Normal Capillary Refill, Normal Inspection, Normal Range of Motion 

(except right sided weakness), Non Tender, No Calf Tenderness, No Pedal Edema


Neurologic/Psychiatric:  Alert, No Motor/Sensory Deficits, Normal Mood/Affect, 

Abnormal CNs II-XII, Aphasia, Facial Droop, Motor Weakness (right sided 

flaccidity)


Skin:  Normal Color, Warm/Dry


Lymphatic:  No Adenopathy





Results/Procedures


Lab


Patient resulted labs reviewed.





FIM


Transfers


Therapy Code Descriptions/Definitions 





Functional [place] Measure:


0=Not Assessed/NA        4=Minimal Assistance


1=Total Assistance        5=Supervision or Setup


2=Maximal Assistance  6=Modified [place]


3=Moderate Assistance 7=Complete IndependenceSCALE: Activities may be completed 

with or without assistive devices.





6-Indepedent-patient completes the activity by him/herself with no assistance 

from a helper.


5-Set-up or Clean-up Assistance-helper sets up or cleans up; patient completes 

activity. [place] assists only prior to or  


    following the activity.


4-Supervision or Touching Assistance-helper provides verbal cues and/or 

touching/steadying and/or contact guard assistance as patient completes 

activity. Assistance may be provided   


    throughout the activity or intermittently.


3-Partial/Moderate Assistance-helper does LESS THAN HALF the effort. [place] 

lifts, holds or supports trunk or limbs, but provides less than half the effort.


2-Substantial/Maximal Assistance-helper does MORE THAN HALF the effort. [place] 

lifts or holds trunk or limbs and provides more than half the effort.


4-Blpipnbgf-lgzwuv does ALL the effort. Patient does none of the effort to 

complete the activity. Or, the assistance of 2 or more helpers is required for 

the patient to complete the  


    activity.


If activity was not attempted, code reason:


7-Patient Refused.


9-Not Applicable-not attempted and the patient did not perform the activity 

before the current illness, exacerbation or injury.


10-Not Attempted due to Environmental Limitations-(lack of equipment, weather 

restraints, etc.).


88-Not Attempted due to Medical Conditions or Safety Concerns.


Roll Left to Right (QC):  4


Sit to Lying (QC):  4


Sit to Stand (QC):  4


Chair/Bed-to-Chair Xfer(QC):  4


Car Transfer (QC):  3





Gait Training


Does the Patient Walk?:  Yes


Walk 10 feet (QC):  4


Walk 50 ft with 2 Turns(QC):  88


Walk 150 ft (QC):  88


Walking 10ft/uneven surface-QC:  88


Gait Persons Needed:  1


Gait Assistive Device:  Handheld Assist (and valente rail on left)





Wheelchair Training


Does the Pt Use a Wheelchair?:  Yes


Distance:  150'x3


Wheel 50 ft with 2 turns (QC):  3


Wheel 150 ft (QC):  2


Type of Wheelchair:  Manual





Stair Training


1 Step (curb) (QC):  88


4 Steps (QC):  88


12 Steps (QC):  88





Balance


Picking up an Object (QC):  88





ADL-Treatment


Eating (QC):  2 (Max assist to initiate holding spoon, and then able to only 

hold briefly without OT putting it back in hand.  [place] assist to bring spoon to 

mouth.  Pt. unable to hold the dish in other hand.  Pt. used left hand to feed.)


Oral Hygiene (QC):  3 (Min assist overall to brush teeth.  Pt. is handed to

othbrush with paste already on it.  OT directs it to her mouth and she brushes. 

OT holds glass to her mouth so she can take drink to rinse.  OT wipes off 

mouth.)


Shower/Bathe Self (QC):  2 (Pt. has difficulty initiating any bathing task.  She

is handed washcloth and shown where to wash.  She might "dab" at that spot, but 

then requires assistance to wash it thoroughly.  OT washed all parts.)


Upper Body Dressing (QC):  2 (Pt. is unable to thread arms or head, but is able 

to assist with pulling shirt down over torso.)


Lower Body Dressing (QC):  1 (Max assist to don brief and pants.  Assist of one 

person to stand while another person pulls them over hips.)


On/Off Footwear (QC):  1


Toileting Hygiene (QC):  1 (Pt. incontinent of bowel in shower.)


Toilet Transfer (QC):  3 (Mod assist to transfer to [facility] [place] with therapist in front 

of her.)





Assessment/Plan


Assessment and Plan


Assess & Plan/Chief Complaint


Assessment per Luke Bainbridge, MSIII:


Critical illness myopathy


S/P CVA involving left occipital artery


S/P 3 vessel CABG


Debility


Expressive aphasia


Dysarthria


Right sided weakness


Diabetes mellitus


COPD


CAD


Dysphagia requiring PEG


Dumont cath in place





Plan:


1. Ensure adequate anticoagulation


2. PT, OT, and SLP eval's and treat


3. Oxygen via NC- wean as tolerated


4. Resume Home medications


5. Fall precautions


6. Dietary consult


7. DVT prophylaxis





11/13/20:


Patient participating well


No pain reported


Continue current meds


Advance diet





11/14/20:


Dumont DC soon


Continue home meds


Therapies to continue protocol





11/15/20:


Dr Tawil appreciated


Monitor BP


Monitor labs





11/16/20:


Bladder meds


Appreciate Dr Tawil


PT OT ST





11/17/20:


Voiding trial


Monitor closely


Variable moods





11/18/20:


Monitor BP


Encourage PO intake


Dumont cath management





11/19/20:


Encourage participation with therapies


Monitor dumont cath





(1) CVA (cerebral vascular accident)


(2) Dysarthria


(3) Dysphagia


(4) PEG (percutaneous endoscopic gastrostomy) status


(5) Expressive aphasia


(6) Smoker


(7) COPD (chronic obstructive pulmonary disease)


(8) CAD (coronary artery disease)


(9) Hx of CABG


(10) Dumont catheter in place


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











ANDREY BHA DO                Nov 19, 2020 10:47

## 2020-11-19 NOTE — PHYSICAL THERAPY DAILY NOTE
PT Daily Note-Current


Subjective


Pt laying Supine in bed upon arrival.  Pt agrees to PT.





Mental Status


Patient Orientation:  Person, [place]


Attachments:  Roldan Catheter





Transfers


SCALE: Activities may be completed with or without assistive devices.





6-Indepedent-patient completes the activity by him/herself with no assistance 

from a helper.


5-Set-up or Clean-up Assistance-helper sets up or cleans up; patient completes 

activity. [place] assists only prior to or  


    following the activity.


4-Supervision or Touching Assistance-helper provides verbal cues and/or 

touching/steadying and/or contact guard assistance as patient completes 

activity. Assistance may be provided   


    throughout the activity or intermittently.


3-Partial/Moderate Assistance-helper does LESS THAN HALF the effort. [place] 

lifts, holds or supports trunk or limbs, but provides less than half the effort.


2-Substantial/Maximal Assistance-helper does MORE THAN HALF the effort. [place] 

lifts or holds trunk or limbs and provides more than half the effort.


3-Rejksbcqt-kufgsw does ALL the effort. Patient does none of the effort to 

complete the activity. Or, the assistance of 2 or more helpers is required for 

the patient to complete the  


    activity.


If activity was not attempted, code reason:


7-Patient Refused.


9-Not Applicable-not attempted and the patient did not perform the activity 

before the current illness, exacerbation or injury.


10-Not Attempted due to Environmental Limitations-(lack of equipment, weather 

restraints, etc.).


88-Not Attempted due to Medical Conditions or Safety Concerns.





Weight Bearing


Right Lower Extremity:  Right


Full Weight Bearing


Left Lower Extremity:  Left


Full Weight Bearing





Wheelchair Training


Does the Pt Use a Wheelchair?:  No





Exercises


Supine Ex:  Ankle pumps, Quad Set, Glut sets, Heel Slides, Straight leg raise, 

Hip abd/add


Supine Reps:  15 (2 sets)





Treatments


Completes 2 sets Supine Ex.  Pt gets anxious when discussion of TF to recliner. 

PTA gives instruction on Pursed lip breathing to aid in anxiety.  Pt resting and

Nurse gives meds as tx ends.  All needs met, call light in hand.





Assessment


Current Status:  Fair Progress


Pt is very fatigued and anxious during tx.





PT Short Term Goals


Short Term Goals


Time Frame:  2020


Roll Left & Right:  4


Sit to lyin


Lying to sitting on side of be:  3


Chair/bed-to-chair transfer:  3


Walk 10 feet:  3


Does pt use a wc or scooter:  Yes


Wheel 50ft w/2 turns:  3


Wheel 150 feet:  3





PT Long Term Goals


Long Term Goals


PT Long Term Goals Time Frame:  Dec 3, 2020


Roll Left & Right (QC):  6


Sit to Lying (QC):  6


Lying-Sitting on Side/Bed(QC):  6


Sit to Stand (QC):  4


Chair/Bed-to-Chair Xfer(QC):  4


Toilet Transfer (QC):  4


Car Transfer (QC):  4


Does the Patient Walk:  Yes


Walk 10 feet (QC):  3


Walk 50ft with 2 Turns (QC):  3


Walk 150 ft (QC):  88


Walking 10ft on Uneven Surface:  3


1 Step (curb) (QC):  3


4 Steps (QC):  88


12 Steps (QC):  88


Picking up an Object (QC):  88


Does the Pt use WC or Scooter?:  Yes


Wheel 50 feet with 2 turns (QC:  6


Wheel 150 feet:  6





PT Plan


Problem List


Problem List:  Activity Tolerance, Functional Strength





Treatment/Plan


Treatment Plan:  Continue Plan of Care


Treatment Plan:  Bed Mobility, Education, Functional Activity Nona, Functional 

Strength, Group Therapy, Gait, Safety, Therapeutic Exercise, Transfers


Treatment Duration:  2020


Frequency:  At least 5 of 7 days/Wk (IRF)


Estimated Hrs Per Day:  1.5 hours per day


Patient and/or Family Agrees t:  Yes





Safety Risks/Education


Patient Education:  Transfer Techniques, Correct Positioning, Safety Issues


Teaching Recipient:  Patient


Response to Teaching:  Reinforcement Needed





Time/GCodes


Time In:  800


Time Out:  900


Total Billed Treatment Time:  60


Total Billed Treatment


1, EX x2 (35m) & FA x2 (25m)











PATEL WELLS PTA              2020 09:05

## 2020-11-19 NOTE — OCCUPATIONAL THER DAILY NOTE
OT Current Status-Daily Note


Subjective


No pain reported.





Mental Status/Objective


Patient Orientation:  Unable to Assess


Attachments:  Roldan Catheter





ADL-Treatment


Therapy Code Descriptions/Definitions 





Functional [place] Measure:


0=Not Assessed/NA        4=Minimal Assistance


1=Total Assistance        5=Supervision or Setup


2=Maximal Assistance  6=Modified [place]


3=Moderate Assistance 7=Complete IndependenceSCALE: Activities may be completed 

with or without assistive devices.





6-Indepedent-patient completes the activity by him/herself with no assistance 

from a helper.


5-Set-up or Clean-up Assistance-helper sets up or cleans up; patient completes 

activity. [place] assists only prior to or  


    following the activity.


4-Supervision or Touching Assistance-helper provides verbal cues and/or 

touching/steadying and/or contact guard assistance as patient completes 

activity. Assistance may be provided   


    throughout the activity or intermittently.


3-Partial/Moderate Assistance-helper does LESS THAN HALF the effort. [place] 

lifts, holds or supports trunk or limbs, but provides less than half the effort.


2-Substantial/Maximal Assistance-helper does MORE THAN HALF the effort. [place] 

lifts or holds trunk or limbs and provides more than half the effort.


3-Kvujvscch-htrsnu does ALL the effort. Patient does none of the effort to compl

ete the activity. Or, the assistance of 2 or more helpers is required for the 

patient to complete the  


    activity.


If activity was not attempted, code reason:


7-Patient Refused.


9-Not Applicable-not attempted and the patient did not perform the activity 

before the current illness, exacerbation or injury.


10-Not Attempted due to Environmental Limitations-(lack of equipment, weather 

restraints, etc.).


88-Not Attempted due to Medical Conditions or Safety Concerns.


Oral Hygiene (QC):  4 (SBA and cues to brush teeth seated at sink.)


Upper Body Dressing (QC):  2 (Pt. attempts with cues to pull at shirt, but does 

not actually pull it.  OT pulls off over head and pt. is able to pull left arm 

out with max cues.  Max cues given to use left arm to pull off of right arm, and

pt. does not understand.  Dependent to don shirt.)





Other Treatment


Pt. in bed when OT enters this a.m.  She is able to acknowledge OT, but requires

cues to look at OT.  Keeps her head down and mumbles while attempting to talk.  

Words are unintelligible.  Pt. is able to follow cue of transferring supine-sit 

with SBA and increased time.  Transfers to wheelchair with mod assist and cues. 

Pt. dabs at hair with brush seated at sink, and so OT does this for her.  Max 

cues given to brush teeth.  Pt. seems SOA and agitated throughout.  BP is 110/55

and sats at 98% on room air.  Nurse aide takes blood sugar and it is okay.  Pt. 

taken to therapy gym.  OT provides series of activities to promote fine motor 

coordination and strength, following directions, processing steps.  Pt. has 

great difficulty following cues unless they are told to her multiple times.  She

is able to replicate simple 3 block structure with visual with max cues and 

multiple attempts.  She is able to pinch clothespin on bar after multiple 

attempts, visual cues, and then [place] assist.  OT puts right UE on arm skate to 

encourage gravity eliminated shoulder ROM.  Pt. is unable to follow or sequence 

attempting to move her arm on the skate, even after OT does this for her.  Skate

removed and items are placed on pt's right on the table.  Pt. is unable to see 

anything it seems on right side.  She is unable to understand or follow the 

cues, "look at the right side, look right here, look all over table."  Due to 

aphasia both receptively and expressively, pt. is having great difficulty 

understanding what to do, how to sequence, and how to complete a task.  Pt. 

taken back to room after session and transferred to reclining chair with mod 

assist.  All needs met and pt. has chair alarm on.





Education


OT Patient Education:  Correct positioning, Exercise program, Modified ADL 

techniques, Progress toward Goal/Update tx plan, Purpose of tx/functional 

activities, Reviewed precautions, Rehab process, Transfer techniques


Teaching Recipient:  Patient


Teaching Methods:  Demonstration, Discussion


Response to Teaching:  Unable to Return Demonstration, Unable to Comprehend





OT Short Term Goals


Short Term Goals


Time Frame:  2020


Eating:  3


Oral hygiene:  3


Toileting hygiene:  3


Shower/bathe self:  3


Upper body dressin


Lower body dressing:  3


Putting on/taking off footwear:  3





OT Long Term Goals


Long Term Goals


Time Frame:  Dec 10, 2020


Eating (QC):  4


Oral Hygiene (QC):  4


Toileting Hygiene (QC):  3


Shower/Bathe Self (QC):  3


Upper Body Dressing (QC):  5


Lower Body Dressing (QC):  4


On/Off Footwear (QC):  4


Additional Goals:  1-Demonstrate ADL Tasks, 2-Verbalize Understanding, 3-

ImproveStrength/[place]


1=Demonstrate adherence to instructed precautions during ADL tasks.


2=Patient will verbalize/demonstrate understanding of assistive 

devices/modifications for ADL.


3=Patient will improve strength/tolerance for activity to enable patient to 

perform ADL's.





OT Education/Plan


Problem List/Assessment


Assessment:  Decreased Activ Tolerance, Decreased Safety Aware, Decreased UE 

Strength, Dependent Transfers, Impaired Bed Mobility, Impaired Cognition, 

Impaired Coordination, Impaired Funct Balance, Impaired I ADL's, Impaired Self-

Care Skills, Restricted Funct UE ROM, Visual-Perceptual Deficit


Visual perceptual issues difficult to pinpoint due to severe global aphasia.





Discharge Recommendations


Plan/Recommendations:  Continue POC


Therapy Discharge Recommendati:  24 Hour Supervision, Post Acute OT





Treatment Plan/Plan of Care


Treatment,Training & Education:  Yes


Patient would benefit from OT for education, treatment and training to promote 

independence in ADL's, mobility, safety and/or upper extremity function for 

ADL's.


Plan of Care:  ADL Retraining, Functional Mobility, Group Exercise/Act as Ind, 

UE Funct Exercise/Act


Treatment Duration:  Dec 10, 2020


Frequency:  At least 5 of 7 days/Wk (IRF)


Estimated Hrs Per Day:  1.5 hours per day


Agreement:  Yes


Rehab Potential:  Fair





Time/GCodes


Start Time:  10:30


Stop Time:  11:45


Total Time Billed (hr/min):  75


Billed Treatment Time


1, ADL x 30minutes, FA x 45minutes











ROSANNA LOVING OT           2020 12:24

## 2020-11-20 NOTE — PHYSICAL THERAPY DAILY NOTE
PT Daily Note-Current


Subjective


Pt. in bed , expresses she is so tired, but agrees to exercise and TRFs





Pain





   Location:  No Pain Reported





Mental Status


Patient Orientation:  Non-Verbal/Aphasic





Transfers


SCALE: Activities may be completed with or without assistive devices.





6-Indepedent-patient completes the activity by him/herself with no assistance 

from a helper.


5-Set-up or Clean-up Assistance-helper sets up or cleans up; patient completes 

activity. [place] assists only prior to or  


    following the activity.


4-Supervision or Touching Assistance-helper provides verbal cues and/or 

touching/steadying and/or contact guard assistance as patient completes 

activity. Assistance may be provided   


    throughout the activity or intermittently.


3-Partial/Moderate Assistance-helper does LESS THAN HALF the effort. [place] 

lifts, holds or supports trunk or limbs, but provides less than half the effort.


2-Substantial/Maximal Assistance-helper does MORE THAN HALF the effort. [place] 

lifts or holds trunk or limbs and provides more than half the effort.


9-Teuadufvx-uwzmfh does ALL the effort. Patient does none of the effort to comp

lete the activity. Or, the assistance of 2 or more helpers is required for the 

patient to complete the  


    activity.


If activity was not attempted, code reason:


7-Patient Refused.


9-Not Applicable-not attempted and the patient did not perform the activity 

before the current illness, exacerbation or injury.


10-Not Attempted due to Environmental Limitations-(lack of equipment, weather 

restraints, etc.).


88-Not Attempted due to Medical Conditions or Safety Concerns.


rolling left to right and sup to sit to sup CGA to SBA





Weight Bearing


Right Lower Extremity:  Right


Full Weight Bearing


Left Lower Extremity:  Left


Full Weight Bearing





Exercises


Supine Ex:  Bridging, Ankle pumps, Quad Set, Rolling, Glut sets, Heel Slides, 

Short Arc Quads, Scooting (up in bed), Straight leg raise, Hip abd/add


Supine Reps:  15





Treatments


struggling with SLRs on right but did indep





Assessment


Current Status:  Good Progress


gives full effort





PT Short Term Goals


Short Term Goals


Time Frame:  2020


Roll Left & Right:  4


Sit to lyin


Lying to sitting on side of be:  3


Chair/bed-to-chair transfer:  3


Walk 10 feet:  3


Does pt use a wc or scooter:  Yes


Wheel 50ft w/2 turns:  3


Wheel 150 feet:  3





PT Long Term Goals


Long Term Goals


PT Long Term Goals Time Frame:  Dec 3, 2020


Roll Left & Right (QC):  6


Sit to Lying (QC):  6


Lying-Sitting on Side/Bed(QC):  6


Sit to Stand (QC):  4


Chair/Bed-to-Chair Xfer(QC):  4


Toilet Transfer (QC):  4


Car Transfer (QC):  4


Does the Patient Walk:  Yes


Walk 10 feet (QC):  3


Walk 50ft with 2 Turns (QC):  3


Walk 150 ft (QC):  88


Walking 10ft on Uneven Surface:  3


1 Step (curb) (QC):  3


4 Steps (QC):  88


12 Steps (QC):  88


Picking up an Object (QC):  88


Does the Pt use WC or Scooter?:  Yes


Wheel 50 feet with 2 turns (QC:  6


Wheel 150 feet:  6





PT Plan


Treatment/Plan


Treatment Plan:  Continue Plan of Care


Treatment Plan:  Bed Mobility, Education, Functional Activity Nona, Functional 

Strength, Group Therapy, Gait, Safety, Therapeutic Exercise, Transfers


Treatment Duration:  2020


Frequency:  At least 5 of 7 days/Wk (IRF)


Estimated Hrs Per Day:  1.5 hours per day


Patient and/or Family Agrees t:  Yes





Safety Risks/Education


Patient Education:  Transfer Techniques





Time/GCodes


Time In:  1410


Time Out:  1440


Total Billed Treatment Time:  30


Total Billed Treatment


1,EX30m











ARRON WHATLEY PTA             2020 14:39

## 2020-11-20 NOTE — PHYSICAL THERAPY DAILY NOTE
PT Daily Note-Current


Subjective


Pt. agrees to Rx , smiles and says"ok baby " repeatedly.





Pain





   Location:  No Pain Reported





Mental Status


Patient Orientation:  Non-Verbal/Aphasic


Attachments:  Other-See Comments (mask, gait belt)





Transfers


SCALE: Activities may be completed with or without assistive devices.





6-Indepedent-patient completes the activity by him/herself with no assistance 

from a helper.


5-Set-up or Clean-up Assistance-helper sets up or cleans up; patient completes 

activity. [place] assists only prior to or  


    following the activity.


4-Supervision or Touching Assistance-helper provides verbal cues and/or 

touching/steadying and/or contact guard assistance as patient completes 

activity. Assistance may be provided   


    throughout the activity or intermittently.


3-Partial/Moderate Assistance-helper does LESS THAN HALF the effort. [place] 

lifts, holds or supports trunk or limbs, but provides less than half the effort.


2-Substantial/Maximal Assistance-helper does MORE THAN HALF the effort. [place] 

lifts or holds trunk or limbs and provides more than half the effort.


9-Lbnlfuuub-bnudxh does ALL the effort. Patient does none of the effort to 

complete the activity. Or, the assistance of 2 or more helpers is required for 

the patient to complete the  


    activity.


If activity was not attempted, code reason:


7-Patient Refused.


9-Not Applicable-not attempted and the patient did not perform the activity 

before the current illness, exacerbation or injury.


10-Not Attempted due to Environmental Limitations-(lack of equipment, weather 

restraints, etc.).


88-Not Attempted due to Medical Conditions or Safety Concerns.


Roll Left & Right (QC):  4


Sit to Lying (QC):  4


Lying to Sitting/Side of Bed(Q:  4 (needs guided to roll and push efficiently)


Sit to Stand (QC):  4


Chair/Bed-to-Chair Xfer(QC):  4


TRFs SPT left and right much improved, better balance etc





Weight Bearing


Right Lower Extremity:  Right


Full Weight Bearing


Left Lower Extremity:  Left


Full Weight Bearing





Gait Training


Does the Patient Walk?:  Yes


Walk 10 feet (QC):  4


Gait Persons Needed:  1 (plus w/c to f/u)


Gait Assistive Device:  Handheld Assist (HH right and rail in valente left)


gait at rail 12 ft x 2, good wt shift and step length as well as advancing RLE





Wheelchair Training


Does the Pt Use a Wheelchair?:  Yes


Wheel 50 ft with 2 turns (QC):  3


Type of Wheelchair:  Manual


manages brake on R left side only





Exercises


Supine Ex:  Bridging, Ankle pumps, Quad Set, Heel Slides, Straight leg raise, 

Hip abd/add


Supine Reps:  12


NuStep Minutes:  8


 NuStep Workload:  2





Assessment


Current Status:  Good Progress





PT Short Term Goals


Short Term Goals


Time Frame:  2020


Roll Left & Right:  4


Sit to lyin


Lying to sitting on side of be:  3


Chair/bed-to-chair transfer:  3


Walk 10 feet:  3


Does pt use a wc or scooter:  Yes


Wheel 50ft w/2 turns:  3


Wheel 150 feet:  3





PT Long Term Goals


Long Term Goals


PT Long Term Goals Time Frame:  Dec 3, 2020


Roll Left & Right (QC):  6


Sit to Lying (QC):  6


Lying-Sitting on Side/Bed(QC):  6


Sit to Stand (QC):  4


Chair/Bed-to-Chair Xfer(QC):  4


Toilet Transfer (QC):  4


Car Transfer (QC):  4


Does the Patient Walk:  Yes


Walk 10 feet (QC):  3


Walk 50ft with 2 Turns (QC):  3


Walk 150 ft (QC):  88


Walking 10ft on Uneven Surface:  3


1 Step (curb) (QC):  3


4 Steps (QC):  88


12 Steps (QC):  88


Picking up an Object (QC):  88


Does the Pt use WC or Scooter?:  Yes


Wheel 50 feet with 2 turns (QC:  6


Wheel 150 feet:  6





PT Plan


Treatment/Plan


Treatment Plan:  Continue Plan of Care


Treatment Plan:  Bed Mobility, Education, Functional Activity Nona, Functional 

Strength, Group Therapy, Gait, Safety, Therapeutic Exercise, Transfers


Treatment Duration:  2020


Frequency:  At least 5 of 7 days/Wk (IRF)


Estimated Hrs Per Day:  1.5 hours per day


Patient and/or Family Agrees t:  Yes





Safety Risks/Education


Patient Education:  Gait Training, Transfer Techniques, Reviewed Use of Ice, W/C

Management, Disease Process, Safety Issues


Teaching Recipient:  Patient


Teaching Methods:  Demonstration, Discussion


Response to Teaching:  Return Demonstration, Reinforcement Needed





Time/GCodes


Time In:  1100


Time Out:  1200


Total Billed Treatment Time:  60


Total Billed Treatment


1,GT15m,EX20m,FA25m











ARRON WHATLEY PTA             2020 12:00

## 2020-11-20 NOTE — NUR
RD ASSESSMENT 



PMHx: CAD; DM; COPD; dysphagia; 



PT INTERACTION: Pt was awake and pleasant during nutrition follow-up. Pt states she has been 
eating well since last assessment. Note avg PO intake 46% x4d, per chart review. Note pt 
currently on CHO 60g/m 3snack diet, with modifier of DYS2 Mechanically Altered consistency, 
per chart review. Note pt also has a feeder to assist with eating. Pt states no issues with 
nausea, vomiting, constipation, or diarrhea since last assessment. Note last BM was 11/20 
and pt currently on bowel regimen of colace BID, senna BID, and miralax BID, per chart 
review.



ABNORMAL NUTRITION-RELATED LAB VALUES

LOW: 

HIGH: glu 147



Est. kcal needs: 3195-6003 kcal | 25-30 kcal/kg  

Est. Pro needs:  53-64 g Pro | 0.8-1.0 g Pro/kg 



PES STATEMENT: Inadequate oral intake (NI-2.1) related to loss of appetite as evidenced by 
pt interview, and avg PO intake 46% x4d. 



INTERVENTION:  

Continue with current diet order of CHO 60g/m 3snack diet, with modifier of DYS2 
Mechanically Altered consistency. 

Continue to have feeder assist pt with eating. 

Continue with current supplementation order of Glucerna (vary) with meals TID, for increased 
kcal intake. Provides 220 kcal and 10 g Pro per serving. 

Will continue to follow and reassess as pt needs, intake, and status change. 





DOREEN Carney, MS RD LD

## 2020-11-20 NOTE — PM&R PROGRESS NOTE
Subjective


HPI/CC On Admission


Date Seen by Provider:  Nov 20, 2020


Time Seen by Provider:  12:30


Subjective/Events-last exam


11/20/20:


Patient about the same


Requires a feeder to eat


No pain reported


Confusion is a limitation








11/19/20:


Pt has varying participation with PT and OT 


Accu cheks changed to BID 


Will monitor pt closely 


Denies any pain 


Is a feeder








11/18/20:


Re inserted the dumont since urinary retention continued


Increasing oral fluids


Urecholine was increased dose wise


Bowels are moving








11/17/20:


Xanax given last night 


Bowels moved yesterday 


Refusing to eat supper or breakfast today 


Agitated a bit today 


Post-void residual had 238


Hadnt voided since discontinued the cath yesterday 








11/16/20:


Dr. Tawil will discontinue the catheter and evaluate voiding trial today 


Flomax and Urecholine maintained 


Bowels are moving 


Very difficult to recover from catastrophic stroke








11/15/20:


Patient participating with therapy


No pain reported


Dr Tawil consulted for dumont catheter








11/14/20:


Patient denies pain


Working well with structured therapies


Dumont cath still in place will need to DC soon


Working on BM regimen








Patient doing well


Settling in well


No pain reported


Working with PT OT and ST


Eating a bit more without aspiration


No falls


Checked meds and labs


Conferred with RN


Reviewed therapy notes





Review of Systems


Neurological:  Weakness, Incoordination, Confusion





Objective


Exam


Vital Signs





Vital Signs








  Date Time  Temp Pulse Resp B/P (MAP) Pulse Ox O2 Delivery O2 Flow Rate FiO2


 


11/20/20 20:27     95 Room Air  


 


11/20/20 17:09 36.0 82 18 130/62 (84)    





Capillary Refill : Less Than 3 Seconds


General Appearance:  No Apparent Distress, Anxious, Chronically ill, Thin


HEENT:  PERRL/EOMI, Normal ENT Inspection, Pharynx Normal


Neck:  Full Range of Motion, Normal Inspection, Non Tender, Supple, Carotid 

Bruit


Respiratory:  Chest Non Tender, Lungs Clear, No Accessory Muscle Use, No 

Respiratory Distress, Decreased Breath Sounds


Cardiovascular:  Regular Rate, Rhythm, No Edema, No Gallop, No JVD, No Murmur, 

Normal Peripheral Pulses


Gastrointestinal:  Normal Bowel Sounds, No Organomegaly, No Pulsatile Mass, Non 

Tender, Soft


Back:  Normal Inspection, No CVA Tenderness, No Vertebral Tenderness


Extremity:  Normal Capillary Refill, Normal Inspection, Normal Range of Motion 

(except right sided weakness), Non Tender, No Calf Tenderness, No Pedal Edema


Neurologic/Psychiatric:  Alert, No Motor/Sensory Deficits, Normal Mood/Affect, 

Abnormal CNs II-XII, Aphasia, Facial Droop, Motor Weakness (right sided 

flaccidity)


Skin:  Normal Color, Warm/Dry


Lymphatic:  No Adenopathy





Results/Procedures


Lab


Patient resulted labs reviewed.





FIM


Transfers


Therapy Code Descriptions/Definitions 





Functional [place] Measure:


0=Not Assessed/NA        4=Minimal Assistance


1=Total Assistance        5=Supervision or Setup


2=Maximal Assistance  6=Modified [place]


3=Moderate Assistance 7=Complete IndependenceSCALE: Activities may be completed 

with or without assistive devices.





6-Indepedent-patient completes the activity by him/herself with no assistance 

from a helper.


5-Set-up or Clean-up Assistance-helper sets up or cleans up; patient completes 

activity. [place] assists only prior to or  


    following the activity.


4-Supervision or Touching Assistance-helper provides verbal cues and/or 

touching/steadying and/or contact guard assistance as patient completes 

activity. Assistance may be provided   


    throughout the activity or intermittently.


3-Partial/Moderate Assistance-helper does LESS THAN HALF the effort. [place] 

lifts, holds or supports trunk or limbs, but provides less than half the effort.


2-Substantial/Maximal Assistance-helper does MORE THAN HALF the effort. [place] 

lifts or holds trunk or limbs and provides more than half the effort.


7-Vzifrlfuk-slvhst does ALL the effort. Patient does none of the effort to 

complete the activity. Or, the assistance of 2 or more helpers is required for 

the patient to complete the  


    activity.


If activity was not attempted, code reason:


7-Patient Refused.


9-Not Applicable-not attempted and the patient did not perform the activity 

before the current illness, exacerbation or injury.


10-Not Attempted due to Environmental Limitations-(lack of equipment, weather 

restraints, etc.).


88-Not Attempted due to Medical Conditions or Safety Concerns.


Roll Left to Right (QC):  4


Sit to Lying (QC):  4


Sit to Stand (QC):  4


Chair/Bed-to-Chair Xfer(QC):  4


Car Transfer (QC):  3





Gait Training


Does the Patient Walk?:  Yes


Walk 10 feet (QC):  4


Walk 50 ft with 2 Turns(QC):  88


Walk 150 ft (QC):  88


Walking 10ft/uneven surface-QC:  88


Gait Persons Needed:  1 (plus w/c to f/u)


Gait Assistive Device:  Handheld Assist (HH right and rail in valente left)





Wheelchair Training


Does the Pt Use a Wheelchair?:  Yes


Distance:  150'x3


Wheel 50 ft with 2 turns (QC):  3


Wheel 150 ft (QC):  2


Type of Wheelchair:  Manual





Stair Training


1 Step (curb) (QC):  88


4 Steps (QC):  88


12 Steps (QC):  88





Balance


Picking up an Object (QC):  88





ADL-Treatment


Eating (QC):  2 (Max assist to initiate holding spoon, and then able to only 

hold briefly without OT putting it back in hand.  [place] assist to bring spoon to 

mouth.  Pt. unable to hold the dish in other hand.  Pt. used left hand to feed.)


Oral Hygiene (QC):  5


Bathing Location:  R Arm, L Upper Leg, R Upper Leg, Chest, Abdomen


Shower/Bathe Self (QC):  3


Upper Body Dressing (QC):  3


Lower Body Dressing (QC):  3


On/Off Footwear (QC):  3


Toileting Hygiene (QC):  1 (Pt. incontinent of bowel in shower.)


Toilet Transfer (QC):  3 (Mod assist to transfer to [facility] with therapist in front 

of her.)





Assessment/Plan


Assessment and Plan


Assess & Plan/Chief Complaint


Assessment per Luke Bainbridge, MSIII:


Critical illness myopathy


S/P CVA involving left occipital artery


S/P 3 vessel CABG


Debility


Expressive aphasia


Dysarthria


Right sided weakness


Diabetes mellitus


COPD


CAD


Dysphagia requiring PEG


Dumont cath in place





Plan:


1. Ensure adequate anticoagulation


2. PT, OT, and SLP eval's and treat


3. Oxygen via NC- wean as tolerated


4. Resume Home medications


5. Fall precautions


6. Dietary consult


7. DVT prophylaxis





11/13/20:


Patient participating well


No pain reported


Continue current meds


Advance diet





11/14/20:


Dumont DC soon


Continue home meds


Therapies to continue protocol





11/15/20:


Dr Tawil appreciated


Monitor BP


Monitor labs





11/16/20:


Bladder meds


Appreciate Dr Tawil


PT OT ST





11/17/20:


Voiding trial


Monitor closely


Variable moods





11/18/20:


Monitor BP


Encourage PO intake


Dumont cath management





11/19/20:


Encourage participation with therapies


Monitor dumont cath





11/20/20:


Monitor closely


Bladder management





(1) CVA (cerebral vascular accident)


(2) Dysarthria


(3) Dysphagia


(4) PEG (percutaneous endoscopic gastrostomy) status


(5) Expressive aphasia


(6) Smoker


(7) COPD (chronic obstructive pulmonary disease)


(8) CAD (coronary artery disease)


(9) Hx of CABG


(10) Dumont catheter in place


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











ANDREY BAH DO                Nov 20, 2020 12:26

## 2020-11-20 NOTE — NUR
PT FROYLAN SANDERS'D YESTERDAY AFTERNOON BY THIS RN.  PER REPORT NIGHT SHIFT RN LAST BLADDER 
SCANNED AND STRAIGHT CATHED @ APPROX [id number].  PT DENIES AT THIS TIME HAVING TO USE THE 
BATHROOM.  BLADDER SCANNED AT THIS TIME AMOUNT SCANNED SHOWED 46CC.

## 2020-11-20 NOTE — OCCUPATIONAL THER DAILY NOTE
OT Current Status-Daily Note


Subjective


Pt was in bed upon arrival. Pt no c/o pain. Pt agreed to therapy.





Mental Status/Objective


Patient Orientation:  Person, Place, Time, Situation





ADL-Treatment


Pt lying supin to EOB. Pt transferred from EOB to shower chair Min A. Pt was 

propelled down to shower room by DAVIS using shower chair. Pt don/doff UE with 

Mod A, needed help threading arms/head. DAVIS hiked briefs over hips for pt. Pt 

performed upper/lower body washing mod A with verbal cues using shower chair 

with cut out. DAVIS cleansed perineal, buttocks area. Pt transferred back to w/c 

from shower chair Min A. Pt performed LE dressing with Mod A, verbal cues to 

thread pants/socks on to feet. Pt was propelled back to room by DAVIS. Pt 

performed oral care, brushing hair at sink with verbal cues.


Therapy Code Descriptions/Definitions 





Functional [place] Measure:


0=Not Assessed/NA        4=Minimal Assistance


1=Total Assistance        5=Supervision or Setup


2=Maximal Assistance  6=Modified [place]


3=Moderate Assistance 7=Complete IndependenceSCALE: Activities may be completed 

with or without assistive devices.





6-Indepedent-patient completes the activity by him/herself with no assistance 

from a helper.


5-Set-up or Clean-up Assistance-helper sets up or cleans up; patient completes 

activity. [place] assists only prior to or  


    following the activity.


4-Supervision or Touching Assistance-helper provides verbal cues and/or 

touching/steadying and/or contact guard assistance as patient completes 

activity. Assistance may be provided   


    throughout the activity or intermittently.


3-Partial/Moderate Assistance-helper does LESS THAN HALF the effort. [place] 

lifts, holds or supports trunk or limbs, but provides less than half the effort.


2-Substantial/Maximal Assistance-helper does MORE THAN HALF the effort. [place] 

lifts or holds trunk or limbs and provides more than half the effort.


4-Hatohpxhk-tyyuht does ALL the effort. Patient does none of the effort to 

complete the activity. Or, the assistance of 2 or more helpers is required for 

the patient to complete the  


    activity.


If activity was not attempted, code reason:


7-Patient Refused.


9-Not Applicable-not attempted and the patient did not perform the activity 

before the current illness, exacerbation or injury.


10-Not Attempted due to Environmental Limitations-(lack of equipment, weather 

restraints, etc.).


88-Not Attempted due to Medical Conditions or Safety Concerns.


Oral Hygiene (QC):  5


Bathing Location:  R Arm, L Upper Leg, R Upper Leg, Chest, Abdomen


Shower/Bathe Self (QC):  3


Upper Body Dressing (QC):  3


Lower Body Dressing (QC):  3


On/Off Footwear:  3





Other Treatment


Pt was propelled to therapy gym by SUSAN. Pt performed peg board activity to work

on sequencing,finger manipulation, UE strengthening, crossing midline for daily 

functional task. Pt completed 7/7 patterns by stacking same colored pegs on top 

of each other. Pt performed B UE movements completing 5 bicep curls using self-

ROM. DAVIS moving R arm for pt to complete 5 remaining bicep curls. Pt was 

propelled back to room by SUSAN. Pt transferred to recliner from w/c Min A. Call 

light/phone in reach. All needs met.





OT Short Term Goals


Short Term Goals


Time Frame:  2020


Eating:  3


Oral hygiene:  3


Toileting hygiene:  3


Shower/bathe self:  3


Upper body dressin


Lower body dressing:  3


Putting on/taking off footwear:  3





OT Long Term Goals


Long Term Goals


Time Frame:  Dec 10, 2020


Eating (QC):  4


Oral Hygiene (QC):  4


Toileting Hygiene (QC):  3


Shower/Bathe Self (QC):  3


Upper Body Dressing (QC):  5


Lower Body Dressing (QC):  4


On/Off Footwear (QC):  4


Additional Goals:  1-Demonstrate ADL Tasks, 2-Verbalize Understanding, 3-

ImproveStrength/Nona


1=Demonstrate adherence to instructed precautions during ADL tasks.


2=Patient will verbalize/demonstrate understanding of assistive 

devices/modifications for ADL.


3=Patient will improve strength/tolerance for activity to enable patient to 

perform ADL's.





OT Education/Plan


Problem List/Assessment


Assessment:  Decreased Activ Tolerance, Decreased UE Strength, Impaired 

Cognition, Impaired Funct Balance, Impaired Self-Care Skills


Visual perceptual issues difficult to pinpoint due to severe global aphasia.





Discharge Recommendations


Plan/Recommendations:  Continue POC





Treatment Plan/Plan of Care


Patient would benefit from OT for education, treatment and training to promote 

independence in ADL's, mobility, safety and/or upper extremity function for 

ADL's.


Plan of Care:  ADL Retraining, Functional Mobility, Group Exercise/Act as Ind, 

UE Funct Exercise/Act


Treatment Duration:  Dec 10, 2020


Frequency:  At least 5 of 7 days/Wk (IRF)


Estimated Hrs Per Day:  1.5 hours per day


Agreement:  Yes


Rehab Potential:  Fair





Time/GCodes


Start Time:  09:00


Stop Time:  10:30


Total Time Billed (hr/min):  90


Billed Treatment Time


1 visit- ADL 4 (60 mins), FA 2 (30 mins)











BENTLEY CHACON               2020 10:21

## 2020-11-20 NOTE — NUR
PT SAT ON BEDSIDE COMMODE BUT ONLY HAD A SMALL BM, DID NOT VOID. BLADDER SCAN SHOWED 104CC 
AT THIS TIME. HAVE BEEN ENCOURAGING EXTRA FLUIDS THIS SHIFT.

## 2020-11-20 NOTE — PROGRESS NOTE - UROLOGY
Progress Note-Urology


Progress Notes/Assess & Plan


Progress/Assessment & Plan


STILL ON TOV


Final Diagnosis


RETENTION











TAWIL,ELIAS A MD               Nov 20, 2020 09:39

## 2020-11-21 NOTE — PHYSICAL THERAPY DAILY NOTE
PT Daily Note-Current


Subjective


Pt in bed upon arrival; pt agrees to PT tx.





Pain





   Numeric Pain Scale:  0-No Pain


   Location:  No Pain Reported





Mental Status


Patient Orientation:  Unable to Assess, Non-Verbal/Aphasic





Transfers


SCALE: Activities may be completed with or without assistive devices.





6-Indepedent-patient completes the activity by him/herself with no assistance 

from a helper.


5-Set-up or Clean-up Assistance-helper sets up or cleans up; patient completes 

activity. [place] assists only prior to or  


    following the activity.


4-Supervision or Touching Assistance-helper provides verbal cues and/or 

touching/steadying and/or contact guard assistance as patient completes 

activity. Assistance may be provided   


    throughout the activity or intermittently.


3-Partial/Moderate Assistance-helper does LESS THAN HALF the effort. [place] 

lifts, holds or supports trunk or limbs, but provides less than half the effort.


2-Substantial/Maximal Assistance-helper does MORE THAN HALF the effort. [place] 

lifts or holds trunk or limbs and provides more than half the effort.


9-Ztpagecvm-wgzzfm does ALL the effort. Patient does none of the effort to 

complete the activity. Or, the assistance of 2 or more helpers is required for 

the patient to complete the  


    activity.


If activity was not attempted, code reason:


7-Patient Refused.


9-Not Applicable-not attempted and the patient did not perform the activity 

before the current illness, exacerbation or injury.


10-Not Attempted due to Environmental Limitations-(lack of equipment, weather 

restraints, etc.).


88-Not Attempted due to Medical Conditions or Safety Concerns.


Roll Left & Right (QC):  2





Weight Bearing


Right Lower Extremity:  Right


Full Weight Bearing


Left Lower Extremity:  Left


Full Weight Bearing





Exercises


Supine Ex:  Ankle pumps, Heel Slides, Short Arc Quads, Straight leg raise, Hip 

abd/add


Supine Reps:  15 (reps each)





Treatments


Supine PROM ex completed in bed. Pt given VC's to complete ex, but is unable to 

follow VC's. PTA and RN pulled pt up in bed to better position pt; pt did not 

attempt to assist. Pt remains in bed w/ bedside table and call light w/in reach 

and all needs met, at end of tx.





Assessment


Current Status:  Poor Progress


Pt moaned constantly throughout PT tx. Pt constantly asked if in pain; pt did 

not respond.





PT Short Term Goals


Short Term Goals


Time Frame:  2020


Roll Left & Right:  4


Sit to lyin


Lying to sitting on side of be:  3


Chair/bed-to-chair transfer:  3


Walk 10 feet:  3


Does pt use a wc or scooter:  Yes


Wheel 50ft w/2 turns:  3


Wheel 150 feet:  3





PT Long Term Goals


Long Term Goals


PT Long Term Goals Time Frame:  Dec 3, 2020


Roll Left & Right (QC):  6


Sit to Lying (QC):  6


Lying-Sitting on Side/Bed(QC):  6


Sit to Stand (QC):  4


Chair/Bed-to-Chair Xfer(QC):  4


Toilet Transfer (QC):  4


Car Transfer (QC):  4


Does the Patient Walk:  Yes


Walk 10 feet (QC):  3


Walk 50ft with 2 Turns (QC):  3


Walk 150 ft (QC):  88


Walking 10ft on Uneven Surface:  3


1 Step (curb) (QC):  3


4 Steps (QC):  88


12 Steps (QC):  88


Picking up an Object (QC):  88


Does the Pt use WC or Scooter?:  Yes


Wheel 50 feet with 2 turns (QC:  6


Wheel 150 feet:  6





PT Plan


Problem List


Problem List:  Activity Tolerance, Functional Strength, Safety, Bed Mobility, 

ROM





Treatment/Plan


Treatment Plan:  Continue Plan of Care


Treatment Plan:  Bed Mobility, Education, Functional Activity Nona, Functional 

Strength, Group Therapy, Gait, Safety, Therapeutic Exercise, Transfers


Treatment Duration:  2020


Frequency:  At least 5 of 7 days/Wk (IRF)


Estimated Hrs Per Day:  1.5 hours per day


Patient and/or Family Agrees t:  Yes





Safety Risks/Education


Patient Education:  Correct Positioning, Safety Issues


Teaching Recipient:  Patient


Teaching Methods:  Discussion


Response to Teaching:  Unable to Return Demonstration, Unable to Comprehend





Time/GCodes


Time In:  901


Time Out:  916


Total Billed Treatment Time:  15


Total Billed Treatment


1, Ex (15m)











MIA ESPANA PTA           2020 11:00

## 2020-11-21 NOTE — PM&R PROGRESS NOTE
Subjective


HPI/CC On Admission


Date Seen by Provider:  Nov 21, 2020


Time Seen by Provider:  12:30


Subjective/Events-last exam


11/21/20:


Likely will need catheter replaced


No pain reported


Feeder maintained adequate consumption








11/20/20:


Patient about the same


Requires a feeder to eat


No pain reported


Confusion is a limitation








11/19/20:


Pt has varying participation with PT and OT 


Accu cheks changed to BID 


Will monitor pt closely 


Denies any pain 


Is a feeder








11/18/20:


Re inserted the dumont since urinary retention continued


Increasing oral fluids


Urecholine was increased dose wise


Bowels are moving








11/17/20:


Xanax given last night 


Bowels moved yesterday 


Refusing to eat supper or breakfast today 


Agitated a bit today 


Post-void residual had 238


Hadnt voided since discontinued the cath yesterday 








11/16/20:


Dr. Tawil will discontinue the catheter and evaluate voiding trial today 


Flomax and Urecholine maintained 


Bowels are moving 


Very difficult to recover from catastrophic stroke








11/15/20:


Patient participating with therapy


No pain reported


Dr Tawil consulted for dumont catheter








11/14/20:


Patient denies pain


Working well with structured therapies


Dumont cath still in place will need to DC soon


Working on BM regimen








Patient doing well


Settling in well


No pain reported


Working with PT OT and ST


Eating a bit more without aspiration


No falls


Checked meds and labs


Conferred with RN


Reviewed therapy notes





Review of Systems


General:  Fatigue, Malaise





Objective


Exam


Vital Signs





Vital Signs








  Date Time  Temp Pulse Resp B/P (MAP) Pulse Ox O2 Delivery O2 Flow Rate FiO2


 


11/21/20 17:53 36.7 91 20 124/63 (83) 98 Room Air  





Capillary Refill : Less Than 3 Seconds


General Appearance:  No Apparent Distress, Anxious, Chronically ill, Thin


HEENT:  PERRL/EOMI, Normal ENT Inspection, Pharynx Normal


Neck:  Full Range of Motion, Normal Inspection, Non Tender, Supple, Carotid 

Bruit


Respiratory:  Chest Non Tender, Lungs Clear, No Accessory Muscle Use, No 

Respiratory Distress, Decreased Breath Sounds


Cardiovascular:  Regular Rate, Rhythm, No Edema, No Gallop, No JVD, No Murmur, 

Normal Peripheral Pulses


Gastrointestinal:  Normal Bowel Sounds, No Organomegaly, No Pulsatile Mass, Non 

Tender, Soft


Back:  Normal Inspection, No CVA Tenderness, No Vertebral Tenderness


Extremity:  Normal Capillary Refill, Normal Inspection, Normal Range of Motion 

(except right sided weakness), Non Tender, No Calf Tenderness, No Pedal Edema


Neurologic/Psychiatric:  Alert, No Motor/Sensory Deficits, Normal Mood/Affect, 

Abnormal CNs II-XII, Aphasia, Facial Droop, Motor Weakness (right sided 

flaccidity)


Skin:  Normal Color, Warm/Dry


Lymphatic:  No Adenopathy





Results/Procedures


Lab


Patient resulted labs reviewed.





FIM


Transfers


Therapy Code Descriptions/Definitions 





Functional [place] Measure:


0=Not Assessed/NA        4=Minimal Assistance


1=Total Assistance        5=Supervision or Setup


2=Maximal Assistance  6=Modified [place]


3=Moderate Assistance 7=Complete IndependenceSCALE: Activities may be completed 

with or without assistive devices.





6-Indepedent-patient completes the activity by him/herself with no assistance 

from a helper.


5-Set-up or Clean-up Assistance-helper sets up or cleans up; patient completes 

activity. [place] assists only prior to or  


    following the activity.


4-Supervision or Touching Assistance-helper provides verbal cues and/or 

touching/steadying and/or contact guard assistance as patient completes 

activity. Assistance may be provided   


    throughout the activity or intermittently.


3-Partial/Moderate Assistance-helper does LESS THAN HALF the effort. [place] 

lifts, holds or supports trunk or limbs, but provides less than half the effort.


2-Substantial/Maximal Assistance-helper does MORE THAN HALF the effort. [place] 

lifts or holds trunk or limbs and provides more than half the effort.


0-Srjgvvqic-pwxfgg does ALL the effort. Patient does none of the effort to comp

lete the activity. Or, the assistance of 2 or more helpers is required for the 

patient to complete the  


    activity.


If activity was not attempted, code reason:


7-Patient Refused.


9-Not Applicable-not attempted and the patient did not perform the activity 

before the current illness, exacerbation or injury.


10-Not Attempted due to Environmental Limitations-(lack of equipment, weather 

restraints, etc.).


88-Not Attempted due to Medical Conditions or Safety Concerns.


Roll Left to Right (QC):  4


Sit to Lying (QC):  4


Sit to Stand (QC):  4


Chair/Bed-to-Chair Xfer(QC):  4


Car Transfer (QC):  3





Gait Training


Does the Patient Walk?:  Yes


Walk 10 feet (QC):  4


Walk 50 ft with 2 Turns(QC):  88


Walk 150 ft (QC):  88


Walking 10ft/uneven surface-QC:  88


Gait Persons Needed:  1 (plus w/c to f/u)


Gait Assistive Device:  Handheld Assist (HH right and rail in [place] left)





Wheelchair Training


Does the Pt Use a Wheelchair?:  Yes


Distance:  150'x3


Wheel 50 ft with 2 turns (QC):  3


Wheel 150 ft (QC):  2


Type of Wheelchair:  Manual





Stair Training


1 Step (curb) (QC):  88


4 Steps (QC):  88


12 Steps (QC):  88





Balance


Picking up an Object (QC):  88





ADL-Treatment


Eating (QC):  2 (Max assist to initiate holding spoon, and then able to only 

hold briefly without OT putting it back in hand.  [place] assist to bring spoon to 

mouth.  Pt. unable to hold the dish in other hand.  Pt. used left hand to feed.)


Oral Hygiene (QC):  5


Bathing Location:  R Arm, L Upper Leg, R Upper Leg, Chest, Abdomen


Shower/Bathe Self (QC):  3


Upper Body Dressing (QC):  3


Lower Body Dressing (QC):  3


On/Off Footwear (QC):  3


Toileting Hygiene (QC):  1 (Pt. incontinent of bowel in shower.)


Toilet Transfer (QC):  3 (Mod assist to transfer to [facility] with therapist in front 

of her.)





Assessment/Plan


Assessment and Plan


Assess & Plan/Chief Complaint


Assessment per Luke Bainbridge, MSIII:


Critical illness myopathy


S/P CVA involving left occipital artery


S/P 3 vessel CABG


Debility


Expressive aphasia


Dysarthria


Right sided weakness


Diabetes mellitus


COPD


CAD


Dysphagia requiring PEG


Dumont cath in place





Plan:


1. Ensure adequate anticoagulation


2. PT, OT, and SLP eval's and treat


3. Oxygen via NC- wean as tolerated


4. Resume Home medications


5. Fall precautions


6. Dietary consult


7. DVT prophylaxis





11/13/20:


Patient participating well


No pain reported


Continue current meds


Advance diet





11/14/20:


Dumont DC soon


Continue home meds


Therapies to continue protocol





11/15/20:


Dr Tawil appreciated


Monitor BP


Monitor labs





11/16/20:


Bladder meds


Appreciate Dr Tawil


PT OT ST





11/17/20:


Voiding trial


Monitor closely


Variable moods





11/18/20:


Monitor BP


Encourage PO intake


Dumont cath management





11/19/20:


Encourage participation with therapies


Monitor dumont cath





11/20/20:


Monitor closely


Bladder management





11/21/20:


Monitor closely


Cath maintained?





(1) CVA (cerebral vascular accident)


(2) Dysarthria


(3) Dysphagia


(4) PEG (percutaneous endoscopic gastrostomy) status


(5) Expressive aphasia


(6) Smoker


(7) COPD (chronic obstructive pulmonary disease)


(8) CAD (coronary artery disease)


(9) Hx of CABG


(10) Dumont catheter in place


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











ANDREY BAH DO                Nov 21, 2020 07:01

## 2020-11-21 NOTE — NUR
UNABLE TO VOID. BLADDER SCAN SHOWING 383MLS. GALEANO CATHETER PUT BACK IN AND URECHOLINE 
INCREASED TO 50 MG PER ORDERS. CLOUDY, YELLOW URINE IMMEDIATE RETURN. PATIENT TOLERATED 
WELL.

## 2020-11-22 NOTE — PM&R PROGRESS NOTE
Subjective


HPI/CC On Admission


Date Seen by Provider:  Nov 22, 2020


Time Seen by Provider:  14:00


Subjective/Events-last exam


11/22/20:


Dumont cath still in place


Urecholine 50mg dose now


BM today








11/21/20:


Likely will need catheter replaced


No pain reported


Feeder maintained adequate consumption








11/20/20:


Patient about the same


Requires a feeder to eat


No pain reported


Confusion is a limitation








11/19/20:


Pt has varying participation with PT and OT 


Accu cheks changed to BID 


Will monitor pt closely 


Denies any pain 


Is a feeder








11/18/20:


Re inserted the dumont since urinary retention continued


Increasing oral fluids


Urecholine was increased dose wise


Bowels are moving








11/17/20:


Xanax given last night 


Bowels moved yesterday 


Refusing to eat supper or breakfast today 


Agitated a bit today 


Post-void residual had 238


Hadnt voided since discontinued the cath yesterday 








11/16/20:


Dr. Tawil will discontinue the catheter and evaluate voiding trial today 


Flomax and Urecholine maintained 


Bowels are moving 


Very difficult to recover from catastrophic stroke








11/15/20:


Patient participating with therapy


No pain reported


Dr Tawil consulted for dumont catheter








11/14/20:


Patient denies pain


Working well with structured therapies


Dumont cath still in place will need to DC soon


Working on BM regimen








Patient doing well


Settling in well


No pain reported


Working with PT OT and ST


Eating a bit more without aspiration


No falls


Checked meds and labs


Conferred with RN


Reviewed therapy notes





Review of Systems


General:  Fatigue


Neurological:  Weakness, Incoordination, Confusion





Objective


Exam


Vital Signs





Vital Signs








  Date Time  Temp Pulse Resp B/P (MAP) Pulse Ox O2 Delivery O2 Flow Rate FiO2


 


11/22/20 16:04 36.4 76 18 136/72 (93) 99 Room Air  





Capillary Refill : Less Than 3 Seconds


General Appearance:  No Apparent Distress, Anxious, Chronically ill, Thin


HEENT:  PERRL/EOMI, Normal ENT Inspection, Pharynx Normal


Neck:  Full Range of Motion, Normal Inspection, Non Tender, Supple, Carotid 

Bruit


Respiratory:  Chest Non Tender, Lungs Clear, No Accessory Muscle Use, No 

Respiratory Distress, Decreased Breath Sounds


Cardiovascular:  Regular Rate, Rhythm, No Edema, No Gallop, No JVD, No Murmur, 

Normal Peripheral Pulses


Gastrointestinal:  Normal Bowel Sounds, No Organomegaly, No Pulsatile Mass, Non 

Tender, Soft


Back:  Normal Inspection, No CVA Tenderness, No Vertebral Tenderness


Extremity:  Normal Capillary Refill, Normal Inspection, Normal Range of Motion 

(except right sided weakness), Non Tender, No Calf Tenderness, No Pedal Edema


Neurologic/Psychiatric:  Alert, No Motor/Sensory Deficits, Normal Mood/Affect, 

Abnormal CNs II-XII, Aphasia, Facial Droop, Motor Weakness (right sided 

flaccidity)


Skin:  Normal Color, Warm/Dry


Lymphatic:  No Adenopathy





Results/Procedures


Lab


Patient resulted labs reviewed.





FIM


Transfers


Therapy Code Descriptions/Definitions 





Functional [place] Measure:


0=Not Assessed/NA        4=Minimal Assistance


1=Total Assistance        5=Supervision or Setup


2=Maximal Assistance  6=Modified [place]


3=Moderate Assistance 7=Complete IndependenceSCALE: Activities may be completed 

with or without assistive devices.





6-Indepedent-patient completes the activity by him/herself with no assistance 

from a helper.


5-Set-up or Clean-up Assistance-helper sets up or cleans up; patient completes 

activity. [place] assists only prior to or  


    following the activity.


4-Supervision or Touching Assistance-helper provides verbal cues and/or 

touching/steadying and/or contact guard assistance as patient completes 

activity. Assistance may be provided   


    throughout the activity or intermittently.


3-Partial/Moderate Assistance-helper does LESS THAN HALF the effort. [place] 

lifts, holds or supports trunk or limbs, but provides less than half the effort.


2-Substantial/Maximal Assistance-helper does MORE THAN HALF the effort. [place] 

lifts or holds trunk or limbs and provides more than half the effort.


3-Ymsxqbejc-nnootn does ALL the effort. Patient does none of the effort to 

complete the activity. Or, the assistance of 2 or more helpers is required for 

the patient to complete the  


    activity.


If activity was not attempted, code reason:


7-Patient Refused.


9-Not Applicable-not attempted and the patient did not perform the activity 

before the current illness, exacerbation or injury.


10-Not Attempted due to Environmental Limitations-(lack of equipment, weather 

restraints, etc.).


88-Not Attempted due to Medical Conditions or Safety Concerns.


Roll Left to Right (QC):  2


Sit to Lying (QC):  4


Sit to Stand (QC):  4


Chair/Bed-to-Chair Xfer(QC):  4


Car Transfer (QC):  3





Gait Training


Does the Patient Walk?:  Yes


Walk 10 feet (QC):  4


Walk 50 ft with 2 Turns(QC):  88


Walk 150 ft (QC):  88


Walking 10ft/uneven surface-QC:  88


Gait Persons Needed:  1 (plus w/c to f/u)


Gait Assistive Device:  Handheld Assist (HH right and rail in [place] left)





Wheelchair Training


Does the Pt Use a Wheelchair?:  Yes


Distance:  150'x3


Wheel 50 ft with 2 turns (QC):  3


Wheel 150 ft (QC):  2


Type of Wheelchair:  Manual





Stair Training


1 Step (curb) (QC):  88


4 Steps (QC):  88


12 Steps (QC):  88





Balance


Picking up an Object (QC):  88





ADL-Treatment


Eating (QC):  2 (Max assist to initiate holding spoon, and then able to only 

hold briefly without OT putting it back in hand.  [place] assist to bring spoon to 

mouth.  Pt. unable to hold the dish in other hand.  Pt. used left hand to feed.)


Oral Hygiene (QC):  5


Bathing Location:  R Arm, L Upper Leg, R Upper Leg, Chest, Abdomen


Shower/Bathe Self (QC):  3


Upper Body Dressing (QC):  3


Lower Body Dressing (QC):  3


On/Off Footwear (QC):  3


Toileting Hygiene (QC):  1 (Pt. incontinent of bowel in shower.)


Toilet Transfer (QC):  3 (Mod assist to transfer to [facility] with therapist in front 

of her.)





Assessment/Plan


Assessment and Plan


Assess & Plan/Chief Complaint


Assessment per Luke Bainbridge, MSIII:


Critical illness myopathy


S/P CVA involving left occipital artery


S/P 3 vessel CABG


Debility


Expressive aphasia


Dysarthria


Right sided weakness


Diabetes mellitus


COPD


CAD


Dysphagia requiring PEG


Dumont cath in place





Plan:


1. Ensure adequate anticoagulation


2. PT, OT, and SLP eval's and treat


3. Oxygen via NC- wean as tolerated


4. Resume Home medications


5. Fall precautions


6. Dietary consult


7. DVT prophylaxis





11/13/20:


Patient participating well


No pain reported


Continue current meds


Advance diet





11/14/20:


Dumont DC soon


Continue home meds


Therapies to continue protocol





11/15/20:


Dr Tawil appreciated


Monitor BP


Monitor labs





11/16/20:


Bladder meds


Appreciate Dr Tawil


PT OT ST





11/17/20:


Voiding trial


Monitor closely


Variable moods





11/18/20:


Monitor BP


Encourage PO intake


Dumont cath management





11/19/20:


Encourage participation with therapies


Monitor dumont cath





11/20/20:


Monitor closely


Bladder management





11/21/20:


Monitor closely


Cath maintained?





11/22/20:


Cath in place


Check labs in am


Increased dose of Urecholine





(1) CVA (cerebral vascular accident)


(2) Dysarthria


(3) Dysphagia


(4) PEG (percutaneous endoscopic gastrostomy) status


(5) Expressive aphasia


(6) Smoker


(7) COPD (chronic obstructive pulmonary disease)


(8) CAD (coronary artery disease)


(9) Hx of CABG


(10) Dumont catheter in place


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











ANDREY BAH DO                Nov 22, 2020 11:26

## 2020-11-23 NOTE — SPEECH THERAPY DAILY NOTE
Speech Daily Progress Note


Subjective


Date Seen by Provider:  Nov 23, 2020


Time Seen by Provider:  00:30


The patient was resting in her recliner watching the weather when I entered her 

room. Patient was alert and participated with therapy.





Objective


Patient answered general information questions with 60% accuracy given moderate 

verbal cues and/or repetitions.





Assessment


Assessment Current Status:  Fair Progress





Treatment Plan


Continue Plan of Care





Speech Short Term Goals


Short Term Goals


Short Term Goals


1) The patient will complete cognitive tasks related to memory, safety awareness

and problem solving at 80% with minimal cues.


2) The patient will complete speech tasks  to improve intelligibility at 80% 

with minimal cues.


3) The patient will complete confrontational naming tasks at 90% with minimal 

cues.


4) The patient will complete OME for improved oral status for safe oral intake 

at 80% or greater with minimal cues.





Speech Long Term Goals


Long Term Goals


Patient will improve communication abilities and safe oral intake in order to 

return to prior level.





Speech-Plan


Patient/Family Goals


Patient/Family Goals:  


Patient plans on discharging to her daughter's home.





Treatment Plan


Speech Therapy Treatment Plan:  Continue Plan of Care


Treatment Duration:  Nov 25, 2020


Frequency:  4 times per week (Patient will receive ST 4-5x per week)


Estimated Hrs Per Day:  .5 hour per day


Rehab Potential:  Fair


Barriers to Learning:  


Patient's recent CVA, level of progress, age


Pt/Family Agrees to Plan:  Yes





Safety Risks/Education


Teaching Recipient:  Patient


Teaching Methods:  Demonstration, Discussion


Response to Teaching:  Verbalize Understanding, Return Demonstration


Education Topics Provided:  


Continued safety within her room, utilizing the call light as needed


Continued safety with oral intake





Time


Speech Therapy Time In:  11:30


Speech Therapy Time Out:  12:00


Total Billed Time:  30


Billed Treatment Time


1, SEAN, FRANCHESKA Jacob            Nov 23, 2020 14:30

## 2020-11-23 NOTE — OCCUPATIONAL THER DAILY NOTE
OT Current Status-Daily Note


Subjective


Pt alert, lying in bed.  Pt moaning and making sounds of distress.  Pt appeared 

to be shivering, when asked if cold pt stated yes.  DAVIS got warm blanket and 

increased heat in room to make pt comfortable and decrease anxiety prior to 

working with pt.  Alerted nrsg to increased anxiety.  Pt agreed to therapy when 

anxiety decreased.





Mental Status/Objective


Patient Orientation:  Person, Unable to Assess, Non-Verbal/Aphasic, Situation


Attachments:  [place] Catheter





ADL-Treatment


Pt agrees to sponge bath.  Pt then began having stomach cramps and indicated she

wanted to use BSC.  Min A for supine to EOB.  CGA for transfer from EOB to BSC, 

SPT.  Pt attempted to cleanse after BM, DAVIS assisted to stabilize in standing 

and complete hygiene.  Pt then was able to eat fruit, applesauce and drink 

coffee by self after set up.  Pt set up for sponge bath sitting in w/c.  With 

verbal cues, pt able to was face, R UE and upper body.  Lower body cleansed 

during toileting.  Pt don upper body clothing Mod A to thread arms/head with 

verbal/physical cues. Pt don lower body clothing with verbal/physical cues, used

DAVIS to stabilize while hiking pants over L hip by self, assists with R hip. Pt 

was propelled to bathroom by DAVIS to perform oral care, brushing hair with set 

up. Pt required multiple verbal/physical cues to use toothpaste appropriately, 

pt unable to complete DAVIS set up oral care. Pt propelled back to bed by DAVIS. 

Pt transferred to bed CGA with verbal cues to adjust body. Pt EOB to supine SBA 

for safety. Call light/phone in reach, bed alarm set. All needs met.


Therapy Code Descriptions/Definitions 





Functional [place] Measure:


0=Not Assessed/NA        4=Minimal Assistance


1=Total Assistance        5=Supervision or Setup


2=Maximal Assistance  6=Modified [place]


3=Moderate Assistance 7=Complete IndependenceSCALE: Activities may be completed 

with or without assistive devices.





6-Indepedent-patient completes the activity by him/herself with no assistance 

from a helper.


5-Set-up or Clean-up Assistance-helper sets up or cleans up; patient completes 

activity. [place] assists only prior to or  


    following the activity.


4-Supervision or Touching Assistance-helper provides verbal cues and/or 

touching/steadying and/or contact guard assistance as patient completes 

activity. Assistance may be provided   


    throughout the activity or intermittently.


3-Partial/Moderate Assistance-helper does LESS THAN HALF the effort. [place] 

lifts, holds or supports trunk or limbs, but provides less than half the effort.


2-Substantial/Maximal Assistance-helper does MORE THAN HALF the effort. [place] 

lifts or holds trunk or limbs and provides more than half the effort.


2-Kmvrglcyu-ynqmsb does ALL the effort. Patient does none of the effort to 

complete the activity. Or, the assistance of 2 or more helpers is required for 

the patient to complete the  


    activity.


If activity was not attempted, code reason:


7-Patient Refused.


9-Not Applicable-not attempted and the patient did not perform the activity 

before the current illness, exacerbation or injury.


10-Not Attempted due to Environmental Limitations-(lack of equipment, weather 

restraints, etc.).


88-Not Attempted due to Medical Conditions or Safety Concerns.


Eating (QC):  5


Oral Hygiene (QC):  5


Bathing Location:  L Arm, L Upper Leg, R Upper Leg, Chest, Abdomen


Shower/Bathe Self (QC):  2 (mod A)


Upper Body Dressing (QC):  3 (mod A)


Lower Body Dressing (QC):  2 (Max A)


On/Off Footwear:  2


Toileting Hygiene (QC):  2


Toilet Transfer (QC):  4 (CGA)





OT Short Term Goals


Short Term Goals


Time Frame:  2020


Eating:  3


Oral hygiene:  3


Toileting hygiene:  3


Shower/bathe self:  3


Upper body dressin


Lower body dressing:  3


Putting on/taking off footwear:  3





OT Long Term Goals


Long Term Goals


Time Frame:  Dec 10, 2020


Eating (QC):  4


Oral Hygiene (QC):  4


Toileting Hygiene (QC):  3


Shower/Bathe Self (QC):  3


Upper Body Dressing (QC):  5


Lower Body Dressing (QC):  4


On/Off Footwear (QC):  4


Additional Goals:  1-Demonstrate ADL Tasks, 2-Verbalize Understanding, 3-

ImproveStrength/[place]


1=Demonstrate adherence to instructed precautions during ADL tasks.


2=Patient will verbalize/demonstrate understanding of assistive 

devices/modifications for ADL.


3=Patient will improve strength/tolerance for activity to enable patient to 

perform ADL's.





OT Education/Plan


Problem List/Assessment


Assessment:  Decreased Activ Tolerance, Decreased Safety Aware, Impaired 

Cognition, Impaired Coordination, Impaired Funct Balance, Impaired Self-Care 

Skills, Restricted Funct UE ROM, Visual-Perceptual Deficit


Visual perceptual issues difficult to pinpoint due to severe global aphasia.





Discharge Recommendations


Plan/Recommendations:  Continue POC





Treatment Plan/Plan of Care


Patient would benefit from OT for education, treatment and training to promote 

independence in ADL's, mobility, safety and/or upper extremity function for 

ADL's.


Plan of Care:  ADL Retraining, Functional Mobility, Group Exercise/Act as Ind, 

UE Funct Exercise/Act


Treatment Duration:  Dec 10, 2020


Frequency:  At least 5 of 7 days/Wk (IRF)


Estimated Hrs Per Day:  1.5 hours per day


Agreement:  Yes


Rehab Potential:  Fair





Time/GCodes


Start Time:  07:00


Stop Time:  08:30


Total Time Billed (hr/min):  90


Billed Treatment Time


1 visit-ADL 6 (90 min)











BENTLEY CHACON               2020 08:33

## 2020-11-23 NOTE — PHYSICAL THERAPY DAILY NOTE
PT Daily Note-Current


Subjective


Agrees to PT.  Eager to walk with PT.





Transfers


SCALE: Activities may be completed with or without assistive devices.





6-Indepedent-patient completes the activity by him/herself with no assistance 

from a helper.


5-Set-up or Clean-up Assistance-helper sets up or cleans up; patient completes 

activity. [place] assists only prior to or  


    following the activity.


4-Supervision or Touching Assistance-helper provides verbal cues and/or 

touching/steadying and/or contact guard assistance as patient completes 

activity. Assistance may be provided   


    throughout the activity or intermittently.


3-Partial/Moderate Assistance-helper does LESS THAN HALF the effort. [place] 

lifts, holds or supports trunk or limbs, but provides less than half the effort.


2-Substantial/Maximal Assistance-helper does MORE THAN HALF the effort. [place] 

lifts or holds trunk or limbs and provides more than half the effort.


5-Aufuodemw-omxfsc does ALL the effort. Patient does none of the effort to 

complete the activity. Or, the assistance of 2 or more helpers is required for 

the patient to complete the  


    activity.


If activity was not attempted, code reason:


7-Patient Refused.


9-Not Applicable-not attempted and the patient did not perform the activity 

before the current illness, exacerbation or injury.


10-Not Attempted due to Environmental Limitations-(lack of equipment, weather 

restraints, etc.).


88-Not Attempted due to Medical Conditions or Safety Concerns.


Sit to Lying (QC):  4 (CGA with cues for sequencing and safety. )


Chair/Bed-to-Chair Xfer(QC):  3 (Performed x 3 reps. )





Weight Bearing


Right Lower Extremity:  Right


Full Weight Bearing


Left Lower Extremity:  Left


Full Weight Bearing





Gait Training


Does the Patient Walk?:  Yes


Distance:  25 ft x 2


Gait Assistive Device:  Walker Platform


Requires assist to manage her walker and min assist for balance and to maintain 

upright mobility.  Cues for safety.  Tends to veer left and keep walker too far 

ahead.  Difficulty keeping right arm/hand on platform.





Treatments


Pt in bed post treatment with alarm activated and needs met.





Assessment


Current Status:  Good Progress


Cooperative and motivated.  Tolerated treatment well.  Still needs much assist 

with gait.





PT Short Term Goals


Short Term Goals


Time Frame:  2020


Roll Left & Right:  4


Sit to lyin (met)


Lying to sitting on side of be:  3


Chair/bed-to-chair transfer:  3 (met)


Walk 10 feet:  3


Does pt use a wc or scooter:  Yes


Wheel 50ft w/2 turns:  3


Wheel 150 feet:  3





PT Long Term Goals


Long Term Goals


PT Long Term Goals Time Frame:  Dec 3, 2020


Roll Left & Right (QC):  6


Sit to Lying (QC):  6


Lying-Sitting on Side/Bed(QC):  6


Sit to Stand (QC):  4


Chair/Bed-to-Chair Xfer(QC):  4


Toilet Transfer (QC):  4


Car Transfer (QC):  4


Does the Patient Walk:  Yes


Walk 10 feet (QC):  3


Walk 50ft with 2 Turns (QC):  3


Walk 150 ft (QC):  88


Walking 10ft on Uneven Surface:  3


1 Step (curb) (QC):  3


4 Steps (QC):  88


12 Steps (QC):  88


Picking up an Object (QC):  88


Does the Pt use WC or Scooter?:  Yes


Wheel 50 feet with 2 turns (QC:  6


Wheel 150 feet:  6





PT Plan


Problem List


Problem List:  Activity Tolerance, Functional Strength, Safety





Treatment/Plan


Treatment Plan:  Continue Plan of Care


Treatment Plan:  Bed Mobility, Education, Functional Activity Nona, Functional 

Strength, Group Therapy, Gait, Safety, Therapeutic Exercise, Transfers


Treatment Duration:  2020


Frequency:  At least 5 of 7 days/Wk (IRF)


Estimated Hrs Per Day:  1.5 hours per day


Patient and/or Family Agrees t:  Yes





Safety Risks/Education


Patient Education:  Gait Training


Teaching Recipient:  Patient


Teaching Methods:  Demonstration, Discussion


Response to Teaching:  Reinforcement Needed





Time/GCodes


Time In:  1330


Time Out:  1400


Total Billed Treatment Time:  30


Total Billed Treatment


visit


GT 30











BENTLEY PIERCE PT             2020 14:38

## 2020-11-23 NOTE — PM&R PROGRESS NOTE
Subjective


HPI/CC On Admission


Date Seen by Provider:  Nov 23, 2020


Time Seen by Provider:  09:00


Subjective/Events-last exam


11/23/20:


Pt still doing pretty well 


Participation in therapy varies 


Cognition is an issue 


Overall doing well enough to remain stable 








11/22/20:


Dumont cath still in place


Urecholine 50mg dose now


BM today








11/21/20:


Likely will need catheter replaced


No pain reported


Feeder maintained adequate consumption








11/20/20:


Patient about the same


Requires a feeder to eat


No pain reported


Confusion is a limitation








11/19/20:


Pt has varying participation with PT and OT 


Accu cheks changed to BID 


Will monitor pt closely 


Denies any pain 


Is a feeder








11/18/20:


Re inserted the dumont since urinary retention continued


Increasing oral fluids


Urecholine was increased dose wise


Bowels are moving








11/17/20:


Xanax given last night 


Bowels moved yesterday 


Refusing to eat supper or breakfast today 


Agitated a bit today 


Post-void residual had 238


Hadnt voided since discontinued the cath yesterday 








11/16/20:


Dr. Tawil will discontinue the catheter and evaluate voiding trial today 


Flomax and Urecholine maintained 


Bowels are moving 


Very difficult to recover from catastrophic stroke








11/15/20:


Patient participating with therapy


No pain reported


Dr Tawil consulted for dumont catheter








11/14/20:


Patient denies pain


Working well with structured therapies


Dumont cath still in place will need to DC soon


Working on BM regimen








Patient doing well


Settling in well


No pain reported


Working with PT OT and ST


Eating a bit more without aspiration


No falls


Checked meds and labs


Conferred with RN


Reviewed therapy notes





Review of Systems


General:  Fatigue, Malaise


Neurological:  Weakness, Incoordination, Confusion





Objective


Exam


Vital Signs





Vital Signs








  Date Time  Temp Pulse Resp B/P (MAP) Pulse Ox O2 Delivery O2 Flow Rate FiO2


 


11/23/20 21:26      Room Air  


 


11/23/20 20:27  79  139/67 (91)    


 


11/23/20 18:40 36.3  18  97   





Capillary Refill : Less Than 3 Seconds


General Appearance:  No Apparent Distress, Anxious, Chronically ill, Thin


HEENT:  PERRL/EOMI, Normal ENT Inspection, Pharynx Normal


Neck:  Full Range of Motion, Normal Inspection, Non Tender, Supple, Carotid 

Bruit


Respiratory:  Chest Non Tender, Lungs Clear, No Accessory Muscle Use, No 

Respiratory Distress, Decreased Breath Sounds


Cardiovascular:  Regular Rate, Rhythm, No Edema, No Gallop, No JVD, No Murmur, 

Normal Peripheral Pulses


Gastrointestinal:  Normal Bowel Sounds, No Organomegaly, No Pulsatile Mass, Non 

Tender, Soft


Back:  Normal Inspection, No CVA Tenderness, No Vertebral Tenderness


Extremity:  Normal Capillary Refill, Normal Inspection, Normal Range of Motion 

(except right sided weakness), Non Tender, No Calf Tenderness, No Pedal Edema


Neurologic/Psychiatric:  Alert, No Motor/Sensory Deficits, Normal Mood/Affect, 

Abnormal CNs II-XII, Aphasia, Facial Droop, Motor Weakness (right sided 

flaccidity)


Skin:  Normal Color, Warm/Dry


Lymphatic:  No Adenopathy





Results/Procedures


Lab


Laboratory Tests


11/23/20 05:48








Patient resulted labs reviewed.





FIM


Transfers


Therapy Code Descriptions/Definitions 





Functional [place] Measure:


0=Not Assessed/NA        4=Minimal Assistance


1=Total Assistance        5=Supervision or Setup


2=Maximal Assistance  6=Modified [place]


3=Moderate Assistance 7=Complete IndependenceSCALE: Activities may be completed 

with or without assistive devices.





6-Indepedent-patient completes the activity by him/herself with no assistance 

from a helper.


5-Set-up or Clean-up Assistance-helper sets up or cleans up; patient completes 

activity. [place] assists only prior to or  


    following the activity.


4-Supervision or Touching Assistance-helper provides verbal cues and/or 

touching/steadying and/or contact guard assistance as patient completes 

activity. Assistance may be provided   


    throughout the activity or intermittently.


3-Partial/Moderate Assistance-helper does LESS THAN HALF the effort. [place] 

lifts, holds or supports trunk or limbs, but provides less than half the effort.


2-Substantial/Maximal Assistance-helper does MORE THAN HALF the effort. [place] l

ifts or holds trunk or limbs and provides more than half the effort.


6-Fkpppeypo-cbdrrj does ALL the effort. Patient does none of the effort to 

complete the activity. Or, the assistance of 2 or more helpers is required for 

the patient to complete the  


    activity.


If activity was not attempted, code reason:


7-Patient Refused.


9-Not Applicable-not attempted and the patient did not perform the activity 

before the current illness, exacerbation or injury.


10-Not Attempted due to Environmental Limitations-(lack of equipment, weather 

restraints, etc.).


88-Not Attempted due to Medical Conditions or Safety Concerns.


Roll Left to Right (QC):  2


Sit to Lying (QC):  4


Sit to Stand (QC):  4


Chair/Bed-to-Chair Xfer(QC):  4


Car Transfer (QC):  3





Gait Training


Does the Patient Walk?:  Yes


Walk 10 feet (QC):  4


Walk 50 ft with 2 Turns(QC):  88


Walk 150 ft (QC):  88


Walking 10ft/uneven surface-QC:  88


Gait Persons Needed:  1 (plus w/c to f/u)


Gait Assistive Device:  Handheld Assist (HH right and rail in valetne left)





Wheelchair Training


Does the Pt Use a Wheelchair?:  Yes


Distance:  150'x3


Wheel 50 ft with 2 turns (QC):  3


Wheel 150 ft (QC):  2


Type of Wheelchair:  Manual





Stair Training


1 Step (curb) (QC):  88


4 Steps (QC):  88


12 Steps (QC):  88





Balance


Picking up an Object (QC):  88





ADL-Treatment


Eating (QC):  2 (Max assist to initiate holding spoon, and then able to only 

hold briefly without OT putting it back in hand.  [place] assist to bring spoon to 

mouth.  Pt. unable to hold the dish in other hand.  Pt. used left hand to feed.)


Oral Hygiene (QC):  5


Bathing Location:  R Arm, L Upper Leg, R Upper Leg, Chest, Abdomen


Shower/Bathe Self (QC):  3


Upper Body Dressing (QC):  3


Lower Body Dressing (QC):  3


On/Off Footwear (QC):  3


Toileting Hygiene (QC):  1 (Pt. incontinent of bowel in shower.)


Toilet Transfer (QC):  3 (Mod assist to transfer to [facility] with therapist in front 

of her.)





Assessment/Plan


Assessment and Plan


Assess & Plan/Chief Complaint


Assessment per Luke Bainbridge, MSIII:


Critical illness myopathy


S/P CVA involving left occipital artery


S/P 3 vessel CABG


Debility


Expressive aphasia


Dysarthria


Right sided weakness


Diabetes mellitus


COPD


CAD


Dysphagia requiring PEG


Dumont cath in place





Plan:


1. Ensure adequate anticoagulation


2. PT, OT, and SLP eval's and treat


3. Oxygen via NC- wean as tolerated


4. Resume Home medications


5. Fall precautions


6. Dietary consult


7. DVT prophylaxis





11/13/20:


Patient participating well


No pain reported


Continue current meds


Advance diet





11/14/20:


Dumont DC soon


Continue home meds


Therapies to continue protocol





11/15/20:


Dr Tawil appreciated


Monitor BP


Monitor labs





11/16/20:


Bladder meds


Appreciate Dr Tawil


PT OT ST





11/17/20:


Voiding trial


Monitor closely


Variable moods





11/18/20:


Monitor BP


Encourage PO intake


Dumont cath management





11/19/20:


Encourage participation with therapies


Monitor dumont cath





11/20/20:


Monitor closely


Bladder management





11/21/20:


Monitor closely


Cath maintained?





11/22/20:


Cath in place


Check labs in am


Increased dose of Urecholine





11/23/20:


Monitor bladder function


Increased dose of Urecholine





(1) CVA (cerebral vascular accident)


(2) Dysarthria


(3) Dysphagia


(4) PEG (percutaneous endoscopic gastrostomy) status


(5) Expressive aphasia


(6) Smoker


(7) COPD (chronic obstructive pulmonary disease)


(8) CAD (coronary artery disease)


(9) Hx of CABG


(10) Dumont catheter in place


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











ANDREY BAH DO                Nov 23, 2020 05:45

## 2020-11-23 NOTE — PHYSICAL THERAPY DAILY NOTE
PT Daily Note-Current


Subjective


Agreeable to PT.  Repeats, "good" and "ok baby" during session.  Also able to 

verbalize other short comments intermittently.





Pain





   Numeric Pain Scale:  0-No Pain





Transfers


SCALE: Activities may be completed with or without assistive devices.





6-Indepedent-patient completes the activity by him/herself with no assistance 

from a helper.


5-Set-up or Clean-up Assistance-helper sets up or cleans up; patient completes 

activity. [place] assists only prior to or  


    following the activity.


4-Supervision or Touching Assistance-helper provides verbal cues and/or 

touching/steadying and/or contact guard assistance as patient completes 

activity. Assistance may be provided   


    throughout the activity or intermittently.


3-Partial/Moderate Assistance-helper does LESS THAN HALF the effort. [place] 

lifts, holds or supports trunk or limbs, but provides less than half the effort.


2-Substantial/Maximal Assistance-helper does MORE THAN HALF the effort. [place] 

lifts or holds trunk or limbs and provides more than half the effort.


8-Fgmgppdfk-yqskff does ALL the effort. Patient does none of the effort to 

complete the activity. Or, the assistance of 2 or more helpers is required for 

the patient to complete the  


    activity.


If activity was not attempted, code reason:


7-Patient Refused.


9-Not Applicable-not attempted and the patient did not perform the activity 

before the current illness, exacerbation or injury.


10-Not Attempted due to Environmental Limitations-(lack of equipment, weather 

restraints, etc.).


88-Not Attempted due to Medical Conditions or Safety Concerns.


Sit to Lying (QC):  4


Lying to Sitting/Side of Bed(Q:  3 (min assist without a bed rail )


Sit to Stand (QC):  3 (min assist with skilled cues 95% of the time for hand 

placment and sequencing. )


Chair/Bed-to-Chair Xfer(QC):  3 (assist at gait belt and to manage FWW with 

platform)


pt performs SPT to her left with more ease than to the right (as expected) and 

requires assist to manage her walker.





Weight Bearing


Right Lower Extremity:  Right


Full Weight Bearing


Left Lower Extremity:  Left


Full Weight Bearing





Gait Training


Does the Patient Walk?:  Yes


Distance:  25 ft x 3


Walk 10 feet (QC):  3


Gait Assistive Device:  Walker Platform


Decreases step length and step through with wide DEB and decreased coordinated 

movements LE.  Assist to manage her walker as she tends to veer to the left.





Wheelchair Training


Does the Pt Use a Wheelchair?:  Yes


Wheel 50 ft with 2 turns (QC):  3 (Cues for task completing and min assist to 

propel)





Exercises


Supine Ex:  Bridging, Ankle pumps, Quad Set, Glut sets, Heel Slides, Short Arc 

Quads, D2 F/E UE


Supine Reps:  10 (Difficulty coordinating movements and needs cues throughout to

correctly perform.  Difficulty with right and left coordinated movement. )


Seated Therapy Exercises:  Long arc quads, Hip flexion


Seated Reps:  10





Treatments


Functional transfers, gait and LE strength for coordinated movement and strength

to improve upright mobility. 


Pt in recliner with legs elevated and chair alarm activated post treatment.





Assessment


Current Status:  Good Progress


Progressed with functional gait this date.  Transfers progressing.  Requires 

heavy cues for safety  and sequencing.  Cooperative and motivated.





PT Short Term Goals


Short Term Goals


Time Frame:  2020


Roll Left & Right:  4


Sit to lyin (met)


Lying to sitting on side of be:  3


Chair/bed-to-chair transfer:  3 (met)


Walk 10 feet:  3


Does pt use a wc or scooter:  Yes


Wheel 50ft w/2 turns:  3


Wheel 150 feet:  3





PT Long Term Goals


Long Term Goals


PT Long Term Goals Time Frame:  Dec 3, 2020


Roll Left & Right (QC):  6


Sit to Lying (QC):  6


Lying-Sitting on Side/Bed(QC):  6


Sit to Stand (QC):  4


Chair/Bed-to-Chair Xfer(QC):  4


Toilet Transfer (QC):  4


Car Transfer (QC):  4


Does the Patient Walk:  Yes


Walk 10 feet (QC):  3


Walk 50ft with 2 Turns (QC):  3


Walk 150 ft (QC):  88


Walking 10ft on Uneven Surface:  3


1 Step (curb) (QC):  3


4 Steps (QC):  88


12 Steps (QC):  88


Picking up an Object (QC):  88


Does the Pt use WC or Scooter?:  Yes


Wheel 50 feet with 2 turns (QC:  6


Wheel 150 feet:  6





PT Plan


Problem List


Problem List:  Activity Tolerance, Functional Strength, Safety, Balance, Gait, 

Transfer, Bed Mobility





Treatment/Plan


Treatment Plan:  Continue Plan of Care


Treatment Plan:  Bed Mobility, Education, Functional Activity Nona, Functional 

Strength, Group Therapy, Gait, Safety, Therapeutic Exercise, Transfers


Treatment Duration:  2020


Frequency:  At least 5 of 7 days/Wk (IRF)


Estimated Hrs Per Day:  1.5 hours per day


Patient and/or Family Agrees t:  Yes





Safety Risks/Education


Patient Education:  Transfer Techniques, Safety Issues


Teaching Recipient:  Patient


Teaching Methods:  Demonstration


Response to Teaching:  Reinforcement Needed





Discharge Recommendations


Therapy Discharge Recommendati:  Post Acute PT





Time/GCodes


Time In:  900


Time Out:  1000


Total Billed Treatment Time:  60


Total Billed Treatment


visit


WC 15


GT 15


EX 30











BENTLEY PIERCE PT             2020 10:09

## 2020-11-23 NOTE — PROGRESS NOTE - UROLOGY
Progress Note-Urology


Progress Notes/Assess & Plan


Progress/Assessment & Plan


GALEANO BACK IN. TOLERATES URECHOLINE WELL. TOV TOMORROW


Final Diagnosis


RETENTION











TAWIL,ELIAS A MD               Nov 23, 2020 12:37

## 2020-11-24 NOTE — OCCUPATIONAL THER DAILY NOTE
OT Current Status-Daily Note


Subjective


No pain reported.





Appearance


Pt. in bed.  Family present for family training.





Mental Status/Objective


Patient Orientation:  Person, Place





ADL-Treatment


Therapy Code Descriptions/Definitions 





Functional [place] Measure:


0=Not Assessed/NA        4=Minimal Assistance


1=Total Assistance        5=Supervision or Setup


2=Maximal Assistance  6=Modified [place]


3=Moderate Assistance 7=Complete IndependenceSCALE: Activities may be completed 

with or without assistive devices.





6-Indepedent-patient completes the activity by him/herself with no assistance 

from a helper.


5-Set-up or Clean-up Assistance-helper sets up or cleans up; patient completes 

activity. [place] assists only prior to or  


    following the activity.


4-Supervision or Touching Assistance-helper provides verbal cues and/or touc

candy/steadying and/or contact guard assistance as patient completes activity. 

Assistance may be provided   


    throughout the activity or intermittently.


3-Partial/Moderate Assistance-helper does LESS THAN HALF the effort. [place] 

lifts, holds or supports trunk or limbs, but provides less than half the effort.


2-Substantial/Maximal Assistance-helper does MORE THAN HALF the effort. [place] 

lifts or holds trunk or limbs and provides more than half the effort.


9-Nmzdlfiaf-swdsqr does ALL the effort. Patient does none of the effort to 

complete the activity. Or, the assistance of 2 or more helpers is required for 

the patient to complete the  


    activity.


If activity was not attempted, code reason:


7-Patient Refused.


9-Not Applicable-not attempted and the patient did not perform the activity 

before the current illness, exacerbation or injury.


10-Not Attempted due to Environmental Limitations-(lack of equipment, weather 

restraints, etc.).


88-Not Attempted due to Medical Conditions or Safety Concerns.


Oral Hygiene (QC):  4 (SBA and constant cues to brush teeth while seated at 

sink.)


Shower/Bathe Self (QC):  2 (Max assist overall.  Pt. is able to wash her chest 

and top of her legs.  OT washes under each arm and under breasts, as well as 

rear aurelio area and bilateral feet.  Pt. is cued to wash parts, but due to 

aphasia, is not always able to understand.)


Upper Body Dressing (QC):  2 (Max assist to doff and don shirt.)


Lower Body Dressing (QC):  2 (Max assist to don brief and pants.)


On/Off Footwear:  1 (Dependent to don socks and slippers.)





Other Treatment


Pt's family present to determine pt's needs for home.  OT and PT facilitated co-

treatment to address UE mobility, LE mobility, balance, ambulation, ADLs, 

vision, communication abilities, and overall progress.  Pt. transferred supine-

sit with SBA and was able to complete sponge bath sitting on side of bed.  

Tolerated this well.  Family is able to observe all ADLs and ask questions 

throughout.  OT/PT educated them on right sided neglect, and inability to always

understand what is being communicated to her, and inability to always 

communicate.  Both family members verbalized understanding and seemed impressed 

at pt's progress overall thus far.  Family educated in PROM as well as AROM 

performance of right UE.  Both were educated in possible equipment needs, 

including platform walker, manual wheelchair, and BSC.  After session, family 

re-directed to social work for continued education and discharge planning.  All 

needs met back in room and pt. in reclining chair.





Education


OT Patient Education:  Correct positioning, Exercise program, Home exercise 

program, Modified ADL techniques, Progress toward Goal/Update tx plan, Purpose 

of tx/functional activities, Reviewed precautions, Rehab process, Transfer 

techniques


Teaching Recipient:  Patient


Teaching Methods:  Demonstration, Discussion


Response to Teaching:  Verbalize Understanding, Return Demonstration





OT Short Term Goals


Short Term Goals


Time Frame:  2020


Eating:  3


Oral hygiene:  3


Toileting hygiene:  3


Shower/bathe self:  3


Upper body dressin


Lower body dressing:  3


Putting on/taking off footwear:  3





OT Long Term Goals


Long Term Goals


Time Frame:  Dec 10, 2020


Eating (QC):  4


Oral Hygiene (QC):  4


Toileting Hygiene (QC):  3


Shower/Bathe Self (QC):  3


Upper Body Dressing (QC):  5


Lower Body Dressing (QC):  4


On/Off Footwear (QC):  4


Additional Goals:  1-Demonstrate ADL Tasks, 2-Verbalize Understanding, 3-I

mproveStrength/Nona


1=Demonstrate adherence to instructed precautions during ADL tasks.


2=Patient will verbalize/demonstrate understanding of assistive 

devices/modifications for ADL.


3=Patient will improve strength/tolerance for activity to enable patient to 

perform ADL's.





OT Education/Plan


Problem List/Assessment


Assessment:  Decreased Activ Tolerance, Decreased Safety Aware, Decreased UE 

Strength, Impaired Cognition, Impaired Coordination, Impaired Funct Balance, 

Impaired I ADL's, Impaired Self-Care Skills, Restricted Funct UE ROM, Visual-

Perceptual Deficit


Visual perceptual issues difficult to pinpoint due to severe global aphasia.





Discharge Recommendations


Plan/Recommendations:  Continue POC


Therapy Discharge Recommendati:  Scheduled Assistance, Home & Family, Post Acute

OT


Comment


Pt. will need platform walker, manual wheelchair, and BSC.  Would also possibly 

benefit from shower chair.





Treatment Plan/Plan of Care


Treatment,Training & Education:  Yes


Patient would benefit from OT for education, treatment and training to promote 

independence in ADL's, mobility, safety and/or upper extremity function for AD

L's.


Plan of Care:  ADL Retraining, Caregiver Training, Cognitive Retraining, 

Functional Mobility, Group Exercise/Act as Ind, UE Funct Exercise/Act, UE 

Neuromus Re-Ed/Coord, Visual/Perceptual Retrain, W/C Management Training


Treatment Duration:  Dec 10, 2020


Frequency:  At least 5 of 7 days/Wk (IRF)


Estimated Hrs Per Day:  1.5 hours per day


Agreement:  Yes


Rehab Potential:  Fair





Time/GCodes


Start Time:  08:50


Stop Time:  10:05


Total Time Billed (hr/min):  75


Billed Treatment Time


1, ADL x 45minutes, FA x 30minutes











ROSANNA LOVING OT           2020 14:04

## 2020-11-24 NOTE — NUR
CM/SS CONCURRENT DOCUMENTATION

Son Abad Williamson and daughter Monik Chahal here this a.m. to participate with patient 
therapy sessions as part of discharge planning.  Writer intermittently visited to check on 
progress and family feedback.  Later met with patient and both children, they are all in 
agreement to continue to plan for patient to go to Monik's home where she will reside.  
Monik has prepared a room for patient.



HHC:  Will set up with an agency in [facility] service area for RN PT OT ST.  



DME:  Will need FWW with Right Platform.  Children utilize [employer] services in [place], patient is 
not eligible.  However, Monik indicated she had spoken to them and likely would be able to 
access resources on behalf of patient as a caregiver.  Abad and Monik understand that 
patient can only get the FWW or the wheelchair through Medicare and that they would need to 
get the alternate.  Additionally, they understand that the BSC and Tub Transfer Bench would 
be private pay items.  Abad plans to pursue these through an agency in [place].



PRIVATE PAY:  Children have resources from writer for in-home caregivers.  They will pursue 
agency vs private individuals for caregiving of patient while Monik is at work.  She works 
for the [employer] and is going to pursue FMLA so she can be at home as needed.



The children brought DPOA and POA-HC documents for patient's signature.  Family reviewed 
with patient thoroughly, writer followed up by asking patient if she understood the specific 
content of each document.  She answered to writer satisfaction that she definitely did and 
that she trusted her children to care for her and to manage her legal/financial matters.  
Patient is right dominant and is with right-sided neglect post CVA.  She initialed the 
documents with her left hand with pen with adaptive gripper.  She stayed on task to complete 
the documents.  Originals and 5 copies provided for children, one copy to ARU chart for 
permanent record.  



Patient will be reviewed in Patient Care Conference tomorrow, anticipate setting a target 
discharge date.

## 2020-11-24 NOTE — NUR
[facility]  ADMIN EARLY FOR 0530 BP READIN/72.  FOLLOW UP /78, HR 86 AT THIS TIME. 
CONT TO MONITOR.

## 2020-11-24 NOTE — PHYSICAL THERAPY DAILY NOTE
PT Daily Note-Current


Subjective


Pt sitting up in bed upon arrival.  Family(daughter & son) is present for Family

Training and co-treat for PT/OT.





Pain





   Location:  No Pain Reported





Mental Status


Patient Orientation:  Person, Mumbles


Attachments:  Roldan Catheter





Transfers


SCALE: Activities may be completed with or without assistive devices.





6-Indepedent-patient completes the activity by him/herself with no assistance 

from a helper.


5-Set-up or Clean-up Assistance-helper sets up or cleans up; patient completes 

activity. [place] assists only prior to or  


    following the activity.


4-Supervision or Touching Assistance-helper provides verbal cues and/or 

touching/steadying and/or contact guard assistance as patient completes 

activity. Assistance may be provided   


    throughout the activity or intermittently.


3-Partial/Moderate Assistance-helper does LESS THAN HALF the effort. Hudson 

lifts, holds or supports trunk or limbs, but provides less than half the effort.


2-Substantial/Maximal Assistance-helper does MORE THAN HALF the effort. Hudson 

lifts or holds trunk or limbs and provides more than half the effort.


5-Jqjesmvbx-pxttdh does ALL the effort. Patient does none of the effort to 

complete the activity. Or, the assistance of 2 or more helpers is required for 

the patient to complete the  


    activity.


If activity was not attempted, code reason:


7-Patient Refused.


9-Not Applicable-not attempted and the patient did not perform the activity 

before the current illness, exacerbation or injury.


10-Not Attempted due to Environmental Limitations-(lack of equipment, weather 

restraints, etc.).


88-Not Attempted due to Medical Conditions or Safety Concerns.


Lying to Sitting/Side of Bed(Q:  4


Sit to Stand (QC):  4





Weight Bearing


Right Lower Extremity:  Right


Full Weight Bearing


Left Lower Extremity:  Left


Full Weight Bearing





Gait Training


Does the Patient Walk?:  Yes


Distance:  50'


Walk 10 feet (QC):  3


Walk 50 ft with 2 Turns(QC):  3


Gait Persons Needed:  1


Gait Assistive Device:  Walker Platform


Attempted HHA due to pt pushing/drifting to L side but both pt and PTA did not 

feel this was safe option.





Exercises


Seated Therapy Exercises:  Ankle pumps, Long arc quads, Hip flexion, Kicking 

activity, Hip abd/add


Seated Reps:  15





Treatments


Pt's family present to determine pt's needs for home.  OT and PT facilitated co-

treatment to address UE mobility, LE mobility, balance, ambulation, ADLs, 

vision, communication abilities, and overall progress.  Pt. transferred supine-

sit with SBA and was able to complete sponge bath sitting on side of bed.  

Tolerated this well.  Family is able to observe all ADLs and ask questions 

throughout.  OT/PT educated them on right sided neglect, and inability to always

understand what is being communicated to her, and inability to always 

communicate.  Both family members verbalized understanding and seemed impressed 

at pt's progress overall thus far.  Family educated in PROM as well as AROM 

performance of right UE.  Both were educated in possible equipment needs, 

including platform walker, manual wheelchair, and BSC.  After session, family 

re-directed to social work for continued education and discharge planning.  All 

needs met back in room and pt. in reclining chair.





Assessment


Current Status:  Good Progress


Pt has improved with sitting balance as well as transfers, still need TC & VC 

for safety though.





PT Short Term Goals


Short Term Goals


Time Frame:  2020


Roll Left & Right:  4


Sit to lyin (met)


Lying to sitting on side of be:  3


Chair/bed-to-chair transfer:  3 (met)


Walk 10 feet:  3


Does pt use a wc or scooter:  Yes


Wheel 50ft w/2 turns:  3


Wheel 150 feet:  3





PT Long Term Goals


Long Term Goals


PT Long Term Goals Time Frame:  Dec 3, 2020


Roll Left & Right (QC):  6


Sit to Lying (QC):  6


Lying-Sitting on Side/Bed(QC):  6


Sit to Stand (QC):  4


Chair/Bed-to-Chair Xfer(QC):  4


Toilet Transfer (QC):  4


Car Transfer (QC):  4


Does the Patient Walk:  Yes


Walk 10 feet (QC):  3


Walk 50ft with 2 Turns (QC):  3


Walk 150 ft (QC):  88


Walking 10ft on Uneven Surface:  3


1 Step (curb) (QC):  3


4 Steps (QC):  88


12 Steps (QC):  88


Picking up an Object (QC):  88


Does the Pt use WC or Scooter?:  Yes


Wheel 50 feet with 2 turns (QC:  6


Wheel 150 feet:  6





PT Plan


Problem List


Problem List:  Activity Tolerance, Safety, Balance, Gait





Treatment/Plan


Treatment Plan:  Continue Plan of Care


Treatment Plan:  Bed Mobility, Education, Functional Activity Nona, Functional 

Strength, Group Therapy, Gait, Safety, Therapeutic Exercise, Transfers


Treatment Duration:  2020


Frequency:  At least 5 of 7 days/Wk (IRF)


Estimated Hrs Per Day:  1.5 hours per day


Patient and/or Family Agrees t:  Yes





Safety Risks/Education


Patient Education:  Gait Training, Transfer Techniques, Issued Written HEP, 

Correct Positioning, Safety Issues


Teaching Recipient:  Patient, Family


Teaching Methods:  Discussion


Response to Teaching:  Verbalize Understanding





Discharge Recommendations


Therapy Discharge Recommendati:  Post Acute PT, Post Acute ST, Post Acute OT


Equpiment Recommendations-D/C:  Standard Walker





Time/GCodes


Time In:  900


Time Out:  1015


Total Billed Treatment Time:  75


Total Billed Treatment


1, GT x2 (25m), EX (20m) & FA x2 (30m)











PATEL WELLS PTA              2020 10:20

## 2020-11-24 NOTE — SPEECH THERAPY DAILY NOTE
Speech Daily Progress Note


Subjective


Date Seen by Provider:  Nov 24, 2020


Time Seen by Provider:  00:30


Patient was resting in her recliner following family training with her son and 

daughter.





Objective


Patient followed directions to initial areas on her DPOA papers with 100% given 

verbal cues for each.





Assessment


Assessment Current Status:  Good Progress





Treatment Plan


Continue Plan of Care





Speech Short Term Goals


Short Term Goals


Short Term Goals


1) The patient will complete cognitive tasks related to memory, safety awareness

and problem solving at 80% with minimal cues.


2) The patient will complete speech tasks  to improve intelligibility at 80% 

with minimal cues.


3) The patient will complete confrontational naming tasks at 90% with minimal 

cues.


4) The patient will complete OME for improved oral status for safe oral intake 

at 80% or greater with minimal cues.





Speech Long Term Goals


Long Term Goals


Patient will improve communication abilities and safe oral intake in order to 

return to prior level.





Speech-Plan


Patient/Family Goals


Patient/Family Goals:  


Patient will discharge to her daughter's home.





Treatment Plan


Speech Therapy Treatment Plan:  Continue Plan of Care


Treatment Duration:  Nov 25, 2020


Frequency:  4 times per week (Patient will receive ST 4-5x per week)


Estimated Hrs Per Day:  .5 hour per day


Rehab Potential:  Fair


Barriers to Learning:  


Patient's recent CVA, age


Pt/Family Agrees to Plan:  Yes





Safety Risks/Education


Teaching Recipient:  Patient, Family


Teaching Methods:  Demonstration, Discussion


Response to Teaching:  Verbalize Understanding, Return Demonstration


Education Topics Provided:  


Continued safety upon her return home


Continued safety of oral intake





Time


Speech Therapy Time In:  10:30


Speech Therapy Time Out:  11:00


Total Billed Time:  30


Billed Treatment Time


1, SEAN, FRANCHESKA Jacob            Nov 24, 2020 13:40

## 2020-11-24 NOTE — PM&R PROGRESS NOTE
Subjective


HPI/CC On Admission


Date Seen by Provider:  Nov 24, 2020


Time Seen by Provider:  08:40


Subjective/Events-last exam


11/24/20:


Pt doing pretty well 


Re-inserted dumont due to retention 


Yeast infection will be managed with Diflucan and Monistat cream








11/23/20:


Pt still doing pretty well 


Participation in therapy varies 


Cognition is an issue 


Overall doing well enough to remain stable 








11/22/20:


Dumont cath still in place


Urecholine 50mg dose now


BM today








11/21/20:


Likely will need catheter replaced


No pain reported


Feeder maintained adequate consumption








11/20/20:


Patient about the same


Requires a feeder to eat


No pain reported


Confusion is a limitation








11/19/20:


Pt has varying participation with PT and OT 


Accu cheks changed to BID 


Will monitor pt closely 


Denies any pain 


Is a feeder








11/18/20:


Re inserted the dumont since urinary retention continued


Increasing oral fluids


Urecholine was increased dose wise


Bowels are moving








11/17/20:


Xanax given last night 


Bowels moved yesterday 


Refusing to eat supper or breakfast today 


Agitated a bit today 


Post-void residual had 238


Hadnt voided since discontinued the cath yesterday 








11/16/20:


Dr. Tawil will discontinue the catheter and evaluate voiding trial today 


Flomax and Urecholine maintained 


Bowels are moving 


Very difficult to recover from catastrophic stroke








11/15/20:


Patient participating with therapy


No pain reported


Dr Tawil consulted for dumont catheter








11/14/20:


Patient denies pain


Working well with structured therapies


Dumont cath still in place will need to DC soon


Working on BM regimen








Patient doing well


Settling in well


No pain reported


Working with PT OT and ST


Eating a bit more without aspiration


No falls


Checked meds and labs


Conferred with RN


Reviewed therapy notes





Review of Systems


General:  Malaise


Neurological:  Weakness, Incoordination





Objective


Exam


Vital Signs





Vital Signs








  Date Time  Temp Pulse Resp B/P (MAP) Pulse Ox O2 Delivery O2 Flow Rate FiO2


 


11/25/20 06:07 36.4 80 18 130/59 (82) 94 Room Air  





Capillary Refill : Less Than 3 Seconds


General Appearance:  No Apparent Distress, Anxious, Chronically ill, Thin


HEENT:  PERRL/EOMI, Normal ENT Inspection, Pharynx Normal


Neck:  Full Range of Motion, Normal Inspection, Non Tender, Supple, Carotid 

Bruit


Respiratory:  Chest Non Tender, Lungs Clear, No Accessory Muscle Use, No 

Respiratory Distress, Decreased Breath Sounds


Cardiovascular:  Regular Rate, Rhythm, No Edema, No Gallop, No JVD, No Murmur, 

Normal Peripheral Pulses


Gastrointestinal:  Normal Bowel Sounds, No Organomegaly, No Pulsatile Mass, Non 

Tender, Soft


Back:  Normal Inspection, No CVA Tenderness, No Vertebral Tenderness


Extremity:  Normal Capillary Refill, Normal Inspection, Normal Range of Motion 

(except right sided weakness), Non Tender, No Calf Tenderness, No Pedal Edema


Neurologic/Psychiatric:  Alert, No Motor/Sensory Deficits, Normal Mood/Affect, 

Abnormal CNs II-XII, Aphasia, Facial Droop, Motor Weakness (right sided 

flaccidity)


Skin:  Normal Color, Warm/Dry


Lymphatic:  No Adenopathy





Results/Procedures


Lab


Patient resulted labs reviewed.





FIM


Transfers


Therapy Code Descriptions/Definitions 





Functional [place] Measure:


0=Not Assessed/NA        4=Minimal Assistance


1=Total Assistance        5=Supervision or Setup


2=Maximal Assistance  6=Modified [place]


3=Moderate Assistance 7=Complete IndependenceSCALE: Activities may be completed 

with or without assistive devices.





6-Indepedent-patient completes the activity by him/herself with no assistance 

from a helper.


5-Set-up or Clean-up Assistance-helper sets up or cleans up; patient completes 

activity. [place] assists only prior to or  


    following the activity.


4-Supervision or Touching Assistance-helper provides verbal cues and/or 

touching/steadying and/or contact guard assistance as patient completes 

activity. Assistance may be provided   


    throughout the activity or intermittently.


3-Partial/Moderate Assistance-helper does LESS THAN HALF the effort. Ringling 

lifts, holds or supports trunk or limbs, but provides less than half the effort.


2-Substantial/Maximal Assistance-helper does MORE THAN HALF the effort. [place]ling 

lifts or holds trunk or limbs and provides more than half the effort.


9-[id number]-mttkef does ALL the effort. Patient does none of the effort to 

complete the activity. Or, the assistance of 2 or more helpers is required for 

the patient to complete the  


    activity.


If activity was not attempted, code reason:


7-Patient Refused.


9-Not Applicable-not attempted and the patient did not perform the activity 

before the current illness, exacerbation or injury.


10-Not Attempted due to Environmental Limitations-(lack of equipment, weather 

restraints, etc.).


88-Not Attempted due to Medical Conditions or Safety Concerns.


Roll Left to Right (QC):  2


Sit to Lying (QC):  4 (CGA with cues for sequencing and safety. )


Sit to Stand (QC):  3 (min assist with skilled cues 95% of the time for hand 

placment and sequencing. )


Chair/Bed-to-Chair Xfer(QC):  3 (Performed x 3 reps. )


Car Transfer (QC):  3





Gait Training


Does the Patient Walk?:  Yes


Distance:  25 ft x 2


Walk 10 feet (QC):  3


Walk 50 ft with 2 Turns(QC):  88


Walk 150 ft (QC):  88


Walking 10ft/uneven surface-QC:  88


Gait Persons Needed:  1 (plus w/c to f/u)


Gait Assistive Device:  Walker Platform





Wheelchair Training


Does the Pt Use a Wheelchair?:  Yes


Distance:  150'x3


Wheel 50 ft with 2 turns (QC):  3 (Cues for task completing and min assist to 

propel)


Wheel 150 ft (QC):  2


Type of Wheelchair:  Manual





Stair Training


1 Step (curb) (QC):  88


4 Steps (QC):  88


12 Steps (QC):  88





Balance


Picking up an Object (QC):  88





ADL-Treatment


Eating (QC):  5


Oral Hygiene (QC):  5


Bathing Location:  L Arm, L Upper Leg, R Upper Leg, Chest, Abdomen


Shower/Bathe Self (QC):  2 (mod A)


Upper Body Dressing (QC):  3 (mod A)


Lower Body Dressing (QC):  2 (Max A)


On/Off Footwear (QC):  2


Toileting Hygiene (QC):  2


Toilet Transfer (QC):  4 (CGA)





Assessment/Plan


Assessment and Plan


Assess & Plan/Chief Complaint


Assessment per Luke Bainbridge, MSIII:


Critical illness myopathy


S/P CVA involving left occipital artery


S/P 3 vessel CABG


Debility


Expressive aphasia


Dysarthria


Right sided weakness


Diabetes mellitus


COPD


CAD


Dysphagia requiring PEG


Dumont cath in place





Plan:


1. Ensure adequate anticoagulation


2. PT, OT, and SLP eval's and treat


3. Oxygen via NC- wean as tolerated


4. Resume Home medications


5. Fall precautions


6. Dietary consult


7. DVT prophylaxis





11/13/20:


Patient participating well


No pain reported


Continue current meds


Advance diet





11/14/20:


Dumont DC soon


Continue home meds


Therapies to continue protocol





11/15/20:


Dr Tawil appreciated


Monitor BP


Monitor labs





11/16/20:


Bladder meds


Appreciate Dr Tawil


PT OT ST





11/17/20:


Voiding trial


Monitor closely


Variable moods





11/18/20:


Monitor BP


Encourage PO intake


Dumont cath management





11/19/20:


Encourage participation with therapies


Monitor dumont cath





11/20/20:


Monitor closely


Bladder management





11/21/20:


Monitor closely


Cath maintained?





11/22/20:


Cath in place


Check labs in am


Increased dose of Urecholine





11/23/20:


Monitor bladder function


Increased dose of Urecholine





11/24/20:


Maintain dumont catheter


IRF protocol


Family evaluating DC plans





(1) CVA (cerebral vascular accident)


(2) Dysarthria


(3) Dysphagia


(4) PEG (percutaneous endoscopic gastrostomy) status


(5) Expressive aphasia


(6) Smoker


(7) COPD (chronic obstructive pulmonary disease)


(8) CAD (coronary artery disease)


(9) Hx of CABG


(10) Dumont catheter in place


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











ANDREY BAH DO                Nov 24, 2020 08:33

## 2020-11-24 NOTE — CARDIOLOGY PROGRESS NOTE
Subjective


Date Seen by Provider:  2020


Time Seen by Provider:  09:19


Subjective/Events-last exam


Patient is sitting up at bedside, denies any chest pain or dyspnea.





Objective-Cardiology


Exam


Last Set of Vital Signs





Vital Signs








 20





 06:00 07:21 09:00


 


Temp 36.4  


 


Pulse 83  


 


Resp 18  


 


B/P (MAP) 171/72 (105)  


 


Pulse Ox  94 


 


O2 Delivery   Room Air





Capillary Refill : Less Than 3 Seconds


I&O











Intake and Output 


 


 20





 00:00


 


Intake Total 870 ml


 


Output Total 1025 ml


 


Balance -155 ml


 


 


 


Intake Oral 870 ml


 


Output Urine Total 1025 ml


 


# Bowel Movements 2








General:  Alert, Oriented X3, Cooperative


HEENT:  Atraumatic, PERRLA


Neck:  Supple, No JVD, No Thyromegaly


Lungs:  Clear to Auscultation, Normal Air Movement


Heart:  Regular Rate, Normal S1, Normal S2, No Murmurs


Abdomen:  Normal Bowel Sounds, Soft, No Tenderness, No Hepatosplenomegaly, No 

Masses


Extremities:  No Clubbing, No Cyanosis, No Edema, Normal Pulses, No 

Tenderness/Swelling


Skin:  No Rashes, No Breakdown, No Significant Lesion


Neuro:  Normal Gait, Normal Speech, Strength at 5/5 X4 Ext, Normal Tone, 

Sensation Intact


Psych/Mental Status:  Mental Status NL, Mood NL





A/P-Cardiology


Admission Diagnosis


CVA


Coronary artery disease


Hypertension


Hyperlipidemia





Assessment/Plan


Status post CVA post CABG with residual right hemiparesis, receiving PT OT, on 

Plavix and ASA.





Coronary artery disease status post CABG 3.  Continue to monitor at this time.





Hypertension, controlled, continue to monitor





Hyperlipidemia, monitor lipids





Questionable peripheral arterial disease with ischemic event after her bypass 

has improved after conservative management, continue on Plavix





Diabetes mellitus, followed and managed by primary care physician





Debility, receiving physical therapy





Patient was seen and evaluated with Hedy, examination performed, management 

plan was discussed, agree with the current scribed note, I made few changes to 

the note using Italic font


Patient was seen at bedside, sitting comfortably, no new complaint


Continue on current medication and continue to monitor





Clinical Quality Measures


DVT/VTE Risk/Contraindication:


Risk Factor Score Per Nursin


RFS Level Per Nursing on Admit:  4+=Very High











HEDY BASHIR  2020 09:20


BROOKE SHEA MD              2020 12:00

## 2020-11-24 NOTE — PROGRESS NOTE - UROLOGY
Progress Note-Urology


Progress Notes/Assess & Plan


Progress/Assessment & Plan


TOV TODAY


Final Diagnosis


URINE RETENTION











TAWIL,ELIAS A MD               Nov 24, 2020 10:19

## 2020-11-25 NOTE — CARDIOLOGY PROGRESS NOTE
Subjective


Date Seen by Provider:  2020


Time Seen by Provider:  08:15


Subjective/Events-last exam


Patient sitting up in chair, no new complaints. Denies any chest pain or dy

spnea.


Review of Systems


General:  No Chills, No Night Sweats, No Fatigue, No Malaise, No Appetite, No 

Other


HEENT:  No Head Aches, No Visual Changes, No Eye Pain, No Ear Pain, No 

Dysphasia, No Sinus Congestion, No Post Nasal Drip, No Sore Throat, No Other


Pulmonary:  No Dyspnea, No Cough, No Pleuritic Chest Pain, No Other


Cardiovascular:  No: Chest Pain, Palpitations, Orthopnea, Paroxysmal Noc. 

Dyspnea, Edema, Lt Headedness, Other





Objective-Cardiology


Exam


Last Set of Vital Signs





Vital Signs








 20





 06:07


 


Temp 36.4


 


Pulse 80


 


Resp 18


 


B/P (MAP) 130/59 (82)


 


Pulse Ox 94


 


O2 Delivery Room Air





Capillary Refill : Less Than 3 Seconds


I&O











Intake and Output 


 


 20





 00:00


 


Intake Total 1160 ml


 


Output Total 1435 ml


 


Balance -275 ml


 


 


 


Intake Oral 1160 ml


 


Output Urine Total 1435 ml


 


# Bowel Movements 4








General:  Alert, Oriented X3, Cooperative


HEENT:  Atraumatic, PERRLA


Neck:  Supple, No JVD, No Thyromegaly


Lungs:  Clear to Auscultation, Normal Air Movement


Heart:  Regular Rate, Normal S1, Normal S2, No Murmurs


Abdomen:  Normal Bowel Sounds, Soft, No Tenderness, No Hepatosplenomegaly, No 

Masses


Extremities:  No Clubbing, No Cyanosis, No Edema, Normal Pulses, No 

Tenderness/Swelling


Skin:  No Rashes, No Breakdown, No Significant Lesion


Neuro:  Normal Gait, Normal Speech, Strength at 5/5 X4 Ext, Normal Tone, 

Sensation Intact


Psych/Mental Status:  Mental Status NL, Mood NL





A/P-Cardiology


Admission Diagnosis


CVA


Coronary artery disease


Hypertension


Hyperlipidemia





Assessment/Plan


Status post CVA post CABG with residual right hemiparesis, receiving PT/OT, on 

Plavix and ASA.





Coronary artery disease status post CABG 3.  Continue to monitor at this time.





Hypertension, controlled, continue to monitor





Hyperlipidemia, monitor lipids





Questionable peripheral arterial disease with ischemic event after her bypass 

has improved after conservative management, continue on Plavix





Diabetes mellitus, followed and managed by primary care physician





Debility, receiving physical therapy





Patient was seen and evaluated with Hedy, examination performed, management 

plan was discussed, agree with the current scribed note, I made few changes to 

the note using Italic font


Patient was seen on this I sitting comfortably, no new complaint


Still having some slurred speech


Continue to monitor blood pressure and lipids, no changes are recommended





Clinical Quality Measures


DVT/VTE Risk/Contraindication:


Risk Factor Score Per Nursin


RFS Level Per Nursing on Admit:  4+=Very High











HEDY BASHIR 2020 8:56 am


BROOKE SHEA MD             2020 2:12 pm

## 2020-11-25 NOTE — NUR
ASSISTED ONTO BEDSIDE COMMODE AND VOIDED.  THERAPY CAME IN AND TOOK PT TO THERAPY UNABLE TO 
GET POST VOID SCAN AT THIS TIME.  @1530 ASSISTED PT BACK ONTO BEDSIDE COMMODE PT VOIDED 
AGAIN, POST VOID BLADDER SCAN 0CC. TAWIL HERE, UPDATED HIM.

## 2020-11-25 NOTE — PHYSICAL THERAPY DAILY NOTE
PT Daily Note-Current


Subjective


Pt. pleasant, "ok baby" smiling at therapist.





Pain





   Location:  No Pain Reported





Mental Status


Patient Orientation:  Non-Verbal/Aphasic


Attachments:  Other-See Comments (mask)





Transfers


SCALE: Activities may be completed with or without assistive devices.





6-Indepedent-patient completes the activity by him/herself with no assistance 

from a helper.


5-Set-up or Clean-up Assistance-helper sets up or cleans up; patient completes 

activity. [place] assists only prior to or  


    following the activity.


4-Supervision or Touching Assistance-helper provides verbal cues and/or 

touching/steadying and/or contact guard assistance as patient completes 

activity. Assistance may be provided   


    throughout the activity or intermittently.


3-Partial/Moderate Assistance-helper does LESS THAN HALF the effort. [place] 

lifts, holds or supports trunk or limbs, but provides less than half the effort.


2-Substantial/Maximal Assistance-helper does MORE THAN HALF the effort. [place] 

lifts or holds trunk or limbs and provides more than half the effort.


5-Jdsdfajzy-zukiuz does ALL the effort. Patient does none of the effort to 

complete the activity. Or, the assistance of 2 or more helpers is required for 

the patient to complete the  


    activity.


If activity was not attempted, code reason:


7-Patient Refused.


9-Not Applicable-not attempted and the patient did not perform the activity 

before the current illness, exacerbation or injury.


10-Not Attempted due to Environmental Limitations-(lack of equipment, weather 

restraints, etc.).


88-Not Attempted due to Medical Conditions or Safety Concerns.


Sit to Stand (QC):  4


Chair/Bed-to-Chair Xfer(QC):  4


SPTs recliner to w/c to nustep to w/c to recliner all min to CGA going toward 

left or right





Weight Bearing


Right Lower Extremity:  Right


Full Weight Bearing


Left Lower Extremity:  Left


Full Weight Bearing





Wheelchair Training


Does the Pt Use a Wheelchair?:  Yes


Type of Wheelchair:  Manual


[id number], SBA to CGA and instruction logan for braking and turns





Exercises


NuStep Minutes:  8


 NuStep Workload:  1





Treatments


took 2 min break during Nustep





Assessment


Current Status:  Good Progress





PT Short Term Goals


Short Term Goals


Time Frame:  2020


Roll Left & Right:  4


Sit to lyin (met)


Lying to sitting on side of be:  3


Chair/bed-to-chair transfer:  3 (met)


Walk 10 feet:  3


Does pt use a wc or scooter:  Yes


Wheel 50ft w/2 turns:  3


Wheel 150 feet:  3





PT Long Term Goals


Long Term Goals


PT Long Term Goals Time Frame:  Dec 3, 2020


Roll Left & Right (QC):  6


Sit to Lying (QC):  6


Lying-Sitting on Side/Bed(QC):  6


Sit to Stand (QC):  4


Chair/Bed-to-Chair Xfer(QC):  4


Toilet Transfer (QC):  4


Car Transfer (QC):  4


Does the Patient Walk:  Yes


Walk 10 feet (QC):  3


Walk 50ft with 2 Turns (QC):  3


Walk 150 ft (QC):  88


Walking 10ft on Uneven Surface:  3


1 Step (curb) (QC):  3


4 Steps (QC):  88


12 Steps (QC):  88


Picking up an Object (QC):  88


Does the Pt use WC or Scooter?:  Yes


Wheel 50 feet with 2 turns (QC:  6


Wheel 150 feet:  6





PT Plan


Treatment/Plan


Treatment Plan:  Continue Plan of Care


Treatment Plan:  Bed Mobility, Education, Functional Activity Nona, Functional 

Strength, Group Therapy, Gait, Safety, Therapeutic Exercise, Transfers


Treatment Duration:  2020


Frequency:  At least 5 of 7 days/Wk (IRF)


Estimated Hrs Per Day:  1.5 hours per day


Patient and/or Family Agrees t:  Yes





Safety Risks/Education


Patient Education:  Transfer Techniques, Correct Positioning, W/C Management


Teaching Recipient:  Patient


Teaching Methods:  Demonstration, Discussion


Response to Teaching:  Verbalize Understanding, Return Demonstration, 

Reinforcement Needed





Time/GCodes


Time In:  1320


Time Out:  1350


Total Billed Treatment Time:  30


Total Billed Treatment


1,FA20m,EX10m











ARRON WHATLEY PTA             2020 13:55

## 2020-11-25 NOTE — OCCUPATIONAL THER DAILY NOTE
OT Current Status-Daily Note


Subjective


No pain reported.





Appearance


Pt. up in chair, holding coffee cup in left hand.  Alert and agrees to work with

OT.





Mental Status/Objective


Patient Orientation:  Person





ADL-Treatment


Therapy Code Descriptions/Definitions 





Functional [place] Measure:


0=Not Assessed/NA        4=Minimal Assistance


1=Total Assistance        5=Supervision or Setup


2=Maximal Assistance  6=Modified [place]


3=Moderate Assistance 7=Complete IndependenceSCALE: Activities may be completed 

with or without assistive devices.





6-Indepedent-patient completes the activity by him/herself with no assistance 

from a helper.


5-Set-up or Clean-up Assistance-helper sets up or cleans up; patient completes 

activity. [place] assists only prior to or  


    following the activity.


4-Supervision or Touching Assistance-helper provides verbal cues and/or 

touching/steadying and/or contact guard assistance as patient completes 

activity. Assistance may be provided   


    throughout the activity or intermittently.


3-Partial/Moderate Assistance-helper does LESS THAN HALF the effort. [place] 

lifts, holds or supports trunk or limbs, but provides less than half the effort.


2-Substantial/Maximal Assistance-helper does MORE THAN HALF the effort. [place] 

lifts or holds trunk or limbs and provides more than half the effort.


7-Nwhisoekd-hopupj does ALL the effort. Patient does none of the effort to 

complete the activity. Or, the assistance of 2 or more helpers is required for 

the patient to complete the  


    activity.


If activity was not attempted, code reason:


7-Patient Refused.


9-Not Applicable-not attempted and the patient did not perform the activity 

before the current illness, exacerbation or injury.


10-Not Attempted due to Environmental Limitations-(lack of equipment, weather 

restraints, etc.).


88-Not Attempted due to Medical Conditions or Safety Concerns.


Oral Hygiene (QC):  4 (SBA and constant cues to brush teeth.  Pt. picks up brush

at sink level seated.  Does not know what to do with it.  Does not initiate 

turning on water or picking up paste.  Pt. puts brush in her mouth.  OT turns on

water and places tooth paste on brush.  Pt. brushes teeth, but continues to 

require cues to spit and then rinse mouth.)


Shower/Bathe Self (QC):  2 (Pt. agrees to shower.  She is able to wash chest and

upper legs.  OT washes all other parts for her.  Noted pt. incontinent of stool.

 Dependent for aurelio care.)


Upper Body Dressing (QC):  2 (Pt. is unable to process steps and cues of how to 

sequence steps of doffing/donning shirt.)


Lower Body Dressing (QC):  2 (Pt. requires max assist to don brief and pants.)


On/Off Footwear:  2


Toileting Hygiene (QC):  1





Other Treatment


Pt. agrees to shower.  Noted pt. has made improvement in all transfers.  Pt. 

able to stand and pivot to wheelchair with min assist.  Taken to bathroom where 

pt. completed ADLs in shower and then oral care at sink.  Pt. then taken to 

therapy gym.  Encouraged pt. to participate in bilateral UE activities for 

crossing midline, addressing right side, visually looking to right, and 

following cues.  Pt. is able to participate with max cues and [place] assist at 

times.  Pt. is able to use right UE, but movement is not purposeful.  Pt. taken 

back to room and transfers to reclining chair with min assist.  All needs met.





Education


OT Patient Education:  Correct positioning, Exercise program, Modified ADL 

techniques, Progress toward Goal/Update tx plan, Purpose of tx/functional 

activities, Reviewed precautions, Rehab process, Transfer techniques


Teaching Recipient:  Patient


Teaching Methods:  Demonstration, Discussion


Response to Teaching:  Verbalize Understanding, Reinforcement Needed





OT Short Term Goals


Short Term Goals


Time Frame:  2020


Eating:  3


Oral hygiene:  3


Toileting hygiene:  3


Shower/bathe self:  3


Upper body dressin


Lower body dressing:  3


Putting on/taking off footwear:  3





OT Long Term Goals


Long Term Goals


Time Frame:  Dec 10, 2020


Eating (QC):  4


Oral Hygiene (QC):  4


Toileting Hygiene (QC):  3


Shower/Bathe Self (QC):  3


Upper Body Dressing (QC):  5


Lower Body Dressing (QC):  4


On/Off Footwear (QC):  4


Additional Goals:  1-Demonstrate ADL Tasks, 2-Verbalize Understanding, 3-

ImproveStrength/Nona


1=Demonstrate adherence to instructed precautions during ADL tasks.


2=Patient will verbalize/demonstrate understanding of assistive 

devices/modifications for ADL.


3=Patient will improve strength/tolerance for activity to enable patient to 

perform ADL's.





OT Education/Plan


Problem List/Assessment


Assessment:  Decreased Activ Tolerance, Decreased UE Strength, Impaired 

Coordination, Impaired Funct Balance, Impaired I ADL's, Impaired Self-Care 

Skills, Restricted Funct UE ROM, Visual-Perceptual Deficit


Visual perceptual issues difficult to pinpoint due to severe global aphasia.





Discharge Recommendations


Plan/Recommendations:  Continue POC


Therapy Discharge Recommendati:  Scheduled Assistance, Home & Family, Post Acute

OT





Treatment Plan/Plan of Care


Treatment,Training & Education:  Yes


Patient would benefit from OT for education, treatment and training to promote 

independence in ADL's, mobility, safety and/or upper extremity function for 

ADL's.


Plan of Care:  ADL Retraining, Caregiver Training, Cognitive Retraining, 

Functional Mobility, Group Exercise/Act as Ind, UE Funct Exercise/Act, UE 

Neuromus Re-Ed/Coord, Visual/Perceptual Retrain, W/C Management Training


Treatment Duration:  Dec 10, 2020


Frequency:  At least 5 of 7 days/Wk (IRF)


Estimated Hrs Per Day:  1.5 hours per day


Agreement:  Yes


Rehab Potential:  Fair





Time/GCodes


Start Time:  09:00


Stop Time:  10:15


Total Time Billed (hr/min):  75


Billed Treatment Time


1, ADL x 45minutes, FA x 30minutes











ROSANNA LOVING OT           2020 12:47

## 2020-11-25 NOTE — PHYSICAL THERAPY DAILY NOTE
PT Daily Note-Current


Subjective


Pt sitting in recliner upon arrival.  Pt agrees to PT.





Pain





   Location:  No Pain Reported





Mental Status


Patient Orientation:  Person, Place





Transfers


SCALE: Activities may be completed with or without assistive devices.





6-Indepedent-patient completes the activity by him/herself with no assistance 

from a helper.


5-Set-up or Clean-up Assistance-helper sets up or cleans up; patient completes 

activity. [place] assists only prior to or  


    following the activity.


4-Supervision or Touching Assistance-helper provides verbal cues and/or 

touching/steadying and/or contact guard assistance as patient completes 

activity. Assistance may be provided   


    throughout the activity or intermittently.


3-Partial/Moderate Assistance-helper does LESS THAN HALF the effort. [place] 

lifts, holds or supports trunk or limbs, but provides less than half the effort.


2-Substantial/Maximal Assistance-helper does MORE THAN HALF the effort. [place] 

lifts or holds trunk or limbs and provides more than half the effort.


2-Spousiooc-kpcpcy does ALL the effort. Patient does none of the effort to 

complete the activity. Or, the assistance of 2 or more helpers is required for 

the patient to complete the  


    activity.


If activity was not attempted, code reason:


7-Patient Refused.


9-Not Applicable-not attempted and the patient did not perform the activity 

before the current illness, exacerbation or injury.


10-Not Attempted due to Environmental Limitations-(lack of equipment, weather 

restraints, etc.).


88-Not Attempted due to Medical Conditions or Safety Concerns.


Sit to Stand (QC):  5





Weight Bearing


Right Lower Extremity:  Right


Full Weight Bearing


Left Lower Extremity:  Left


Full Weight Bearing





Wheelchair Training


Does the Pt Use a Wheelchair?:  Yes


Wheel 50 ft with 2 turns (QC):  5


Wheel 150 ft (QC):  5


Type of Wheelchair:  Manual





Exercises


Seated Therapy Exercises:  Ankle pumps, Long arc quads, Hip flexion, Kicking 

activity, Glut set


Seated Reps:  15


NuStep Minutes:  5


 NuStep Workload:  2





Treatments


TF from recliner to [facility] via SPT.  Propels [facility] in hallway and to Therapy Gym.  

Uses NuStep until fatigued then TF back to [facility].  Pt completes Seated Ex then 

returns to room via [facility].  TF to recliner and resting at end of tx, all needs met

& call light in hand.





Assessment


Current Status:  Good Progress


Pt is more independent with TF including SPT from chair to [facility] and back.





PT Short Term Goals


Short Term Goals


Time Frame:  2020


Roll Left & Right:  4


Sit to lyin (met)


Lying to sitting on side of be:  3


Chair/bed-to-chair transfer:  3 (met)


Walk 10 feet:  3


Does pt use a wc or scooter:  Yes


Wheel 50ft w/2 turns:  3


Wheel 150 feet:  3





PT Long Term Goals


Long Term Goals


PT Long Term Goals Time Frame:  Dec 3, 2020


Roll Left & Right (QC):  6


Sit to Lying (QC):  6


Lying-Sitting on Side/Bed(QC):  6


Sit to Stand (QC):  4


Chair/Bed-to-Chair Xfer(QC):  4


Toilet Transfer (QC):  4


Car Transfer (QC):  4


Does the Patient Walk:  Yes


Walk 10 feet (QC):  3


Walk 50ft with 2 Turns (QC):  3


Walk 150 ft (QC):  88


Walking 10ft on Uneven Surface:  3


1 Step (curb) (QC):  3


4 Steps (QC):  88


12 Steps (QC):  88


Picking up an Object (QC):  88


Does the Pt use WC or Scooter?:  Yes


Wheel 50 feet with 2 turns (QC:  6


Wheel 150 feet:  6





PT Plan


Problem List


Problem List:  Activity Tolerance, Functional Strength





Treatment/Plan


Treatment Plan:  Continue Plan of Care


Treatment Plan:  Bed Mobility, Education, Functional Activity Nona, Functional 

Strength, Group Therapy, Gait, Safety, Therapeutic Exercise, Transfers


Treatment Duration:  2020


Frequency:  At least 5 of 7 days/Wk (IRF)


Estimated Hrs Per Day:  1.5 hours per day


Patient and/or Family Agrees t:  Yes





Safety Risks/Education


Patient Education:  Transfer Techniques, Correct Positioning, W/C Management, 

Safety Issues


Teaching Recipient:  Patient


Teaching Methods:  Discussion


Response to Teaching:  Verbalize Understanding





Time/GCodes


Time In:  1100


Time Out:  1145


Total Billed Treatment Time:  45


Total Billed Treatment


1, [facility] (15m), EX (20m) & FA (10m)











PATEL WELLS PTA              2020 11:55

## 2020-11-25 NOTE — SPEECH THERAPY DAILY NOTE
Speech Daily Progress Note


Subjective


Date Seen by Provider:  Nov 25, 2020


Time Seen by Provider:  00:30


Patient was sitting up in her recliner drinking coffee when I entered her room. 

Patient was alert and participated well.





Objective


Patient completed a series of "what's wrong with this picture?" with scenarios 

she may encounter with 80% given maximum cues.





Assessment


Assessment Current Status:  Good Progress





Treatment Plan


Continue Plan of Care





Speech Short Term Goals


Short Term Goals


Short Term Goals


1) The patient will complete cognitive tasks related to memory, safety awareness

and problem solving at 80% with minimal cues.


2) The patient will complete speech tasks  to improve intelligibility at 80% 

with minimal cues.


3) The patient will complete confrontational naming tasks at 90% with minimal 

cues.


4) The patient will complete OME for improved oral status for safe oral intake 

at 80% or greater with minimal cues.





Speech Long Term Goals


Long Term Goals


Patient will improve communication abilities and safe oral intake in order to 

return to prior level.





Speech-Plan


Patient/Family Goals


Patient/Family Goals:  


Patient will be discharging to live with her daughter as planned.





Treatment Plan


Speech Therapy Treatment Plan:  Continue Plan of Care


Treatment Duration:  Nov 25, 2020


Frequency:  4 times per week (Patient will receive ST 4-5x per week)


Estimated Hrs Per Day:  .5 hour per day


Rehab Potential:  Fair


Barriers to Learning:  


Patient's recent CVA, expressive aphasia


Pt/Family Agrees to Plan:  Yes





Safety Risks/Education


Teaching Recipient:  Patient


Teaching Methods:  Demonstration, Discussion


Response to Teaching:  Verbalize Understanding, Return Demonstration


Education Topics Provided:  


Continued safety upon her discharge





Time


Speech Therapy Time In:  08:30


Speech Therapy Time Out:  09:00


Total Billed Time:  30


Billed Treatment Time


ROMIE Guevara SLTS No [place], BETHANIA ST            Nov 25, 2020 11:00

## 2020-11-25 NOTE — PROGRESS NOTE - UROLOGY
Progress Note-Urology


Progress Notes/Assess & Plan


Progress/Assessment & Plan


VOIDING WELL ON HER STU. EMPTIES WELL. TOLERATES MEDS WELL


Final Diagnosis


RETENTION (RESOLVING)











TAWIL,ELIAS A MD               Nov 25, 2020 17:09

## 2020-11-25 NOTE — PM&R PROGRESS NOTE
Subjective


HPI/CC On Admission


Date Seen by Provider:  Nov 25, 2020


Time Seen by Provider:  09:30


Subjective/Events-last exam


11/25/20:


Pt doing pretty well


Dumont is out, will evaluate whether she can void or not on her own


Urocholine is at 50mg AC and HS


Bowels are really moving well








11/24/20:


Pt doing pretty well 


Re-inserted dumont due to retention 


Yeast infection will be managed with Diflucan and Monistat cream








11/23/20:


Pt still doing pretty well 


Participation in therapy varies 


Cognition is an issue 


Overall doing well enough to remain stable 








11/22/20:


Dumont cath still in place


Urecholine 50mg dose now


BM today








11/21/20:


Likely will need catheter replaced


No pain reported


Feeder maintained adequate consumption








11/20/20:


Patient about the same


Requires a feeder to eat


No pain reported


Confusion is a limitation








11/19/20:


Pt has varying participation with PT and OT 


Accu cheks changed to BID 


Will monitor pt closely 


Denies any pain 


Is a feeder








11/18/20:


Re inserted the dumont since urinary retention continued


Increasing oral fluids


Urecholine was increased dose wise


Bowels are moving








11/17/20:


Xanax given last night 


Bowels moved yesterday 


Refusing to eat supper or breakfast today 


Agitated a bit today 


Post-void residual had 238


Hadnt voided since discontinued the cath yesterday 








11/16/20:


Dr. Tawil will discontinue the catheter and evaluate voiding trial today 


Flomax and Urecholine maintained 


Bowels are moving 


Very difficult to recover from catastrophic stroke








11/15/20:


Patient participating with therapy


No pain reported


Dr Tawil consulted for dumont catheter








11/14/20:


Patient denies pain


Working well with structured therapies


Dumont cath still in place will need to DC soon


Working on BM regimen








Patient doing well


Settling in well


No pain reported


Working with PT OT and ST


Eating a bit more without aspiration


No falls


Checked meds and labs


Conferred with RN


Reviewed therapy notes





Review of Systems


General:  Fatigue, Malaise


Neurological:  Weakness, Incoordination, Confusion





Objective


Exam


Vital Signs





Vital Signs








  Date Time  Temp Pulse Resp B/P (MAP) Pulse Ox O2 Delivery O2 Flow Rate FiO2


 


11/25/20 20:30     95 Room Air  


 


11/25/20 16:00 36.3 67 14 134/61 (85)    





Capillary Refill : Less Than 3 Seconds


General Appearance:  No Apparent Distress, Anxious, Chronically ill, Thin


HEENT:  PERRL/EOMI, Normal ENT Inspection, Pharynx Normal


Neck:  Full Range of Motion, Normal Inspection, Non Tender, Supple, Carotid 

Bruit


Respiratory:  Chest Non Tender, Lungs Clear, No Accessory Muscle Use, No 

Respiratory Distress, Decreased Breath Sounds


Cardiovascular:  Regular Rate, Rhythm, No Edema, No Gallop, No JVD, No Murmur, 

Normal Peripheral Pulses


Gastrointestinal:  Normal Bowel Sounds, No Organomegaly, No Pulsatile Mass, Non 

Tender, Soft


Back:  Normal Inspection, No CVA Tenderness, No Vertebral Tenderness


Extremity:  Normal Capillary Refill, Normal Inspection, Normal Range of Motion 

(except right sided weakness), Non Tender, No Calf Tenderness, No Pedal Edema


Neurologic/Psychiatric:  Alert, No Motor/Sensory Deficits, Normal Mood/Affect, 

Abnormal CNs II-XII, Aphasia, Facial Droop, Motor Weakness (right sided flaccidi

ty)


Skin:  Normal Color, Warm/Dry


Lymphatic:  No Adenopathy





Results/Procedures


Lab


Patient resulted labs reviewed.





FIM


Transfers


Therapy Code Descriptions/Definitions 





Functional [place] Measure:


0=Not Assessed/NA        4=Minimal Assistance


1=Total Assistance        5=Supervision or Setup


2=Maximal Assistance  6=Modified [place]


3=Moderate Assistance 7=Complete IndependenceSCALE: Activities may be completed 

with or without assistive devices.





6-Indepedent-patient completes the activity by him/herself with no assistance 

from a helper.


5-Set-up or Clean-up Assistance-helper sets up or cleans up; patient completes 

activity. [place] assists only prior to or  


    following the activity.


4-Supervision or Touching Assistance-helper provides verbal cues and/or 

touching/steadying and/or contact guard assistance as patient completes 

activity. Assistance may be provided   


    throughout the activity or intermittently.


3-Partial/Moderate Assistance-helper does LESS THAN HALF the effort. [place] 

lifts, holds or supports trunk or limbs, but provides less than half the effort.


2-Substantial/Maximal Assistance-helper does MORE THAN HALF the effort. [place] 

lifts or holds trunk or limbs and provides more than half the effort.


0-Tkarcboqg-zdpekl does ALL the effort. Patient does none of the effort to 

complete the activity. Or, the assistance of 2 or more helpers is required for 

the patient to complete the  


    activity.


If activity was not attempted, code reason:


7-Patient Refused.


9-Not Applicable-not attempted and the patient did not perform the activity 

before the current illness, exacerbation or injury.


10-Not Attempted due to Environmental Limitations-(lack of equipment, weather 

restraints, etc.).


88-Not Attempted due to Medical Conditions or Safety Concerns.


Roll Left to Right (QC):  2


Sit to Lying (QC):  4 (CGA with cues for sequencing and safety. )


Sit to Stand (QC):  4


Chair/Bed-to-Chair Xfer(QC):  3 (Performed x 3 reps. )


Car Transfer (QC):  3





Gait Training


Does the Patient Walk?:  Yes


Distance:  50'


Walk 10 feet (QC):  3


Walk 50 ft with 2 Turns(QC):  3


Walk 150 ft (QC):  88


Walking 10ft/uneven surface-QC:  88


Gait Persons Needed:  1


Gait Assistive Device:  Walker Platform





Wheelchair Training


Distance:  150'x3


Wheel 50 ft with 2 turns (QC):  3 (Cues for task completing and min assist to 

propel)


Wheel 150 ft (QC):  2





Stair Training


1 Step (curb) (QC):  88


4 Steps (QC):  88


12 Steps (QC):  88





Balance


Picking up an Object (QC):  88





ADL-Treatment


Eating (QC):  5


Oral Hygiene (QC):  4 (SBA and constant cues to brush teeth while seated at 

sink.)


Bathing Location:  L Arm, L Upper Leg, R Upper Leg, Chest, Abdomen


Shower/Bathe Self (QC):  2 (Max assist overall.  Pt. is able to wash her chest 

and top of her legs.  OT washes under each arm and under breasts, as well as 

rear aurelio area and bilateral feet.  Pt. is cued to wash parts, but due to 

aphasia, is not always able to understand.)


Upper Body Dressing (QC):  2 (Max assist to doff and don shirt.)


Lower Body Dressing (QC):  2 (Max assist to don brief and pants.)


On/Off Footwear (QC):  1 (Dependent to don socks and slippers.)


Toileting Hygiene (QC):  2


Toilet Transfer (QC):  4 (CGA)





Assessment/Plan


Assessment and Plan


Assess & Plan/Chief Complaint


Assessment per Luke Bainbridge, MSIII:


Critical illness myopathy


S/P CVA involving left occipital artery


S/P 3 vessel CABG


Debility


Expressive aphasia


Dysarthria


Right sided weakness


Diabetes mellitus


COPD


CAD


Dysphagia requiring PEG


Dumnot cath in place





Plan:


1. Ensure adequate anticoagulation


2. PT, OT, and SLP eval's and treat


3. Oxygen via NC- wean as tolerated


4. Resume Home medications


5. Fall precautions


6. Dietary consult


7. DVT prophylaxis





11/13/20:


Patient participating well


No pain reported


Continue current meds


Advance diet





11/14/20:


Dumont DC soon


Continue home meds


Therapies to continue protocol





11/15/20:


Dr Tawil appreciated


Monitor BP


Monitor labs





11/16/20:


Bladder meds


Appreciate Dr Tawil


PT OT ST





11/17/20:


Voiding trial


Monitor closely


Variable moods





11/18/20:


Monitor BP


Encourage PO intake


Dumont cath management





11/19/20:


Encourage participation with therapies


Monitor dumont cath





11/20/20:


Monitor closely


Bladder management





11/21/20:


Monitor closely


Cath maintained?





11/22/20:


Cath in place


Check labs in am


Increased dose of Urecholine





11/23/20:


Monitor bladder function


Increased dose of Urecholine





11/24/20:


Maintain dumont catheter


IRF protocol


Family evaluating DC plans





11/25/20:


DC next week


Dumont cath in place now


Monitor for falls





(1) CVA (cerebral vascular accident)


(2) Dysarthria


(3) Dysphagia


(4) PEG (percutaneous endoscopic gastrostomy) status


(5) Expressive aphasia


(6) Smoker


(7) COPD (chronic obstructive pulmonary disease)


(8) CAD (coronary artery disease)


(9) Hx of CABG


(10) Dumont catheter in place


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











ANDREY BAH DO                Nov 25, 2020 07:09

## 2020-11-25 NOTE — NUR
CM/SS PATIENT CARE CONFERENCE

Reviewed Summary with patient and provided a copy to her children via email.  All are in 
agreement to the target discharge date of Friday, December 4, 2020.



HHC:  Will be arranged with patient/family choice agency in service area for RN PT OT .



DME:  Writer partnering with [employer] for wheelchair through Medicare and [facility] with [employer] private pay.  Daughter Monik informed writer today they already have the BSC and 
tub transfer bench.



Monik has communicated with resources for private pay caregivers without anything 
finalized.



Continue post hospital care planning.

## 2020-11-26 NOTE — PHYSICAL THERAPY DAILY NOTE
PT Daily Note-Current


Subjective


Pt agreeable to PT.  Smiles.  Converses as she is able.





Transfers


SCALE: Activities may be completed with or without assistive devices.





6-Indepedent-patient completes the activity by him/herself with no assistance 

from a helper.


5-Set-up or Clean-up Assistance-helper sets up or cleans up; patient completes 

activity. [place] assists only prior to or  


    following the activity.


4-Supervision or Touching Assistance-helper provides verbal cues and/or 

touching/steadying and/or contact guard assistance as patient completes 

activity. Assistance may be provided   


    throughout the activity or intermittently.


3-Partial/Moderate Assistance-helper does LESS THAN HALF the effort. [place] 

lifts, holds or supports trunk or limbs, but provides less than half the effort.


2-Substantial/Maximal Assistance-helper does MORE THAN HALF the effort. [place] 

lifts or holds trunk or limbs and provides more than half the effort.


1-Bujttpmfw-lpyhan does ALL the effort. Patient does none of the effort to 

complete the activity. Or, the assistance of 2 or more helpers is required for 

the patient to complete the  


    activity.


If activity was not attempted, code reason:


7-Patient Refused.


9-Not Applicable-not attempted and the patient did not perform the activity 

before the current illness, exacerbation or injury.


10-Not Attempted due to Environmental Limitations-(lack of equipment, weather 

restraints, etc.).


88-Not Attempted due to Medical Conditions or Safety Concerns.


Lying to Sitting/Side of Bed(Q:  4 (SBA for safety but pt did not require VC)


Sit to Stand (QC):  4 (CGA and heavy cues for hand placment and sequencig to 

maintain safety.)


Chair/Bed-to-Chair Xfer(QC):  4 (CGA--reaches across chair to stabilize and 

turn.)





Weight Bearing


Right Lower Extremity:  Right


Full Weight Bearing


Left Lower Extremity:  Left


Full Weight Bearing





Gait Training


Distance:  30 ft x 2


Gait Assistive Device:  Walker Platform


min assist with gait; CGA at gait belt and min assist to propel the walker; cues

and assist to keep the walker going straight, cues to assist pt to stay inside 

the walker and for gait pattern and posture.





Exercises


Seated Therapy Exercises:  Ankle pumps, Long arc quads


Seated Reps:  15





Treatments


Co treat with OT.  Need for skill of 2 clinicians to address UE use for ADL 

completion as well as standing static/dynamic balance; functional sit to stand 

transfers; seated balance;  Pt requires heavy cues for safety and sequencing.  

Pt performed dressing and light ADL care this visit and as OT addressed ADL 

activities, PT addressed the upright balance, safety, sequencing and completion 

of gross motor tasks.  Pt often tries to go too fast and is at risk for falling 

therefore, 2 skilled clinicians can safely and effectively facilitate tasks in a

controlled manner.  


Pt also worked on WC mobility and as OT addressed the cognitive aspect of 

sequencing and task breakdown, PT demonstrated use of LE and UE to propel.  


Pt in recliner with legs elevated, call light and chair alarm activited post 

treatment.





Assessment


Current Status:  Good Progress


Pt seemed to tire as the treatment went on with decreased attention to safety.  

However, SPT progressing, gait progressing and functional balance as she 

completes ADL's progressing.





PT Short Term Goals


Short Term Goals


Time Frame:  2020


Roll Left & Right:  4


Sit to lyin (met)


Lying to sitting on side of be:  3


Chair/bed-to-chair transfer:  3 (met)


Walk 10 feet:  3 (met)


Does pt use a wc or scooter:  Yes


Wheel 50ft w/2 turns:  3


Wheel 150 feet:  3





PT Long Term Goals


Long Term Goals


PT Long Term Goals Time Frame:  Dec 3, 2020


Roll Left & Right (QC):  6


Sit to Lying (QC):  6


Lying-Sitting on Side/Bed(QC):  6


Sit to Stand (QC):  4


Chair/Bed-to-Chair Xfer(QC):  4


Toilet Transfer (QC):  4


Car Transfer (QC):  4


Does the Patient Walk:  Yes


Walk 10 feet (QC):  3


Walk 50ft with 2 Turns (QC):  3


Walk 150 ft (QC):  88


Walking 10ft on Uneven Surface:  3


1 Step (curb) (QC):  3


4 Steps (QC):  88


12 Steps (QC):  88


Picking up an Object (QC):  88


Does the Pt use WC or Scooter?:  Yes


Wheel 50 feet with 2 turns (QC:  6


Wheel 150 feet:  6





PT Plan


Problem List


Problem List:  Activity Tolerance, Functional Strength, Safety, Balance, Gait, 

Transfer, Bed Mobility





Treatment/Plan


Treatment Plan:  Continue Plan of Care


Treatment Plan:  Bed Mobility, Education, Functional Activity Nona, Functional 

Strength, Group Therapy, Gait, Safety, Therapeutic Exercise, Transfers


Treatment Duration:  2020


Frequency:  At least 5 of 7 days/Wk (IRF)


Estimated Hrs Per Day:  1.5 hours per day


Patient and/or Family Agrees t:  Yes





Safety Risks/Education


Patient Education:  Transfer Techniques, Safety Issues


Teaching Recipient:  Patient


Teaching Methods:  Demonstration, Discussion


Response to Teaching:  Return Demonstration, Reinforcement Needed





Discharge Recommendations


Therapy Discharge Recommendati:  Post Acute PT (HHC PT)





Time/GCodes


Time In:  700


Time Out:  800


Total Billed Treatment Time:  60


Total Billed Treatment


visit


GT 15


WC 15


FA 30


Co treat with OT 60











BENTLEY PIERCE PT             2020 08:01

## 2020-11-26 NOTE — PROGRESS NOTE - UROLOGY
Progress Note-Urology


Progress Notes/Assess & Plan


Progress/Assessment & Plan


CONTINUES VOIDING ON OWN


Final Diagnosis


URINE RETENTION











TAWIL,ELIAS A MD               Nov 26, 2020 10:16

## 2020-11-26 NOTE — PM&R PROGRESS NOTE
Subjective


HPI/CC On Admission


Date Seen by Provider:  Nov 26, 2020


Time Seen by Provider:  09:30


Subjective/Events-last exam


11/26/20:


Voiding well now


No catheter now


Made her way to the commode on her own without help last night and urinated in 

her clothes on top of the commode


No incontinence usually








11/25/20:


Pt doing pretty well


Dumont is out, will evaluate whether she can void or not on her own


Urocholine is at 50mg AC and HS


Bowels are really moving well








11/24/20:


Pt doing pretty well 


Re-inserted dumont due to retention 


Yeast infection will be managed with Diflucan and Monistat cream








11/23/20:


Pt still doing pretty well 


Participation in therapy varies 


Cognition is an issue 


Overall doing well enough to remain stable 








11/22/20:


Dumont cath still in place


Urecholine 50mg dose now


BM today








11/21/20:


Likely will need catheter replaced


No pain reported


Feeder maintained adequate consumption








11/20/20:


Patient about the same


Requires a feeder to eat


No pain reported


Confusion is a limitation








11/19/20:


Pt has varying participation with PT and OT 


Accu cheks changed to BID 


Will monitor pt closely 


Denies any pain 


Is a feeder








11/18/20:


Re inserted the dumont since urinary retention continued


Increasing oral fluids


Urecholine was increased dose wise


Bowels are moving








11/17/20:


Xanax given last night 


Bowels moved yesterday 


Refusing to eat supper or breakfast today 


Agitated a bit today 


Post-void residual had 238


Hadnt voided since discontinued the cath yesterday 








11/16/20:


Dr. Tawil will discontinue the catheter and evaluate voiding trial today 


Flomax and Urecholine maintained 


Bowels are moving 


Very difficult to recover from catastrophic stroke








11/15/20:


Patient participating with therapy


No pain reported


Dr Tawil consulted for dumont catheter








11/14/20:


Patient denies pain


Working well with structured therapies


Dumont cath still in place will need to DC soon


Working on BM regimen








Patient doing well


Settling in well


No pain reported


Working with PT OT and ST


Eating a bit more without aspiration


No falls


Checked meds and labs


Conferred with RN


Reviewed therapy notes





Review of Systems


Neurological:  Weakness, Incoordination, Confusion





Objective


Exam


Vital Signs





Vital Signs








  Date Time  Temp Pulse Resp B/P (MAP) Pulse Ox O2 Delivery O2 Flow Rate FiO2


 


11/26/20 18:38     97 Room Air  


 


11/26/20 17:20 36.5 69 18 107/58 (74)    





Capillary Refill : Less Than 3 Seconds


General Appearance:  No Apparent Distress, Anxious, Chronically ill, Thin


HEENT:  PERRL/EOMI, Normal ENT Inspection, Pharynx Normal


Neck:  Full Range of Motion, Normal Inspection, Non Tender, Supple, Carotid 

Bruit


Respiratory:  Chest Non Tender, Lungs Clear, No Accessory Muscle Use, No 

Respiratory Distress, Decreased Breath Sounds


Cardiovascular:  Regular Rate, Rhythm, No Edema, No Gallop, No JVD, No Murmur, 

Normal Peripheral Pulses


Gastrointestinal:  Normal Bowel Sounds, No Organomegaly, No Pulsatile Mass, Non 

Tender, Soft


Back:  Normal Inspection, No CVA Tenderness, No Vertebral Tenderness


Extremity:  Normal Capillary Refill, Normal Inspection, Normal Range of Motion 

(except right sided weakness), Non Tender, No Calf Tenderness, No Pedal Edema


Neurologic/Psychiatric:  Alert, No Motor/Sensory Deficits, Normal Mood/Affect, 

Abnormal CNs II-XII, Aphasia, Facial Droop, Motor Weakness (right sided fl

accidity)


Skin:  Normal Color, Warm/Dry


Lymphatic:  No Adenopathy





Results/Procedures


Lab


Patient resulted labs reviewed.





FIM


Transfers


Therapy Code Descriptions/Definitions 





Functional [place] Measure:


0=Not Assessed/NA        4=Minimal Assistance


1=Total Assistance        5=Supervision or Setup


2=Maximal Assistance  6=Modified [place]


3=Moderate Assistance 7=Complete IndependenceSCALE: Activities may be completed 

with or without assistive devices.





6-Indepedent-patient completes the activity by him/herself with no assistance 

from a helper.


5-Set-up or Clean-up Assistance-helper sets up or cleans up; patient completes 

activity. [place] assists only prior to or  


    following the activity.


4-Supervision or Touching Assistance-helper provides verbal cues and/or 

touching/steadying and/or contact guard assistance as patient completes 

activity. Assistance may be provided   


    throughout the activity or intermittently.


3-Partial/Moderate Assistance-helper does LESS THAN HALF the effort. [place] 

lifts, holds or supports trunk or limbs, but provides less than half the effort.


2-Substantial/Maximal Assistance-helper does MORE THAN HALF the effort. [place] 

lifts or holds trunk or limbs and provides more than half the effort.


9-Wmbzsrotb-tlugyk does ALL the effort. Patient does none of the effort to 

complete the activity. Or, the assistance of 2 or more helpers is required for 

the patient to complete the  


    activity.


If activity was not attempted, code reason:


7-Patient Refused.


9-Not Applicable-not attempted and the patient did not perform the activity 

before the current illness, exacerbation or injury.


10-Not Attempted due to Environmental Limitations-(lack of equipment, weather 

restraints, etc.).


88-Not Attempted due to Medical Conditions or Safety Concerns.


Roll Left to Right (QC):  2


Sit to Lying (QC):  4 (CGA with cues for sequencing and safety. )


Sit to Stand (QC):  4


Chair/Bed-to-Chair Xfer(QC):  4


Car Transfer (QC):  3





Gait Training


Does the Patient Walk?:  Yes


Distance:  50'


Walk 10 feet (QC):  3


Walk 50 ft with 2 Turns(QC):  3


Walk 150 ft (QC):  88


Walking 10ft/uneven surface-QC:  88


Gait Persons Needed:  1


Gait Assistive Device:  Walker Platform





Wheelchair Training


Does the Pt Use a Wheelchair?:  Yes


Distance:  150'x3


Wheel 50 ft with 2 turns (QC):  5


Wheel 150 ft (QC):  5


Type of Wheelchair:  Manual





Stair Training


1 Step (curb) (QC):  88


4 Steps (QC):  88


12 Steps (QC):  88





Balance


Picking up an Object (QC):  88





ADL-Treatment


Eating (QC):  5


Oral Hygiene (QC):  4 (SBA and constant cues to brush teeth.  Pt. picks up brush

at sink level seated.  Does not know what to do with it.  Does not initiate 

turning on water or picking up paste.  Pt. puts brush in her mouth.  OT turns on

water and places tooth paste on brush.  Pt. brushes teeth, but continues to 

require cues to spit and then rinse mouth.)


Bathing Location:  L Arm, L Upper Leg, R Upper Leg, Chest, Abdomen


Shower/Bathe Self (QC):  2 (Pt. agrees to shower.  She is able to wash chest and

upper legs.  OT washes all other parts for her.  Noted pt. incontinent of stool.

 Dependent for aurelio care.)


Upper Body Dressing (QC):  2 (Pt. is unable to process steps and cues of how to 

sequence steps of doffing/donning shirt.)


Lower Body Dressing (QC):  2 (Pt. requires max assist to don brief and pants.)


On/Off Footwear (QC):  2


Toileting Hygiene (QC):  1


Toilet Transfer (QC):  4 (CGA)





Assessment/Plan


Assessment and Plan


Assess & Plan/Chief Complaint


Assessment per Luke Bainbridge, MSIII:


Critical illness myopathy


S/P CVA involving left occipital artery


S/P 3 vessel CABG


Debility


Expressive aphasia


Dysarthria


Right sided weakness


Diabetes mellitus


COPD


CAD


Dysphagia requiring PEG now eating well


Dumont cath in place now DC and voiding well on Urecholine





Plan:


1. Ensure adequate anticoagulation


2. PT, OT, and SLP eval's and treat


3. Oxygen via NC- wean as tolerated


4. Resume Home medications


5. Fall precautions


6. Dietary consult


7. DVT prophylaxis





11/13/20:


Patient participating well


No pain reported


Continue current meds


Advance diet





11/14/20:


Dumont DC soon


Continue home meds


Therapies to continue protocol





11/15/20:


Dr Tawil appreciated


Monitor BP


Monitor labs





11/16/20:


Bladder meds


Appreciate Dr Tawil


PT OT ST





11/17/20:


Voiding trial


Monitor closely


Variable moods





11/18/20:


Monitor BP


Encourage PO intake


Dumont cath management





11/19/20:


Encourage participation with therapies


Monitor dumont cath





11/20/20:


Monitor closely


Bladder management





11/21/20:


Monitor closely


Cath maintained?





11/22/20:


Cath in place


Check labs in am


Increased dose of Urecholine





11/23/20:


Monitor bladder function


Increased dose of Urecholine





11/24/20:


Maintain dumont catheter


IRF protocol


Family evaluating DC plans





11/25/20:


DC next week


Dumont cath in place now


Monitor for falls





11/26/20:


Voiding well without cath now


Monitor closely





(1) CVA (cerebral vascular accident)


(2) Dysarthria


(3) Dysphagia


(4) PEG (percutaneous endoscopic gastrostomy) status


(5) Expressive aphasia


(6) Smoker


(7) COPD (chronic obstructive pulmonary disease)


(8) CAD (coronary artery disease)


(9) Hx of CABG


(10) Dumont catheter in place


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











ANDREY BAH DO                Nov 26, 2020 06:10

## 2020-11-26 NOTE — NUR
APPEARS TO BE MORE CHEERFUL TODAY. TALKING MORE - STILL HAS EXPRESSIVE APHASIA. DENIES PAIN. 
FLUIDS ENCOURAGED.

## 2020-11-26 NOTE — OCCUPATIONAL THER DAILY NOTE
OT Current Status-Daily Note


Subjective


No pain reported.





Appearance


Pt. in bed.  Agrees to work with therapy.





Mental Status/Objective


Patient Orientation:  Person





ADL-Treatment


Therapy Code Descriptions/Definitions 





Functional [place] Measure:


0=Not Assessed/NA        4=Minimal Assistance


1=Total Assistance        5=Supervision or Setup


2=Maximal Assistance  6=Modified [place]


3=Moderate Assistance 7=Complete IndependenceSCALE: Activities may be completed 

with or without assistive devices.





6-Indepedent-patient completes the activity by him/herself with no assistance 

from a helper.


5-Set-up or Clean-up Assistance-helper sets up or cleans up; patient completes 

activity. [place] assists only prior to or  


    following the activity.


4-Supervision or Touching Assistance-helper provides verbal cues and/or 

touching/steadying and/or contact guard assistance as patient completes 

activity. Assistance may be provided   


    throughout the activity or intermittently.


3-Partial/Moderate Assistance-helper does LESS THAN HALF the effort. [place] 

lifts, holds or supports trunk or limbs, but provides less than half the effort.


2-Substantial/Maximal Assistance-helper does MORE THAN HALF the effort. [place] 

lifts or holds trunk or limbs and provides more than half the effort.


5-Wndbkipuz-vojdjs does ALL the effort. Patient does none of the effort to 

complete the activity. Or, the assistance of 2 or more helpers is required for 

the patient to complete the  


    activity.


If activity was not attempted, code reason:


7-Patient Refused.


9-Not Applicable-not attempted and the patient did not perform the activity 

before the current illness, exacerbation or injury.


10-Not Attempted due to Environmental Limitations-(lack of equipment, weather 

restraints, etc.).


88-Not Attempted due to Medical Conditions or Safety Concerns.


Eating (QC):  4


Oral Hygiene (QC):  4 (SBA for complete set up and constant cues to sequence 

each step of brushing teeth seated at sink.)


Upper Body Dressing (QC):  3 (Mod assist overall.  Pt. able to doff shirt with 

SBA and cues, and able to don shirt with mod assist overall.  Doing better today

with this task.)


Lower Body Dressing (QC):  2 (Max assist to thread bilateral LE into brief and 

pants.  Pt. able to engage right UE more correctly today to attempt to use to 

don over hips in stance.  Overall, requires assistance to thoroughly get over 

hips.)


On/Off Footwear:  2


Toileting Hygiene (QC):  7


Toilet Transfer (QC):  7 (Pt. indicates at begining and end of treatment that 

she does not need to use bathroom.)





Other Treatment


Pt. seen this date for co-treatment with PT due to fatigue and need for higher 

level skilled activities of two therapists.  OT engaged pt. in ADL skills while 

PT facilitated sitting balance and transfers during ADLs.  After pt. dressed, 

brushed hair and teeth, transferred to wheelchair and worked on self propulsion.

 Pt. unable to sequence and understand efficient steps to move feet and left UE 

at same time to push chair.  Engaged her in using feet only and this was 

difficult for her.  Ambulated with walker with wheelchair follow, with min 

assist.  Please see PT note for distance.  Worked on right UE AROM and then 

AAROM activities as well as right LE exercises.  Pt. becomes anxious and 

breathes harder when she is attempting to do something she is not sure of.  

Encouraged her to take deep breathes and rest.  Pt. doing well overall.  No 

other distress noted.  Pt. taken back to room and transfers to chair with all 

needs met and chair alarm set.





Education


OT Patient Education:  Correct positioning, Exercise program, Modified ADL 

techniques, Progress toward Goal/Update tx plan, Purpose of tx/functional 

activities, Reviewed precautions, Rehab process, Transfer techniques


Teaching Recipient:  Patient


Teaching Methods:  Demonstration, Discussion


Response to Teaching:  Verbalize Understanding, Reinforcement Needed





OT Short Term Goals


Short Term Goals


Time Frame:  2020


Eating:  3


Oral hygiene:  3


Toileting hygiene:  3


Shower/bathe self:  3


Upper body dressin


Lower body dressing:  3


Putting on/taking off footwear:  3





OT Long Term Goals


Long Term Goals


Time Frame:  Dec 10, 2020


Eating (QC):  4


Oral Hygiene (QC):  4


Toileting Hygiene (QC):  3


Shower/Bathe Self (QC):  3


Upper Body Dressing (QC):  5


Lower Body Dressing (QC):  4


On/Off Footwear (QC):  4


Additional Goals:  1-Demonstrate ADL Tasks, 2-Verbalize Understanding, 3-

ImproveStrength/Nona


1=Demonstrate adherence to instructed precautions during ADL tasks.


2=Patient will verbalize/demonstrate understanding of assistive 

devices/modifications for ADL.


3=Patient will improve strength/tolerance for activity to enable patient to 

perform ADL's.





OT Education/Plan


Problem List/Assessment


Assessment:  Decreased Activ Tolerance, Decreased Safety Aware, Decreased UE 

Strength, Dependent Transfers, Impaired Bed Mobility, Impaired Cognition, 

Impaired Coordination, Impaired Funct Balance, Impaired I ADL's, Impaired Self-

Care Skills, Restricted Funct UE ROM, Visual-Perceptual Deficit


Visual perceptual issues difficult to pinpoint due to severe global aphasia.





Discharge Recommendations


Plan/Recommendations:  Continue POC


Therapy Discharge Recommendati:  24 Hour Supervision, Home & Family, Post Acute 

OT





Treatment Plan/Plan of Care


Treatment,Training & Education:  Yes


Patient would benefit from OT for education, treatment and training to promote 

independence in ADL's, mobility, safety and/or upper extremity function for 

ADL's.


Plan of Care:  ADL Retraining, Caregiver Training, Cognitive Retraining, 

Functional Mobility, Group Exercise/Act as Ind, UE Funct Exercise/Act, UE 

Neuromus Re-Ed/Coord, Visual/Perceptual Retrain, W/C Management Training


Treatment Duration:  Dec 10, 2020


Frequency:  At least 5 of 7 days/Wk (IRF)


Estimated Hrs Per Day:  1.5 hours per day


Agreement:  Yes


Rehab Potential:  Fair





Time/GCodes


Start Time:  07:00


Stop Time:  08:00


Total Time Billed (hr/min):  60


Billed Treatment Time


1, ADL x 30minutes, FA x 15minutes, Ex x 15minutes











ROSANNA LOVING OT           2020 08:00

## 2020-11-27 NOTE — PM&R PROGRESS NOTE
Subjective


HPI/CC On Admission


Date Seen by Provider:  Nov 27, 2020


Time Seen by Provider:  13:00


Subjective/Events-last exam


11/27/20:


Improved status


Feeding herself now


Dramatic improvement since admit


No incontinence now


DC accucheck BID








11/26/20:


Voiding well now


No catheter now


Made her way to the commode on her own without help last night and urinated in 

her clothes on top of the commode


No incontinence usually








11/25/20:


Pt doing pretty well


Dumont is out, will evaluate whether she can void or not on her own


Urocholine is at 50mg AC and HS


Bowels are really moving well








11/24/20:


Pt doing pretty well 


Re-inserted dumont due to retention 


Yeast infection will be managed with Diflucan and Monistat cream








11/23/20:


Pt still doing pretty well 


Participation in therapy varies 


Cognition is an issue 


Overall doing well enough to remain stable 








11/22/20:


Dumont cath still in place


Urecholine 50mg dose now


BM today








11/21/20:


Likely will need catheter replaced


No pain reported


Feeder maintained adequate consumption








11/20/20:


Patient about the same


Requires a feeder to eat


No pain reported


Confusion is a limitation








11/19/20:


Pt has varying participation with PT and OT 


Accu cheks changed to BID 


Will monitor pt closely 


Denies any pain 


Is a feeder








11/18/20:


Re inserted the dumont since urinary retention continued


Increasing oral fluids


Urecholine was increased dose wise


Bowels are moving








11/17/20:


Xanax given last night 


Bowels moved yesterday 


Refusing to eat supper or breakfast today 


Agitated a bit today 


Post-void residual had 238


Hadnt voided since discontinued the cath yesterday 








11/16/20:


Dr. Tawil will discontinue the catheter and evaluate voiding trial today 


Flomax and Urecholine maintained 


Bowels are moving 


Very difficult to recover from catastrophic stroke








11/15/20:


Patient participating with therapy


No pain reported


Dr Tawil consulted for dumont catheter








11/14/20:


Patient denies pain


Working well with structured therapies


Dumont cath still in place will need to DC soon


Working on BM regimen








Patient doing well


Settling in well


No pain reported


Working with PT OT and ST


Eating a bit more without aspiration


No falls


Checked meds and labs


Conferred with RN


Reviewed therapy notes





Review of Systems


General:  Fatigue





Objective


Exam


Vital Signs





Vital Signs








  Date Time  Temp Pulse Resp B/P (MAP) Pulse Ox O2 Delivery O2 Flow Rate FiO2


 


11/28/20 05:32 37.1 73 16 132/60 (84) 94 Room Air  





Capillary Refill : Less Than 3 Seconds


General Appearance:  No Apparent Distress, Anxious, Chronically ill, Thin


HEENT:  PERRL/EOMI, Normal ENT Inspection, Pharynx Normal


Neck:  Full Range of Motion, Normal Inspection, Non Tender, Supple, Carotid 

Bruit


Respiratory:  Chest Non Tender, Lungs Clear, No Accessory Muscle Use, No 

Respiratory Distress, Decreased Breath Sounds


Cardiovascular:  Regular Rate, Rhythm, No Edema, No Gallop, No JVD, No Murmur, 

Normal Peripheral Pulses


Gastrointestinal:  Normal Bowel Sounds, No Organomegaly, No Pulsatile Mass, Non 

Tender, Soft


Back:  Normal Inspection, No CVA Tenderness, No Vertebral Tenderness


Extremity:  Normal Capillary Refill, Normal Inspection, Normal Range of Motion 

(except right sided weakness), Non Tender, No Calf Tenderness, No Pedal Edema


Neurologic/Psychiatric:  Alert, No Motor/Sensory Deficits, Normal Mood/Affect, 

Abnormal CNs II-XII, Aphasia, Facial Droop, Motor Weakness (right sided 

flaccidity)


Skin:  Normal Color, Warm/Dry


Lymphatic:  No Adenopathy





Results/Procedures


Lab


Patient resulted labs reviewed.





FIM


Transfers


Therapy Code Descriptions/Definitions 





Functional [place] Measure:


0=Not Assessed/NA        4=Minimal Assistance


1=Total Assistance        5=Supervision or Setup


2=Maximal Assistance  6=Modified [place]


3=Moderate Assistance 7=Complete IndependenceSCALE: Activities may be completed 

with or without assistive devices.





6-Indepedent-patient completes the activity by him/herself with no assistance 

from a helper.


5-Set-up or Clean-up Assistance-helper sets up or cleans up; patient completes 

activity. [place] assists only prior to or  


    following the activity.


4-Supervision or Touching Assistance-helper provides verbal cues and/or 

touching/steadying and/or contact guard assistance as patient completes 

activity. Assistance may be provided   


    throughout the activity or intermittently.


3-Partial/Moderate Assistance-helper does LESS THAN HALF the effort. [place] 

lifts, holds or supports trunk or limbs, but provides less than half the effort.


2-Substantial/Maximal Assistance-helper does MORE THAN HALF the effort. [place] 

lifts or holds trunk or limbs and provides more than half the effort.


5-Woqmkmjap-eniufn does ALL the effort. Patient does none of the effort to 

complete the activity. Or, the assistance of 2 or more helpers is required for 

the patient to complete the  


    activity.


If activity was not attempted, code reason:


7-Patient Refused.


9-Not Applicable-not attempted and the patient did not perform the activity 

before the current illness, exacerbation or injury.


10-Not Attempted due to Environmental Limitations-(lack of equipment, weather 

restraints, etc.).


88-Not Attempted due to Medical Conditions or Safety Concerns.


Roll Left to Right (QC):  2


Sit to Lying (QC):  4 (CGA with cues for sequencing and safety. )


Sit to Stand (QC):  4 (CGA and heavy cues for hand placment and sequencig to 

maintain safety.)


Chair/Bed-to-Chair Xfer(QC):  4 (CGA--reaches across chair to stabilize and 

turn.)


Car Transfer (QC):  3





Gait Training


Does the Patient Walk?:  Yes


Distance:  30 ft x 2


Walk 10 feet (QC):  3


Walk 50 ft with 2 Turns(QC):  3


Walk 150 ft (QC):  88


Walking 10ft/uneven surface-QC:  88


Gait Persons Needed:  1


Gait Assistive Device:  Walker Platform





Wheelchair Training


Does the Pt Use a Wheelchair?:  Yes


Distance:  150'x3


Wheel 50 ft with 2 turns (QC):  5


Wheel 150 ft (QC):  5


Type of Wheelchair:  Manual





Stair Training


1 Step (curb) (QC):  88


4 Steps (QC):  88


12 Steps (QC):  88





Balance


Picking up an Object (QC):  88





ADL-Treatment


Eating (QC):  4


Oral Hygiene (QC):  4 (SBA for complete set up and constant cues to sequence 

each step of brushing teeth seated at sink.)


Bathing Location:  L Arm, L Upper Leg, R Upper Leg, Chest, Abdomen


Shower/Bathe Self (QC):  2 (Pt. agrees to shower.  She is able to wash chest and

upper legs.  OT washes all other parts for her.  Noted pt. incontinent of stool.

 Dependent for aurelio care.)


Upper Body Dressing (QC):  3 (Mod assist overall.  Pt. able to doff shirt with 

SBA and cues, and able to don shirt with mod assist overall.  Doing better today

with this task.)


Lower Body Dressing (QC):  2 (Max assist to thread bilateral LE into brief and 

pants.  Pt. able to engage right UE more correctly today to attempt to use to 

don over hips in stance.  Overall, requires assistance to thoroughly get over 

hips.)


On/Off Footwear (QC):  2


Toileting Hygiene (QC):  7


Toilet Transfer (QC):  7 (Pt. indicates at begining and end of treatment that 

she does not need to use bathroom.)





Assessment/Plan


Assessment and Plan


Assess & Plan/Chief Complaint


Assessment per Luke Bainbridge, MSIII:


Critical illness myopathy


S/P CVA involving left occipital artery


S/P 3 vessel CABG


Debility


Expressive aphasia


Dysarthria


Right sided weakness


Diabetes mellitus


COPD


CAD


Dysphagia requiring PEG now eating well


Dumont cath in place now DC and voiding well on Urecholine





Plan:


1. Ensure adequate anticoagulation


2. PT, OT, and SLP eval's and treat


3. Oxygen via NC- wean as tolerated


4. Resume Home medications


5. Fall precautions


6. Dietary consult


7. DVT prophylaxis





11/13/20:


Patient participating well


No pain reported


Continue current meds


Advance diet





11/14/20:


Dumont DC soon


Continue home meds


Therapies to continue protocol





11/15/20:


Dr Tawil appreciated


Monitor BP


Monitor labs





11/16/20:


Bladder meds


Appreciate Dr Tawil


PT OT ST





11/17/20:


Voiding trial


Monitor closely


Variable moods





11/18/20:


Monitor BP


Encourage PO intake


Dumont cath management





11/19/20:


Encourage participation with therapies


Monitor dumont cath





11/20/20:


Monitor closely


Bladder management





11/21/20:


Monitor closely


Cath maintained?





11/22/20:


Cath in place


Check labs in am


Increased dose of Urecholine





11/23/20:


Monitor bladder function


Increased dose of Urecholine





11/24/20:


Maintain dumont catheter


IRF protocol


Family evaluating DC plans





11/25/20:


DC next week


Dumont cath in place now


Monitor for falls





11/26/20:


Voiding well without cath now


Monitor closely





11/27/20:


DC accuchecks


Dramatic improvement since admit





(1) CVA (cerebral vascular accident)


(2) Dysarthria


(3) Dysphagia


(4) PEG (percutaneous endoscopic gastrostomy) status


(5) Expressive aphasia


(6) Smoker


(7) COPD (chronic obstructive pulmonary disease)


(8) CAD (coronary artery disease)


(9) Hx of CABG


(10) Dumont catheter in place


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











ANDREY BAH DO                Nov 27, 2020 07:06

## 2020-11-27 NOTE — PROGRESS NOTE - UROLOGY
Progress Note-Urology


Progress Notes/Assess & Plan


Progress/Assessment & Plan


CONTINUES WELL VOIDING. PLAN START WEANING OFF URECHOLINE


Final Diagnosis


URINE RETENTION ( RESOLVING)











TAWIL,ELIAS A MD               Nov 27, 2020 11:09

## 2020-11-27 NOTE — NUR
RD ASSESSMENT 



PMHx: CAD; DM; COPD; dysphagia; 



PT INTERACTION: Pt was awake and pleasant during nutrition follow-up. Pt states she has been 
eating fair since last assessment. Note avg PO intak3 45% x4d, per chart review. Pt states 
no issues with nausea, vomiting, constipation, or diarrhea since last assessment. Note last 
BM was 11/25, and pt currently on bowel regimen of colace BID, senna BID, and miralax BID, 
per chart review.



ABNORMAL NUTRITION-RELATED LAB VALUES

LOW:  

HIGH: glu 123; 



Est. kcal needs: 2846-6153 kcal | 35-30 kcal/kg  

Est. Pro needs:  56-67 g Pro | 1.0-1.2 g Pro/kg 



PES STATEMENT: Inadequate oral intake (NI-2.1) related to loss of appetite as evidenced by 
pt interview, and avg PO intake 45% x4d. 



INTERVENTION:  

Continue with current diet order of CHO 60g/m 3snack diet, with modifier of DYS2 
Mechanically Altered diet. 

Continue with current supplementation order of Glucerna with meals TID, for increased kcal 
intake. Provides 220 kcal and 10 g Pro per serving. 

Encouraged pt to eat when able. 

Will continue to follow and reassess as pt needs, intake, and status change. 





DOREEN Carney, MS RD LD 

[phone] cell

## 2020-11-27 NOTE — PHYSICAL THERAPY DAILY NOTE
PT Daily Note-Current


Subjective


Pt. up in recliner, tearful and emotional.  Unable to express her feeling except

agrees she is just depressed about her current situation.  Denies pain.





Pain





   Location:  No Pain Reported





Mental Status


Patient Orientation:  Non-Verbal/Aphasic





Transfers


SCALE: Activities may be completed with or without assistive devices.





6-Indepedent-patient completes the activity by him/herself with no assistance 

from a helper.


5-Set-up or Clean-up Assistance-helper sets up or cleans up; patient completes 

activity. [place] assists only prior to or  


    following the activity.


4-Supervision or Touching Assistance-helper provides verbal cues and/or 

touching/steadying and/or contact guard assistance as patient completes activit

y. Assistance may be provided   


    throughout the activity or intermittently.


3-Partial/Moderate Assistance-helper does LESS THAN HALF the effort. [place] 

lifts, holds or supports trunk or limbs, but provides less than half the effort.


2-Substantial/Maximal Assistance-helper does MORE THAN HALF the effort. [place] 

lifts or holds trunk or limbs and provides more than half the effort.


2-Mbdrxgiry-ymluav does ALL the effort. Patient does none of the effort to 

complete the activity. Or, the assistance of 2 or more helpers is required for 

the patient to complete the  


    activity.


If activity was not attempted, code reason:


7-Patient Refused.


9-Not Applicable-not attempted and the patient did not perform the activity 

before the current illness, exacerbation or injury.


10-Not Attempted due to Environmental Limitations-(lack of equipment, weather 

restraints, etc.).


88-Not Attempted due to Medical Conditions or Safety Concerns.


Roll Left & Right (QC):  4


Sit to Lying (QC):  4


Lying to Sitting/Side of Bed(Q:  4


Sit to Stand (QC):  4


Chair/Bed-to-Chair Xfer(QC):  4


Toilet Transfer (QC):  4


mod assist to manage clothing and clean up with toileting





Weight Bearing


Right Lower Extremity:  Right


Full Weight Bearing


Left Lower Extremity:  Left


Full Weight Bearing





Gait Training


Does the Patient Walk?:  Yes


Walk 10 feet (QC):  4


Gait Persons Needed:  1


Gait Assistive Device:  Handheld Assist


HHA right and hallway rail on left 15 ft x 2 , w/c to follow, improving heel 

strike and DF right .





Wheelchair Training


Does the Pt Use a Wheelchair?:  Yes


Wheel 50 ft with 2 turns (QC):  3


Type of Wheelchair:  Manual


requires hand over hand and much instruction repeatedly. Pt. uses feet/LEs for 

mobility and needs mod assist to brake





Exercises


Supine Ex:  Bridging, Ankle pumps, Quad Set, Rolling, Heel Slides, Short Arc 

Quads, Scooting, Straight leg raise (assisted R), Hip abd/add


Supine Reps:  10 (x2)


Seated Therapy Exercises:  Ankle pumps, Sit to stand, Long arc quads, Hip 

flexion


Seated Reps:  15





Treatments


SPTs to left and right, gait in [place]ente, toileting, sit to stands, supine therex 

and sup to sit TRFs, w/c mobility





Assessment


Current Status:  Good Progress


appears dyspneic frequently but O2 sats are >90% on room air, needs rest breaks 

multiple times





PT Short Term Goals


Short Term Goals


Time Frame:  2020


Roll Left & Right:  4


Sit to lyin (met)


Lying to sitting on side of be:  3


Chair/bed-to-chair transfer:  3 (met)


Walk 10 feet:  3 (met)


Does pt use a wc or scooter:  Yes


Wheel 50ft w/2 turns:  3


Wheel 150 feet:  3





PT Long Term Goals


Long Term Goals


PT Long Term Goals Time Frame:  Dec 3, 2020


Roll Left & Right (QC):  6


Sit to Lying (QC):  6


Lying-Sitting on Side/Bed(QC):  6


Sit to Stand (QC):  4


Chair/Bed-to-Chair Xfer(QC):  4


Toilet Transfer (QC):  4


Car Transfer (QC):  4


Does the Patient Walk:  Yes


Walk 10 feet (QC):  3


Walk 50ft with 2 Turns (QC):  3


Walk 150 ft (QC):  88


Walking 10ft on Uneven Surface:  3


1 Step (curb) (QC):  3


4 Steps (QC):  88


12 Steps (QC):  88


Picking up an Object (QC):  88


Does the Pt use WC or Scooter?:  Yes


Wheel 50 feet with 2 turns (QC:  6


Wheel 150 feet:  6





PT Plan


Treatment/Plan


Treatment Plan:  Continue Plan of Care


Treatment Plan:  Bed Mobility, Education, Functional Activity Nona, Functional 

Strength, Group Therapy, Gait, Safety, Therapeutic Exercise, Transfers


Treatment Duration:  2020


Frequency:  At least 5 of 7 days/Wk (IRF)


Estimated Hrs Per Day:  1.5 hours per day


Patient and/or Family Agrees t:  Yes





Safety Risks/Education


Patient Education:  Gait Training, Transfer Techniques, Correct Positioning, W/C

Management, Disease Process, Safety Issues


Teaching Recipient:  Patient


Teaching Methods:  Demonstration, Discussion


Response to Teaching:  Reinforcement Needed





Time/GCodes


Time In:  800


Time Out:  930


Total Billed Treatment Time:  90


Total Billed Treatment


1,GT15m,WC15m,FA30m,EX30m











ARRON WHATLEY PTA             2020 09:32

## 2020-11-27 NOTE — SPEECH THERAPY DAILY NOTE
Speech Daily Progress Note


Subjective


Date Seen by Provider:  Nov 27, 2020


Time Seen by Provider:  00:30


Patient was resting in her recliner following her PT session. She states she is 

a little tired.





Objective


Patient demo safe intake strategies with all intake at 90% with minimal cues.





Assessment


Assessment Current Status:  Fair Progress





Treatment Plan


Continue Plan of Care





Speech Short Term Goals


Short Term Goals


Short Term Goals


1) The patient will complete cognitive tasks related to memory, safety awareness

and problem solving at 80% with minimal cues.


2) The patient will complete speech tasks  to improve intelligibility at 80% 

with minimal cues.


3) The patient will complete confrontational naming tasks at 90% with minimal 

cues.


4) The patient will complete OME for improved oral status for safe oral intake 

at 80% or greater with minimal cues.





Speech Long Term Goals


Long Term Goals


Patient will improve communication abilities and safe oral intake in order to 

return to prior level.





Speech-Plan


Patient/Family Goals


Patient/Family Goals:  


Patient will discharge to live with her daughter.





Treatment Plan


Speech Therapy Treatment Plan:  Continue Plan of Care


Treatment Duration:  Nov 25, 2020


Frequency:  4 times per week (Patient will receive ST 4-5x per week)


Estimated Hrs Per Day:  .5 hour per day


Rehab Potential:  Fair


Barriers to Learning:  


Patient's recent CVA, expressive aphasia


Pt/Family Agrees to Plan:  Yes





Safety Risks/Education


Teaching Recipient:  Patient


Teaching Methods:  Demonstration, Discussion


Response to Teaching:  Verbalize Understanding, Return Demonstration


Education Topics Provided:  


Continued safety within her room, safety of oral intake





Time


Speech Therapy Time In:  09:30


Speech Therapy Time Out:  10:00


Total Billed Time:  30


Billed Treatment Time


1, ROMIE, SLFRANCHESKA Zee            Nov 27, 2020 09:41

## 2020-11-27 NOTE — NUR
Pt refused Colace, Miralax & Senokot, takes pills well one at a time with water & HOB 
straight upright

## 2020-11-27 NOTE — OCCUPATIONAL THER DAILY NOTE
OT Current Status-Daily Note


Subjective


Pt in recliner. Pt no c/o pain. Pt agreed to therapy.





Mental Status/Objective


Patient Orientation:  Person, Place, Time, Situation





ADL-Treatment


Pt transferred from recliner to /c, [facility]. Pt propelled self to bathroom to 

perform showering. Pt used grab bars to pull self up/stabilize while 

transferring to shower bench, [facility]. Pt doff UE/LE clothing Min A. Pt performed 

upper body washing with verbal cues to wash L arm with R hand.  Pt performed 

lower body washing with set up. Pt used grab bars to stabilize in standing while

cleansing buttocks, perineal area by self with SBA. Pt transferred from shower 

bench to w/c, [facility]. Pt don lower body clothing Mod A when threading feet/legs 

into pants/socks. Pt used DAVIS to stabilize while hiking pants over L hip. Pt 

don upper body clothing Min A threading R arm into shirt, pulling shirt over 

head. Pt propelled to sink to perform oral care by self, brushing hair with set 

up.


Therapy Code Descriptions/Definitions 





Functional [place] Measure:


0=Not Assessed/NA        4=Minimal Assistance


1=Total Assistance        5=Supervision or Setup


2=Maximal Assistance  6=Modified [place]


3=Moderate Assistance 7=Complete IndependenceSCALE: Activities may be completed 

with or without assistive devices.





6-Indepedent-patient completes the activity by him/herself with no assistance 

from a helper.


5-Set-up or Clean-up Assistance-helper sets up or cleans up; patient completes 

activity. [place] assists only prior to or  


    following the activity.


4-Supervision or Touching Assistance-helper provides verbal cues and/or 

touching/steadying and/or contact guard assistance as patient completes 

activity. Assistance may be provided   


    throughout the activity or intermittently.


3-Partial/Moderate Assistance-helper does LESS THAN HALF the effort. [place] 

lifts, holds or supports trunk or limbs, but provides less than half the effort.


2-Substantial/Maximal Assistance-helper does MORE THAN HALF the effort. [place] 

lifts or holds trunk or limbs and provides more than half the effort.


4-Yewvvcemz-whofjz does ALL the effort. Patient does none of the effort to 

complete the activity. Or, the assistance of 2 or more helpers is required for 

the patient to complete the  


    activity.


If activity was not attempted, code reason:


7-Patient Refused.


9-Not Applicable-not attempted and the patient did not perform the activity 

before the current illness, exacerbation or injury.


10-Not Attempted due to Environmental Limitations-(lack of equipment, weather 

restraints, etc.).


88-Not Attempted due to Medical Conditions or Safety Concerns.


Oral Hygiene (QC):  5


Bathing Location:  L Arm, R Arm, L Upper Leg, R Upper Leg, L Lower Leg 

(including foot), R Lower Leg (including foot), Chest, Abdomen, Buttocks, 

Perineal Area


Shower/Bathe Self (QC):  4


Upper Body Dressing (QC):  3


Lower Body Dressing (QC):  2


On/Off Footwear:  3





Other Treatment


Pt propelled to therapy gym. Pt worked on B UE strengthening/cognition activity 

while standing to work on standing balance, finger manipulation, eye hand 

coordination, memory for daily functional task. Pt was able to complete activity

placing 4 words onto board with verbal cues. Rest breaks required. Pt propelled 

self back to room. Pt transferred from w/c to bed, [facility]. Call light/phone in 

reach. All needs met.





OT Short Term Goals


Short Term Goals


Time Frame:  2020


Eating:  3


Oral hygiene:  3


Toileting hygiene:  3


Shower/bathe self:  3


Upper body dressin


Lower body dressing:  3


Putting on/taking off footwear:  3





OT Long Term Goals


Long Term Goals


Time Frame:  Dec 10, 2020


Eating (QC):  4


Oral Hygiene (QC):  4


Toileting Hygiene (QC):  3


Shower/Bathe Self (QC):  3


Upper Body Dressing (QC):  5


Lower Body Dressing (QC):  4


On/Off Footwear (QC):  4


Additional Goals:  1-Demonstrate ADL Tasks, 2-Verbalize Understanding, 3-

ImproveStrength/Nona


1=Demonstrate adherence to instructed precautions during ADL tasks.


2=Patient will verbalize/demonstrate understanding of assistive 

devices/modifications for ADL.


3=Patient will improve strength/tolerance for activity to enable patient to 

perform ADL's.





OT Education/Plan


Problem List/Assessment


Assessment:  Decreased Activ Tolerance, Decreased UE Strength, Impaired Funct 

Balance, Impaired Self-Care Skills


Visual perceptual issues difficult to pinpoint due to severe global aphasia.





Discharge Recommendations


Plan/Recommendations:  Continue POC





Treatment Plan/Plan of Care


Patient would benefit from OT for education, treatment and training to promote 

independence in ADL's, mobility, safety and/or upper extremity function for 

ADL's.


Plan of Care:  ADL Retraining, Caregiver Training, Cognitive Retraining, 

Functional Mobility, Group Exercise/Act as Ind, UE Funct Exercise/Act, UE 

Neuromus Re-Ed/Coord, Visual/Perceptual Retrain, W/C Management Training


Treatment Duration:  Dec 10, 2020


Frequency:  At least 5 of 7 days/Wk (IRF)


Estimated Hrs Per Day:  1.5 hours per day


Agreement:  Yes


Rehab Potential:  Fair





Time/GCodes


Start Time:  10:15


Stop Time:  11:30


Total Time Billed (hr/min):  75


Billed Treatment Time


1 visit- ADL 3 (45 mins), FA 2 (30 mins)











BENTLEY CHACON               2020 11:36

## 2020-11-28 NOTE — NUR
pt took Colace but refused miralax & Senokot, pt assisted to commode had moderate well 
formed brown stool

## 2020-11-28 NOTE — PHYSICAL THERAPY DAILY NOTE
PT Daily Note-Current


Subjective


Pt is in the chair and ready to get up.





Mental Status


Attachments:  Oxygen





Transfers


SCALE: Activities may be completed with or without assistive devices.





6-Indepedent-patient completes the activity by him/herself with no assistance 

from a helper.


5-Set-up or Clean-up Assistance-helper sets up or cleans up; patient completes 

activity. [place] assists only prior to or  


    following the activity.


4-Supervision or Touching Assistance-helper provides verbal cues and/or 

touching/steadying and/or contact guard assistance as patient completes 

activity. Assistance may be provided   


    throughout the activity or intermittently.


3-Partial/Moderate Assistance-helper does LESS THAN HALF the effort. [place] 

lifts, holds or supports trunk or limbs, but provides less than half the effort.


2-Substantial/Maximal Assistance-helper does MORE THAN HALF the effort. [place] 

lifts or holds trunk or limbs and provides more than half the effort.


0-Umsgmanbx-evthzk does ALL the effort. Patient does none of the effort to 

complete the activity. Or, the assistance of 2 or more helpers is required for 

the patient to complete the  


    activity.


If activity was not attempted, code reason:


7-Patient Refused.


9-Not Applicable-not attempted and the patient did not perform the activity be

fore the current illness, exacerbation or injury.


10-Not Attempted due to Environmental Limitations-(lack of equipment, weather 

restraints, etc.).


88-Not Attempted due to Medical Conditions or Safety Concerns.


Sit to Stand (QC):  3





Weight Bearing


Right Lower Extremity:  Right


Full Weight Bearing


Left Lower Extremity:  Left


Full Weight Bearing





Gait Training


Does the Patient Walk?:  Yes


Distance:  40ft x3


Walk 10 feet (QC):  3


Gait Persons Needed:  1


Gait Assistive Device:  Walker Platform





Exercises


Seated Therapy Exercises:  LE Protocol


Seated Reps:  15





Assessment


Current Status:  Fair Progress


Pt had difficulty with the (R) UE staying in the platform.





PT Short Term Goals


Short Term Goals


Time Frame:  2020


Roll Left & Right:  4


Sit to lyin (met)


Lying to sitting on side of be:  3


Chair/bed-to-chair transfer:  3 (met)


Walk 10 feet:  3 (met)


Does pt use a wc or scooter:  Yes


Wheel 50ft w/2 turns:  3


Wheel 150 feet:  3





PT Long Term Goals


Long Term Goals


PT Long Term Goals Time Frame:  Dec 3, 2020


Roll Left & Right (QC):  6


Sit to Lying (QC):  6


Lying-Sitting on Side/Bed(QC):  6


Sit to Stand (QC):  4


Chair/Bed-to-Chair Xfer(QC):  4


Toilet Transfer (QC):  4


Car Transfer (QC):  4


Does the Patient Walk:  Yes


Walk 10 feet (QC):  3


Walk 50ft with 2 Turns (QC):  3


Walk 150 ft (QC):  88


Walking 10ft on Uneven Surface:  3


1 Step (curb) (QC):  3


4 Steps (QC):  88


12 Steps (QC):  88


Picking up an Object (QC):  88


Does the Pt use WC or Scooter?:  Yes


Wheel 50 feet with 2 turns (QC:  6


Wheel 150 feet:  6





PT Plan


Treatment/Plan


Treatment Plan:  Continue Plan of Care


Treatment Plan:  Bed Mobility, Education, Functional Activity Nona, Functional 

Strength, Group Therapy, Gait, Safety, Therapeutic Exercise, Transfers


Treatment Duration:  2020


Frequency:  At least 5 of 7 days/Wk (IRF)


Estimated Hrs Per Day:  1.5 hours per day


Patient and/or Family Agrees t:  Yes





Time/GCodes


Time In:  0935


Time Out:  0950


Total Billed Treatment Time:  15


Total Billed Treatment


1, gt (15)











JOSSELYN SANDERS PT            2020 11:24

## 2020-11-28 NOTE — PROGRESS NOTE - UROLOGY
Progress Note-Urology


Progress Notes/Assess & Plan


Progress/Assessment & Plan


CONTINUES VOIDING. PLAN CHECK PVR TO DECIDE ON FURTHER WEANING OFF URECHOLINE


Final Diagnosis


RETENTION (RESOLVED)











TAWIL,ELIAS A MD               Nov 28, 2020 10:48

## 2020-11-28 NOTE — PM&R PROGRESS NOTE
Subjective


HPI/CC On Admission


Date Seen by Provider:  Nov 28, 2020


Time Seen by Provider:  14:00


Subjective/Events-last exam


11/28/20:


Not voiding very often


Doesn't drink much during day


No pain reported








11/27/20:


Improved status


Feeding herself now


Dramatic improvement since admit


No incontinence now


DC accucheck BID








11/26/20:


Voiding well now


No catheter now


Made her way to the commode on her own without help last night and urinated in 

her clothes on top of the commode


No incontinence usually








11/25/20:


Pt doing pretty well


Dumont is out, will evaluate whether she can void or not on her own


Urocholine is at 50mg AC and HS


Bowels are really moving well








11/24/20:


Pt doing pretty well 


Re-inserted dumont due to retention 


Yeast infection will be managed with Diflucan and Monistat cream








11/23/20:


Pt still doing pretty well 


Participation in therapy varies 


Cognition is an issue 


Overall doing well enough to remain stable 








11/22/20:


Dumont cath still in place


Urecholine 50mg dose now


BM today








11/21/20:


Likely will need catheter replaced


No pain reported


Feeder maintained adequate consumption








11/20/20:


Patient about the same


Requires a feeder to eat


No pain reported


Confusion is a limitation








11/19/20:


Pt has varying participation with PT and OT 


Accu cheks changed to BID 


Will monitor pt closely 


Denies any pain 


Is a feeder








11/18/20:


Re inserted the dumont since urinary retention continued


Increasing oral fluids


Urecholine was increased dose wise


Bowels are moving








11/17/20:


Xanax given last night 


Bowels moved yesterday 


Refusing to eat supper or breakfast today 


Agitated a bit today 


Post-void residual had 238


Hadnt voided since discontinued the cath yesterday 








11/16/20:


Dr. Tawil will discontinue the catheter and evaluate voiding trial today 


Flomax and Urecholine maintained 


Bowels are moving 


Very difficult to recover from catastrophic stroke








11/15/20:


Patient participating with therapy


No pain reported


Dr Tawil consulted for dumont catheter








11/14/20:


Patient denies pain


Working well with structured therapies


Dumont cath still in place will need to DC soon


Working on BM regimen








Patient doing well


Settling in well


No pain reported


Working with PT OT and ST


Eating a bit more without aspiration


No falls


Checked meds and labs


Conferred with RN


Reviewed therapy notes





Review of Systems


General:  Fatigue


Neurological:  Weakness, Incoordination





Objective


Exam


Vital Signs





Vital Signs








  Date Time  Temp Pulse Resp B/P (MAP) Pulse Ox O2 Delivery O2 Flow Rate FiO2


 


11/28/20 09:24      Room Air  


 


11/28/20 05:32 37.1 73 16 132/60 ([phone] 94   





Capillary Refill : Less Than 3 Seconds


General Appearance:  No Apparent Distress, Anxious, Chronically ill, Thin


HEENT:  PERRL/EOMI, Normal ENT Inspection, Pharynx Normal


Neck:  Full Range of Motion, Normal Inspection, Non Tender, Supple, Carotid 

Bruit


Respiratory:  Chest Non Tender, Lungs Clear, No Accessory Muscle Use, No 

Respiratory Distress, Decreased Breath Sounds


Cardiovascular:  Regular Rate, Rhythm, No Edema, No Gallop, No JVD, No Murmur, 

Normal Peripheral Pulses


Gastrointestinal:  Normal Bowel Sounds, No Organomegaly, No Pulsatile Mass, Non 

Tender, Soft


Back:  Normal Inspection, No CVA Tenderness, No Vertebral Tenderness


Extremity:  Normal Capillary Refill, Normal Inspection, Normal Range of Motion 

(except right sided weakness), Non Tender, No Calf Tenderness, No Pedal Edema


Neurologic/Psychiatric:  Alert, No Motor/Sensory Deficits, Normal Mood/Affect, 

Abnormal CNs II-XII, Aphasia, Facial Droop, Motor Weakness (right sided 

flaccidity)


Skin:  Normal Color, Warm/Dry


Lymphatic:  No Adenopathy





Results/Procedures


Lab


Patient resulted labs reviewed.





FIM


Transfers


Therapy Code Descriptions/Definitions 





Functional [place] Measure:


0=Not Assessed/NA        4=Minimal Assistance


1=Total Assistance        5=Supervision or Setup


2=Maximal Assistance  6=Modified [place]


3=Moderate Assistance 7=Complete IndependenceSCALE: Activities may be completed 

with or without assistive devices.





6-Indepedent-patient completes the activity by him/herself with no assistance 

from a helper.


5-Set-up or Clean-up Assistance-helper sets up or cleans up; patient completes 

activity. [place] assists only prior to or  


    following the activity.


4-Supervision or Touching Assistance-helper provides verbal cues and/or 

touching/steadying and/or contact guard assistance as patient completes 

activity. Assistance may be provided   


    throughout the activity or intermittently.


3-Partial/Moderate Assistance-helper does LESS THAN HALF the effort. [place] 

lifts, holds or supports trunk or limbs, but provides less than half the effort.


2-Substantial/Maximal Assistance-helper does MORE THAN HALF the effort. [place] 

lifts or holds trunk or limbs and provides more than half the effort.


1-Gnkilviox-acnopa does ALL the effort. Patient does none of the effort to 

complete the activity. Or, the assistance of 2 or more helpers is required for 

the patient to complete the  


    activity.


If activity was not attempted, code reason:


7-Patient Refused.


9-Not Applicable-not attempted and the patient did not perform the activity 

before the current illness, exacerbation or injury.


10-Not Attempted due to Environmental Limitations-(lack of equipment, weather 

restraints, etc.).


88-Not Attempted due to Medical Conditions or Safety Concerns.


Roll Left to Right (QC):  4


Sit to Lying (QC):  4


Sit to Stand (QC):  3


Chair/Bed-to-Chair Xfer(QC):  4


Car Transfer (QC):  3





Gait Training


Does the Patient Walk?:  Yes


Distance:  40ft x3


Walk 10 feet (QC):  3


Walk 50 ft with 2 Turns(QC):  3


Walk 150 ft (QC):  88


Walking 10ft/uneven surface-QC:  88


Gait Persons Needed:  1


Gait Assistive Device:  Walker Platform





Wheelchair Training


Does the Pt Use a Wheelchair?:  Yes


Distance:  150'x3


Wheel 50 ft with 2 turns (QC):  3


Wheel 150 ft (QC):  5


Type of Wheelchair:  Manual





Stair Training


1 Step (curb) (QC):  88


4 Steps (QC):  88


12 Steps (QC):  88





Balance


Picking up an Object (QC):  88





ADL-Treatment


Eating (QC):  4


Oral Hygiene (QC):  5


Bathing Location:  L Arm, R Arm, L Upper Leg, R Upper Leg, L Lower Leg 

(including foot), R Lower Leg (including foot), Chest, Abdomen, Buttocks, 

Perineal Area


Shower/Bathe Self (QC):  4


Upper Body Dressing (QC):  3


Lower Body Dressing (QC):  2


On/Off Footwear (QC):  3


Toileting Hygiene (QC):  7


Toilet Transfer (QC):  7 (Pt. indicates at begining and end of treatment that 

she does not need to use bathroom.)





Assessment/Plan


Assessment and Plan


Assess & Plan/Chief Complaint


Assessment per Luke Bainbridge, MSIII:


Critical illness myopathy


S/P CVA involving left occipital artery


S/P 3 vessel CABG


Debility


Expressive aphasia


Dysarthria


Right sided weakness


Diabetes mellitus


COPD


CAD


Dysphagia requiring PEG now eating well


Dumont cath in place now DC and voiding well on Urecholine





Plan:


1. Ensure adequate anticoagulation


2. PT, OT, and SLP eval's and treat


3. Oxygen via NC- wean as tolerated


4. Resume Home medications


5. Fall precautions


6. Dietary consult


7. DVT prophylaxis





11/13/20:


Patient participating well


No pain reported


Continue current meds


Advance diet





11/14/20:


Dumont DC soon


Continue home meds


Therapies to continue protocol





11/15/20:


Dr Tawil appreciated


Monitor BP


Monitor labs





11/16/20:


Bladder meds


Appreciate Dr Tawil


PT OT ST





11/17/20:


Voiding trial


Monitor closely


Variable moods





11/18/20:


Monitor BP


Encourage PO intake


Dumont cath management





11/19/20:


Encourage participation with therapies


Monitor dumont cath





11/20/20:


Monitor closely


Bladder management





11/21/20:


Monitor closely


Cath maintained?





11/22/20:


Cath in place


Check labs in am


Increased dose of Urecholine





11/23/20:


Monitor bladder function


Increased dose of Urecholine





11/24/20:


Maintain dumont catheter


IRF protocol


Family evaluating DC plans





11/25/20:


DC next week


Dumont cath in place now


Monitor for falls





11/26/20:


Voiding well without cath now


Monitor closely





11/27/20:


DC accuchecks


Dramatic improvement since admit





11/28/20:


Monitor dysphagia


PT OT


Monitor voiding





(1) CVA (cerebral vascular accident)


(2) Dysarthria


(3) Dysphagia


(4) PEG (percutaneous endoscopic gastrostomy) status


(5) Expressive aphasia


(6) Smoker


(7) COPD (chronic obstructive pulmonary disease)


(8) CAD (coronary artery disease)


(9) Hx of CABG


(10) Dumont catheter in place


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











ANDREY BAH DO                Nov 28, 2020 13:57

## 2020-11-28 NOTE — NUR
pt very anxious trying to get put of bed but denies need to void or have stool, xanax 0.25mg 
given bed alarm on

## 2020-11-28 NOTE — NUR
VOIDED 250 CC. POST VOID BLADDER SCAN SHOWED 24 CC. DR. TAWIL NOTIFIED AND URECHOLINE DOSAGE 
DECREASED TO 25 MG ACTID. DOES NOT VOID VERY OFTEN. NO INCONTINENCY.

## 2020-11-29 NOTE — PM&R PROGRESS NOTE
Subjective


HPI/CC On Admission


Date Seen by Provider:  Nov 29, 2020


Time Seen by Provider:  12:30


Subjective/Events-last exam


11/29/20:


Decreasing Urecholine per Dr Tawil now


No pain reported


No falls


No incontinence








11/28/20:


Not voiding very often


Doesn't drink much during day


No pain reported








11/27/20:


Improved status


Feeding herself now


Dramatic improvement since admit


No incontinence now


DC accucheck BID








11/26/20:


Voiding well now


No catheter now


Made her way to the commode on her own without help last night and urinated in 

her clothes on top of the commode


No incontinence usually








11/25/20:


Pt doing pretty well


Dumont is out, will evaluate whether she can void or not on her own


Urocholine is at 50mg AC and HS


Bowels are really moving well








11/24/20:


Pt doing pretty well 


Re-inserted dumont due to retention 


Yeast infection will be managed with Diflucan and Monistat cream








11/23/20:


Pt still doing pretty well 


Participation in therapy varies 


Cognition is an issue 


Overall doing well enough to remain stable 








11/22/20:


Dumont cath still in place


Urecholine 50mg dose now


BM today








11/21/20:


Likely will need catheter replaced


No pain reported


Feeder maintained adequate consumption








11/20/20:


Patient about the same


Requires a feeder to eat


No pain reported


Confusion is a limitation








11/19/20:


Pt has varying participation with PT and OT 


Accu cheks changed to BID 


Will monitor pt closely 


Denies any pain 


Is a feeder








11/18/20:


Re inserted the dumont since urinary retention continued


Increasing oral fluids


Urecholine was increased dose wise


Bowels are moving








11/17/20:


Xanax given last night 


Bowels moved yesterday 


Refusing to eat supper or breakfast today 


Agitated a bit today 


Post-void residual had 238


Hadnt voided since discontinued the cath yesterday 








11/16/20:


Dr. Tawil will discontinue the catheter and evaluate voiding trial today 


Flomax and Urecholine maintained 


Bowels are moving 


Very difficult to recover from catastrophic stroke








11/15/20:


Patient participating with therapy


No pain reported


Dr Tawil consulted for dumont catheter








11/14/20:


Patient denies pain


Working well with structured therapies


Dumont cath still in place will need to DC soon


Working on BM regimen








Patient doing well


Settling in well


No pain reported


Working with PT OT and ST


Eating a bit more without aspiration


No falls


Checked meds and labs


Conferred with RN


Reviewed therapy notes





Review of Systems


General:  Fatigue


Neurological:  Weakness, Incoordination, Change in speech





Objective


Exam


Vital Signs





Vital Signs








  Date Time  Temp Pulse Resp B/P (MAP) Pulse Ox O2 Delivery O2 Flow Rate FiO2


 


11/29/20 09:00      Room Air  


 


11/29/20 07:42     96   


 


11/29/20 05:09 36.2 81 18 154/70 (98)    





Capillary Refill : Less Than 3 Seconds


General Appearance:  No Apparent Distress, Anxious, Chronically ill, Thin


HEENT:  PERRL/EOMI, Normal ENT Inspection, Pharynx Normal


Neck:  Full Range of Motion, Normal Inspection, Non Tender, Supple, Carotid 

Bruit


Respiratory:  Chest Non Tender, Lungs Clear, No Accessory Muscle Use, No 

Respiratory Distress, Decreased Breath Sounds


Cardiovascular:  Regular Rate, Rhythm, No Edema, No Gallop, No JVD, No Murmur, 

Normal Peripheral Pulses


Gastrointestinal:  Normal Bowel Sounds, No Organomegaly, No Pulsatile Mass, Non 

Tender, Soft


Back:  Normal Inspection, No CVA Tenderness, No Vertebral Tenderness


Extremity:  Normal Capillary Refill, Normal Inspection, Normal Range of Motion 

(except right sided weakness), Non Tender, No Calf Tenderness, No Pedal Edema


Neurologic/Psychiatric:  Alert, No Motor/Sensory Deficits, Normal Mood/Affect, 

Abnormal CNs II-XII, Aphasia, Facial Droop, Motor Weakness (right sided 

flaccidity)


Skin:  Normal Color, Warm/Dry


Lymphatic:  No Adenopathy





Results/Procedures


Lab


Patient resulted labs reviewed.





FIM


Transfers


Therapy Code Descriptions/Definitions 





Functional [place] Measure:


0=Not Assessed/NA        4=Minimal Assistance


1=Total Assistance        5=Supervision or Setup


2=Maximal Assistance  6=Modified [place]


3=Moderate Assistance 7=Complete IndependenceSCALE: Activities may be completed 

with or without assistive devices.





6-Indepedent-patient completes the activity by him/herself with no assistance 

from a helper.


5-Set-up or Clean-up Assistance-helper sets up or cleans up; patient completes 

activity. [place] assists only prior to or  


    following the activity.


4-Supervision or Touching Assistance-helper provides verbal cues and/or 

touching/steadying and/or contact guard assistance as patient completes activity

. Assistance may be provided   


    throughout the activity or intermittently.


3-Partial/Moderate Assistance-helper does LESS THAN HALF the effort. [place] 

lifts, holds or supports trunk or limbs, but provides less than half the effort.


2-Substantial/Maximal Assistance-helper does MORE THAN HALF the effort. [place] 

lifts or holds trunk or limbs and provides more than half the effort.


3-[id number]-tdfbdl does ALL the effort. Patient does none of the effort to 

complete the activity. Or, the assistance of 2 or more helpers is required for 

the patient to complete the  


    activity.


If activity was not attempted, code reason:


7-Patient Refused.


9-Not Applicable-not attempted and the patient did not perform the activity 

before the current illness, exacerbation or injury.


10-Not Attempted due to Environmental Limitations-(lack of equipment, weather 

restraints, etc.).


88-Not Attempted due to Medical Conditions or Safety Concerns.


Roll Left to Right (QC):  4


Sit to Lying (QC):  4


Sit to Stand (QC):  3


Chair/Bed-to-Chair Xfer(QC):  4


Car Transfer (QC):  3





Gait Training


Does the Patient Walk?:  Yes


Distance:  40ft x3


Walk 10 feet (QC):  3


Walk 50 ft with 2 Turns(QC):  3


Walk 150 ft (QC):  88


Walking 10ft/uneven surface-QC:  88


Gait Persons Needed:  1


Gait Assistive Device:  Walker Platform





Wheelchair Training


Does the Pt Use a Wheelchair?:  Yes


Distance:  150'x3


Wheel 50 ft with 2 turns (QC):  3


Wheel 150 ft (QC):  5


Type of Wheelchair:  Manual





Stair Training


1 Step (curb) (QC):  88


4 Steps (QC):  88


12 Steps (QC):  88





Balance


Picking up an Object (QC):  88





ADL-Treatment


Eating (QC):  4


Oral Hygiene (QC):  5


Bathing Location:  L Arm, R Arm, L Upper Leg, R Upper Leg, L Lower Leg 

(including foot), R Lower Leg (including foot), Chest, Abdomen, Buttocks, 

Perineal Area


Shower/Bathe Self (QC):  4


Upper Body Dressing (QC):  3


Lower Body Dressing (QC):  2


On/Off Footwear (QC):  3


Toileting Hygiene (QC):  7


Toilet Transfer (QC):  7 (Pt. indicates at begining and end of treatment that 

she does not need to use bathroom.)





Assessment/Plan


Assessment and Plan


Assess & Plan/Chief Complaint


Assessment per Luke Bainbridge, MSIII:


Critical illness myopathy


S/P CVA involving left occipital artery


S/P 3 vessel CABG


Debility


Expressive aphasia


Dysarthria


Right sided weakness


Diabetes mellitus


COPD


CAD


Dysphagia requiring PEG now eating well


Dumont cath in place now DC and voiding well on Urecholine





Plan:


1. Ensure adequate anticoagulation


2. PT, OT, and SLP eval's and treat


3. Oxygen via NC- wean as tolerated


4. Resume Home medications


5. Fall precautions


6. Dietary consult


7. DVT prophylaxis





11/13/20:


Patient participating well


No pain reported


Continue current meds


Advance diet





11/14/20:


Dumont DC soon


Continue home meds


Therapies to continue protocol





11/15/20:


Dr Tawil appreciated


Monitor BP


Monitor labs





11/16/20:


Bladder meds


Appreciate Dr Tawil


PT OT ST





11/17/20:


Voiding trial


Monitor closely


Variable moods





11/18/20:


Monitor BP


Encourage PO intake


Dumont cath management





11/19/20:


Encourage participation with therapies


Monitor dumont cath





11/20/20:


Monitor closely


Bladder management





11/21/20:


Monitor closely


Cath maintained?





11/22/20:


Cath in place


Check labs in am


Increased dose of Urecholine





11/23/20:


Monitor bladder function


Increased dose of Urecholine





11/24/20:


Maintain dumont catheter


IRF protocol


Family evaluating DC plans





11/25/20:


DC next week


Dumont cath in place now


Monitor for falls





11/26/20:


Voiding well without cath now


Monitor closely





11/27/20:


DC accuchecks


Dramatic improvement since admit





11/28/20:


Monitor dysphagia


PT OT


Monitor voiding





11/29/20:


Decrease Urecholine


Monitor BP


Fall risk





(1) CVA (cerebral vascular accident)


(2) Dysarthria


(3) Dysphagia


(4) PEG (percutaneous endoscopic gastrostomy) status


(5) Expressive aphasia


(6) Smoker


(7) COPD (chronic obstructive pulmonary disease)


(8) CAD (coronary artery disease)


(9) Hx of CABG


(10) Dumont catheter in place


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











ANDREY BAH DO                Nov 29, 2020 07:31

## 2020-11-29 NOTE — PROGRESS NOTE - UROLOGY
Progress Note-Urology


Progress Notes/Assess & Plan


Progress/Assessment & Plan


CONTINUES WELL ON URECHOLINE TID. PLAN BID


Final Diagnosis


RETENTION











TAWIL,ELIAS A MD               Nov 29, 2020 11:55

## 2020-11-30 NOTE — OCCUPATIONAL THER DAILY NOTE
OT Current Status-Daily Note


Subjective


No pain reported.





Appearance


Pt. up in chair.  Agrees to work with OT.





Mental Status/Objective


Patient Orientation:  Person





ADL-Treatment


Therapy Code Descriptions/Definitions 





Functional [place] Measure:


0=Not Assessed/NA        4=Minimal Assistance


1=Total Assistance        5=Supervision or Setup


2=Maximal Assistance  6=Modified [place]


3=Moderate Assistance 7=Complete IndependenceSCALE: Activities may be completed 

with or without assistive devices.





6-Indepedent-patient completes the activity by him/herself with no assistance 

from a helper.


5-Set-up or Clean-up Assistance-helper sets up or cleans up; patient completes 

activity. [place] assists only prior to or  


    following the activity.


4-Supervision or Touching Assistance-helper provides verbal cues and/or 

touching/steadying and/or contact guard assistance as patient completes 

activity. Assistance may be provided   


    throughout the activity or intermittently.


3-Partial/Moderate Assistance-helper does LESS THAN HALF the effort. [place] 

lifts, holds or supports trunk or limbs, but provides less than half the effort.


2-Substantial/Maximal Assistance-helper does MORE THAN HALF the effort. [place] 

lifts or holds trunk or limbs and provides more than half the effort.


4-Npfajxtwo-mekvqb does ALL the effort. Patient does none of the effort to 

complete the activity. Or, the assistance of 2 or more helpers is required for 

the patient to complete the  


    activity.


If activity was not attempted, code reason:


7-Patient Refused.


9-Not Applicable-not attempted and the patient did not perform the activity 

before the current illness, exacerbation or injury.


10-Not Attempted due to Environmental Limitations-(lack of equipment, weather 

restraints, etc.).


88-Not Attempted due to Medical Conditions or Safety Concerns.


Shower/Bathe Self (QC):  3 (Mod assist overall to shower and wash all parts.  

Max cues.)


Upper Body Dressing (QC):  2


Lower Body Dressing (QC):  2


On/Off Footwear:  2





Other Treatment


Pt. is able to stand from chair and ambulate with walker and min assist to 

shower.  Once seated in shower, pt. had increased difficulty following cues to 

doff clothing.  Pt. becomes agitated and upset, as she is struggling.  OT 

attempts to calm her and encourage her.  After shower, pt. transferred to 

wheelchair.  OT took pt. to mirror in bathroom and encouraged her to brush her 

hair.  She dabs at hair with brush, but is unable to do it efficiently.  OT did 

this for her.  Pt. able to self propel wheelchair with min assist and cues.  Pt.

engaged in therapy gym in series of visual motor exercises.  Pt. having 

difficulty seeing anything placed on her right side, and encouraged to use her 

hand to find the item, and then follow to the left.  Completed simple wooden 

puzzle with max cues and assistance to place the pieces correctly. Pt. having 

difficulty attending to right side, crossing midline, and following cues to 

complete simple task.  Pt. taken back to room and transferred with CGA to 

reclining chair.  Chair alarm set and all needs met.





Education


OT Patient Education:  Correct positioning, Exercise program, Modified ADL 

techniques, Progress toward Goal/Update tx plan, Purpose of tx/functional 

activities, Reviewed precautions, Rehab process, Transfer techniques


Teaching Recipient:  Patient


Teaching Methods:  Demonstration, Discussion


Response to Teaching:  Reinforcement Needed





OT Short Term Goals


Short Term Goals


Time Frame:  2020


Eating:  3


Oral hygiene:  3


Toileting hygiene:  3


Shower/bathe self:  3


Upper body dressin


Lower body dressing:  3


Putting on/taking off footwear:  3





OT Long Term Goals


Long Term Goals


Time Frame:  Dec 10, 2020


Eating (QC):  4


Oral Hygiene (QC):  4


Toileting Hygiene (QC):  3


Shower/Bathe Self (QC):  3


Upper Body Dressing (QC):  5


Lower Body Dressing (QC):  4


On/Off Footwear (QC):  4


Additional Goals:  1-Demonstrate ADL Tasks, 2-Verbalize Understanding, 3-Im

proveStrength/Nona


1=Demonstrate adherence to instructed precautions during ADL tasks.


2=Patient will verbalize/demonstrate understanding of assistive 

devices/modifications for ADL.


3=Patient will improve strength/tolerance for activity to enable patient to 

perform ADL's.





OT Education/Plan


Problem List/Assessment


Assessment:  Decreased Activ Tolerance, Decreased UE Strength, Dependent 

Transfers, Impaired Cognition, Impaired Coordination, Impaired Funct Balance, 

Impaired I ADL's, Impaired Self-Care Skills, Restricted Funct UE ROM, Visual-

Perceptual Deficit


Visual perceptual issues difficult to pinpoint due to severe global aphasia.





Discharge Recommendations


Plan/Recommendations:  Continue POC


Therapy Discharge Recommendati:  Scheduled Assistance, Home & Family, Post Acute

OT





Treatment Plan/Plan of Care


Treatment,Training & Education:  Yes


Patient would benefit from OT for education, treatment and training to promote 

independence in ADL's, mobility, safety and/or upper extremity function for 

ADL's.


Plan of Care:  ADL Retraining, Caregiver Training, Cognitive Retraining, 

Functional Mobility, Group Exercise/Act as Ind, UE Funct Exercise/Act, UE 

Neuromus Re-Ed/Coord, Visual/Perceptual Retrain, W/C Management Training


Treatment Duration:  Dec 10, 2020


Frequency:  At least 5 of 7 days/Wk (IRF)


Estimated Hrs Per Day:  1.5 hours per day


Agreement:  Yes


Rehab Potential:  Fair





Time/GCodes


Start Time:  10:30


Stop Time:  11:45


Total Time Billed (hr/min):  75


Billed Treatment Time


1, ADL x 45minutes, FA x 30minutes











ROSANNA LOVING OT           2020 14:05

## 2020-11-30 NOTE — PHYSICAL THERAPY DAILY NOTE
PT Daily Note-Current


Subjective


Pt. agrees to gait and toileting training. Wants to lay down after Rx





Pain





   Location:  No Pain Reported





Mental Status


Patient Orientation:  Non-Verbal/Aphasic





Transfers


SCALE: Activities may be completed with or without assistive devices.





6-Indepedent-patient completes the activity by him/herself with no assistance 

from a helper.


5-Set-up or Clean-up Assistance-helper sets up or cleans up; patient completes 

activity. [place] assists only prior to or  


    following the activity.


4-Supervision or Touching Assistance-helper provides verbal cues and/or 

touching/steadying and/or contact guard assistance as patient completes 

activity. Assistance may be provided   


    throughout the activity or intermittently.


3-Partial/Moderate Assistance-helper does LESS THAN HALF the effort. [place] 

lifts, holds or supports trunk or limbs, but provides less than half the effort.


2-Substantial/Maximal Assistance-helper does MORE THAN HALF the effort. [place] 

lifts or holds trunk or limbs and provides more than half the effort.


1-Zhxloliwb-yftzai does ALL the effort. Patient does none of the effort to 

complete the activity. Or, the assistance of 2 or more helpers is required for 

the patient to complete the  


    activity.


If activity was not attempted, code reason:


7-Patient Refused.


9-Not Applicable-not attempted and the patient did not perform the activity 

before the current illness, exacerbation or injury.


10-Not Attempted due to Environmental Limitations-(lack of equipment, weather 

restraints, etc.).


88-Not Attempted due to Medical Conditions or Safety Concerns.


on off toilet, in out chair CGA, in out bed min to CGA





Weight Bearing


Right Lower Extremity:  Right


Full Weight Bearing


Left Lower Extremity:  Left


Full Weight Bearing





Gait Training


Does the Patient Walk?:  Yes


Gait Assistive Device:  Walker Platform


25 ft, 35 ft with platf FWW , further adjustments made for platf, min to CGA 

required,





Treatments


pt. toileted managing pants and cleaning indep





Assessment


Current Status:  Good Progress





PT Short Term Goals


Short Term Goals


Time Frame:  2020


Roll Left & Right:  4


Sit to lyin (met)


Lying to sitting on side of be:  3


Chair/bed-to-chair transfer:  3 (met)


Walk 10 feet:  3 (met)


Does pt use a wc or scooter:  Yes


Wheel 50ft w/2 turns:  3


Wheel 150 feet:  3





PT Long Term Goals


Long Term Goals


PT Long Term Goals Time Frame:  Dec 3, 2020


Roll Left & Right (QC):  6


Sit to Lying (QC):  6


Lying-Sitting on Side/Bed(QC):  6


Sit to Stand (QC):  4


Chair/Bed-to-Chair Xfer(QC):  4


Toilet Transfer (QC):  4


Car Transfer (QC):  4


Does the Patient Walk:  Yes


Walk 10 feet (QC):  3


Walk 50ft with 2 Turns (QC):  3


Walk 150 ft (QC):  88


Walking 10ft on Uneven Surface:  3


1 Step (curb) (QC):  3


4 Steps (QC):  88


12 Steps (QC):  88


Picking up an Object (QC):  88


Does the Pt use WC or Scooter?:  Yes


Wheel 50 feet with 2 turns (QC:  6


Wheel 150 feet:  6





PT Plan


Treatment/Plan


Treatment Plan:  Continue Plan of Care


Treatment Plan:  Bed Mobility, Education, Functional Activity Nona, Functional 

Strength, Group Therapy, Gait, Safety, Therapeutic Exercise, Transfers


Treatment Duration:  2020


Frequency:  At least 5 of 7 days/Wk (IRF)


Estimated Hrs Per Day:  1.5 hours per day


Patient and/or Family Agrees t:  Yes





Safety Risks/Education


Patient Education:  Gait Training, Transfer Techniques, Correct Positioning, 

Disease Process, Safety Issues


Teaching Recipient:  Patient


Teaching Methods:  Discussion


Response to Teaching:  Return Demonstration, Reinforcement Needed





Time/GCodes


Time In:  1330


Time Out:  1345


Total Billed Treatment Time:  15


Total Billed Treatment


1,FA15m











ARRON WHATLEY PTA             2020 13:51

## 2020-11-30 NOTE — CARDIOLOGY PROGRESS NOTE
Subjective


Date Seen by Provider:  2020


Time Seen by Provider:  08:35


Subjective/Events-last exam


Patient is sitting up in chair, no new complaints. Denies any chest pain or 

dyspnea.





Objective-Cardiology


Exam


Last Set of Vital Signs





Vital Signs








 20





 05:14 08:15 09:00


 


Temp 36.2  


 


Pulse 83  


 


Resp 18  


 


B/P (MAP) 130/72 (91)  


 


Pulse Ox  99 


 


O2 Delivery   Room Air





Capillary Refill : Less Than 3 Seconds


I&O











Intake and Output 


 


 20





 00:00


 


Intake Total 1185 ml


 


Output Total 500 ml


 


Balance 685 ml


 


 


 


Intake Oral 1185 ml


 


Output Urine Total 500 ml


 


Bladder Scan Volume Amount 0 ml





 0 ml


 


# Voids 4


 


# Bowel Movements 2








General:  Alert, Oriented X3, Cooperative


HEENT:  Atraumatic, PERRLA


Neck:  Supple, No JVD, No Thyromegaly


Lungs:  Clear to Auscultation, Normal Air Movement


Heart:  Regular Rate, Normal S1, Normal S2, No Murmurs


Abdomen:  Normal Bowel Sounds, Soft, No Tenderness, No Hepatosplenomegaly, No 

Masses


Extremities:  No Clubbing, No Cyanosis, No Edema, Normal Pulses, No 

Tenderness/Swelling


Skin:  No Rashes, No Breakdown, No Significant Lesion


Neuro:  Normal Gait, Normal Speech, Strength at 5/5 X4 Ext, Normal Tone, 

Sensation Intact


Psych/Mental Status:  Mental Status NL, Mood NL





Results


Lab


Laboratory Tests


20 04:45














A/P-Cardiology


Admission Diagnosis


CVA


Coronary artery disease


Hypertension


Hyperlipidemia





Assessment/Plan


Status post CVA post CABG with residual right hemiparesis, receiving PT/OT, on 

Plavix and ASA.





Coronary artery disease status post CABG 3.  Continue to monitor at this time.





Hypertension, controlled, continue to monitor





Hyperlipidemia, monitor lipids





Questionable peripheral arterial disease with ischemic event after her bypass 

has improved after conservative management, continue on Plavix





Diabetes mellitus, followed and managed by primary care physician





Debility, receiving physical therapy





Patient was seen and evaluated with Hedy, examination performed, management 

plan was discussed, agree with the current scribed note, I made few changes to 

the note using Italic font


Patient is feeling well, receiving physical therapy


No new complaint.  Continue to monitor





Clinical Quality Measures


DVT/VTE Risk/Contraindication:


Risk Factor Score Per Nursin


RFS Level Per Nursing on Admit:  4+=Very High











HEDY BASHIR 2020 8:36 am


BROOKE SHEA MD             2020 12:12 pm

## 2020-11-30 NOTE — PM&R PROGRESS NOTE
Subjective


HPI/CC On Admission


Date Seen by Provider:  Nov 30, 2020


Time Seen by Provider:  08:30


Subjective/Events-last exam


11/30/20:


Labs are okay 


Bowels moved yesterday 


Urinating well, no urinary retention 


Discharge planned later this week 








11/29/20:


Decreasing Urecholine per Dr Tawil now


No pain reported


No falls


No incontinence








11/28/20:


Not voiding very often


Doesn't drink much during day


No pain reported








11/27/20:


Improved status


Feeding herself now


Dramatic improvement since admit


No incontinence now


DC accucheck BID








11/26/20:


Voiding well now


No catheter now


Made her way to the commode on her own without help last night and urinated in 

her clothes on top of the commode


No incontinence usually








11/25/20:


Pt doing pretty well


Dumont is out, will evaluate whether she can void or not on her own


Urocholine is at 50mg AC and HS


Bowels are really moving well








11/24/20:


Pt doing pretty well 


Re-inserted dumont due to retention 


Yeast infection will be managed with Diflucan and Monistat cream








11/23/20:


Pt still doing pretty well 


Participation in therapy varies 


Cognition is an issue 


Overall doing well enough to remain stable 








11/22/20:


Dumont cath still in place


Urecholine 50mg dose now


BM today








11/21/20:


Likely will need catheter replaced


No pain reported


Feeder maintained adequate consumption








11/20/20:


Patient about the same


Requires a feeder to eat


No pain reported


Confusion is a limitation








11/19/20:


Pt has varying participation with PT and OT 


Accu cheks changed to BID 


Will monitor pt closely 


Denies any pain 


Is a feeder








11/18/20:


Re inserted the dumont since urinary retention continued


Increasing oral fluids


Urecholine was increased dose wise


Bowels are moving








11/17/20:


Xanax given last night 


Bowels moved yesterday 


Refusing to eat supper or breakfast today 


Agitated a bit today 


Post-void residual had 238


Hadnt voided since discontinued the cath yesterday 








11/16/20:


Dr. Tawil will discontinue the catheter and evaluate voiding trial today 


Flomax and Urecholine maintained 


Bowels are moving 


Very difficult to recover from catastrophic stroke








11/15/20:


Patient participating with therapy


No pain reported


Dr Tawil consulted for dumont catheter








11/14/20:


Patient denies pain


Working well with structured therapies


Dumont cath still in place will need to DC soon


Working on BM regimen








Patient doing well


Settling in well


No pain reported


Working with PT OT and ST


Eating a bit more without aspiration


No falls


Checked meds and labs


Conferred with RN


Reviewed therapy notes





Review of Systems


Neurological:  Weakness, Incoordination, Change in speech





Objective


Exam


Vital Signs





Vital Signs








  Date Time  Temp Pulse Resp B/P (MAP) Pulse Ox O2 Delivery O2 Flow Rate FiO2


 


11/30/20 21:51     99 Room Air  


 


11/30/20 18:30 36.2 73 18 147/67 (93)    





Capillary Refill : Less Than 3 Seconds


General Appearance:  No Apparent Distress, Anxious, Chronically ill, Thin


HEENT:  PERRL/EOMI, Normal ENT Inspection, Pharynx Normal


Neck:  Full Range of Motion, Normal Inspection, Non Tender, Supple, Carotid 

Bruit


Respiratory:  Chest Non Tender, Lungs Clear, No Accessory Muscle Use, No 

Respiratory Distress, Decreased Breath Sounds


Cardiovascular:  Regular Rate, Rhythm, No Edema, No Gallop, No JVD, No Murmur, 

Normal Peripheral Pulses


Gastrointestinal:  Normal Bowel Sounds, No Organomegaly, No Pulsatile Mass, Non 

Tender, Soft


Back:  Normal Inspection, No CVA Tenderness, No Vertebral Tenderness


Extremity:  Normal Capillary Refill, Normal Inspection, Normal Range of Motion 

(except right sided weakness), Non Tender, No Calf Tenderness, No Pedal Edema


Neurologic/Psychiatric:  Alert, No Motor/Sensory Deficits, Normal Mood/Affect, 

Abnormal CNs II-XII, Aphasia, Facial Droop, Motor Weakness (right sided 

flaccidity)


Skin:  Normal Color, Warm/Dry


Lymphatic:  No Adenopathy





Results/Procedures


Lab


Patient resulted labs reviewed.





FIM


Transfers


Therapy Code Descriptions/Definitions 





Functional [place] Measure:


0=Not Assessed/NA        4=Minimal Assistance


1=Total Assistance        5=Supervision or Setup


2=Maximal Assistance  6=Modified [place]


3=Moderate Assistance 7=Complete IndependenceSCALE: Activities may be completed 

with or without assistive devices.





6-Indepedent-patient completes the activity by him/herself with no assistance fr

om a helper.


5-Set-up or Clean-up Assistance-helper sets up or cleans up; patient completes 

activity. [place] assists only prior to or  


    following the activity.


4-Supervision or Touching Assistance-helper provides verbal cues and/or 

touching/steadying and/or contact guard assistance as patient completes 

activity. Assistance may be provided   


    throughout the activity or intermittently.


3-Partial/Moderate Assistance-helper does LESS THAN HALF the effort. [place] 

lifts, holds or supports trunk or limbs, but provides less than half the effort.


2-Substantial/Maximal Assistance-helper does MORE THAN HALF the effort. [place] 

lifts or holds trunk or limbs and provides more than half the effort.


6-Isichshnz-ukmhxm does ALL the effort. Patient does none of the effort to 

complete the activity. Or, the assistance of 2 or more helpers is required for 

the patient to complete the  


    activity.


If activity was not attempted, code reason:


7-Patient Refused.


9-Not Applicable-not attempted and the patient did not perform the activity 

before the current illness, exacerbation or injury.


10-Not Attempted due to Environmental Limitations-(lack of equipment, weather 

restraints, etc.).


88-Not Attempted due to Medical Conditions or Safety Concerns.


Roll Left to Right (QC):  4


Sit to Lying (QC):  4


Sit to Stand (QC):  3


Chair/Bed-to-Chair Xfer(QC):  4


Car Transfer (QC):  3





Gait Training


Does the Patient Walk?:  Yes


Distance:  40ft x3


Walk 10 feet (QC):  3


Walk 50 ft with 2 Turns(QC):  3


Walk 150 ft (QC):  88


Walking 10ft/uneven surface-QC:  88


Gait Persons Needed:  1


Gait Assistive Device:  Walker Platform





Wheelchair Training


Does the Pt Use a Wheelchair?:  Yes


Distance:  150'x3


Wheel 50 ft with 2 turns (QC):  3


Wheel 150 ft (QC):  5


Type of Wheelchair:  Manual





Stair Training


1 Step (curb) (QC):  88


4 Steps (QC):  88


12 Steps (QC):  88





Balance


Picking up an Object (QC):  88





ADL-Treatment


Eating (QC):  4


Oral Hygiene (QC):  5


Bathing Location:  L Arm, R Arm, L Upper Leg, R Upper Leg, L Lower Leg 

(including foot), R Lower Leg (including foot), Chest, Abdomen, Buttocks, 

Perineal Area


Shower/Bathe Self (QC):  4


Upper Body Dressing (QC):  3


Lower Body Dressing (QC):  2


On/Off Footwear (QC):  3


Toileting Hygiene (QC):  7


Toilet Transfer (QC):  7 (Pt. indicates at begining and end of treatment that 

she does not need to use bathroom.)





Assessment/Plan


Assessment and Plan


Assess & Plan/Chief Complaint


Assessment per Luke Bainbridge, MSIII:


Critical illness myopathy


S/P CVA involving left occipital artery


S/P 3 vessel CABG


Debility


Expressive aphasia


Dysarthria


Right sided weakness


Diabetes mellitus


COPD


CAD


Dysphagia requiring PEG now eating well


Dumont cath in place now DC and voiding well on Urecholine





Plan:


1. Ensure adequate anticoagulation


2. PT, OT, and SLP eval's and treat


3. Oxygen via NC- wean as tolerated


4. Resume Home medications


5. Fall precautions


6. Dietary consult


7. DVT prophylaxis





11/13/20:


Patient participating well


No pain reported


Continue current meds


Advance diet





11/14/20:


Dumont DC soon


Continue home meds


Therapies to continue protocol





11/15/20:


Dr Tawil appreciated


Monitor BP


Monitor labs





11/16/20:


Bladder meds


Appreciate Dr Tawil


PT OT ST





11/17/20:


Voiding trial


Monitor closely


Variable moods





11/18/20:


Monitor BP


Encourage PO intake


Dumont cath management





11/19/20:


Encourage participation with therapies


Monitor dumont cath





11/20/20:


Monitor closely


Bladder management





11/21/20:


Monitor closely


Cath maintained?





11/22/20:


Cath in place


Check labs in am


Increased dose of Urecholine





11/23/20:


Monitor bladder function


Increased dose of Urecholine





11/24/20:


Maintain dumont catheter


IRF protocol


Family evaluating DC plans





11/25/20:


DC next week


Dumont cath in place now


Monitor for falls





11/26/20:


Voiding well without cath now


Monitor closely





11/27/20:


DC accuchecks


Dramatic improvement since admit





11/28/20:


Monitor dysphagia


PT OT


Monitor voiding





11/29/20:


Decrease Urecholine


Monitor BP


Fall risk





11/30/20:


Monitor voiding


Fall risk


DC later this week





(1) CVA (cerebral vascular accident)


(2) Dysarthria


(3) Dysphagia


(4) PEG (percutaneous endoscopic gastrostomy) status


(5) Expressive aphasia


(6) Smoker


(7) COPD (chronic obstructive pulmonary disease)


(8) CAD (coronary artery disease)


(9) Hx of CABG


(10) Dumont catheter in place


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











ANDREY BAH DO                Nov 30, 2020 08:19

## 2020-11-30 NOTE — PROGRESS NOTE - UROLOGY
Progress Note-Urology


Progress Notes/Assess & Plan


Progress/Assessment & Plan


CONTINUES WELL ON WEANING


Final Diagnosis


RETENTION











TAWIL,ELIAS A MD               Nov 30, 2020 12:27

## 2020-11-30 NOTE — PHYSICAL THERAPY DAILY NOTE
PT Daily Note-Current


Subjective


Pt. up in recliner, agrees to Rx, "OK baby", pt. does try to express that she is

tired.





Pain





   Location:  No Pain Reported





Mental Status


Patient Orientation:  Non-Verbal/Aphasic


Attachments:  Other-See Comments (mask while out of room)





Transfers


SCALE: Activities may be completed with or without assistive devices.





6-Indepedent-patient completes the activity by him/herself with no assistance 

from a helper.


5-Set-up or Clean-up Assistance-helper sets up or cleans up; patient completes 

activity. [place] assists only prior to or  


    following the activity.


4-Supervision or Touching Assistance-helper provides verbal cues and/or 

touching/steadying and/or contact guard assistance as patient completes 

activity. Assistance may be provided   


    throughout the activity or intermittently.


3-Partial/Moderate Assistance-helper does LESS THAN HALF the effort. [place] 

lifts, holds or supports trunk or limbs, but provides less than half the effort.


2-Substantial/Maximal Assistance-helper does MORE THAN HALF the effort. [place] 

lifts or holds trunk or limbs and provides more than half the effort.


1-Zrayyudzr-cxxnhi does ALL the effort. Patient does none of the effort to 

complete the activity. Or, the assistance of 2 or more helpers is required for 

the patient to complete the  


    activity.


If activity was not attempted, code reason:


7-Patient Refused.


9-Not Applicable-not attempted and the patient did not perform the activity 

before the current illness, exacerbation or injury.


10-Not Attempted due to Environmental Limitations-(lack of equipment, weather 

restraints, etc.).


88-Not Attempted due to Medical Conditions or Safety Concerns.


Roll Left & Right (QC):  6


Sit to Lying (QC):  6


Lying to Sitting/Side of Bed(Q:  4


Sit to Stand (QC):  6


Chair/Bed-to-Chair Xfer(QC):  4





Weight Bearing


Right Lower Extremity:  Right


Full Weight Bearing


Left Lower Extremity:  Left


Full Weight Bearing





Gait Training


Does the Patient Walk?:  Yes


Walk 10 feet (QC):  4


Walk 50 ft with 2 Turns(QC):  4


Walk 150 ft (QC):  4


Gait Persons Needed:  1


Gait Assistive Device:  Walker Platform


gait improved since last seen by this PTA.  Pt using FWW with platf and needs 

instruction and tactile nudges to position in walker for safety and to keep 

right foot from bumping in to FWW , leans heavily into FWW with flexed posture





Wheelchair Training


Does the Pt Use a Wheelchair?:  Yes


Wheel 50 ft with 2 turns (QC):  3


Type of Wheelchair:  Manual


needs much assist to guide the w/c





Exercises


Supine Ex:  Bridging, Rolling, Heel Slides, Hip abd/add


Supine Reps:  15


Seated Therapy Exercises:  Ankle pumps, Sit to stand, Long arc quads, Hip 

flexion


Seated Reps:  15


NuStep Minutes:  5


 NuStep Workload:  2





Assessment


Current Status:  Good Progress


gait improved, TRF SPT to left near Mod I, to right min assist to CGA





PT Short Term Goals


Short Term Goals


Time Frame:  2020


Roll Left & Right:  4


Sit to lyin (met)


Lying to sitting on side of be:  3


Chair/bed-to-chair transfer:  3 (met)


Walk 10 feet:  3 (met)


Does pt use a wc or scooter:  Yes


Wheel 50ft w/2 turns:  3


Wheel 150 feet:  3





PT Long Term Goals


Long Term Goals


PT Long Term Goals Time Frame:  Dec 3, 2020


Roll Left & Right (QC):  6


Sit to Lying (QC):  6


Lying-Sitting on Side/Bed(QC):  6


Sit to Stand (QC):  4


Chair/Bed-to-Chair Xfer(QC):  4


Toilet Transfer (QC):  4


Car Transfer (QC):  4


Does the Patient Walk:  Yes


Walk 10 feet (QC):  3


Walk 50ft with 2 Turns (QC):  3


Walk 150 ft (QC):  88


Walking 10ft on Uneven Surface:  3


1 Step (curb) (QC):  3


4 Steps (QC):  88


12 Steps (QC):  88


Picking up an Object (QC):  88


Does the Pt use WC or Scooter?:  Yes


Wheel 50 feet with 2 turns (QC:  6


Wheel 150 feet:  6





PT Plan


Treatment/Plan


Treatment Plan:  Continue Plan of Care


Treatment Plan:  Bed Mobility, Education, Functional Activity Nona, Functional 

Strength, Group Therapy, Gait, Safety, Therapeutic Exercise, Transfers


Treatment Duration:  2020


Frequency:  At least 5 of 7 days/Wk (IRF)


Estimated Hrs Per Day:  1.5 hours per day


Patient and/or Family Agrees t:  Yes





Safety Risks/Education


Patient Education:  Gait Training, Transfer Techniques, Correct Positioning, W/C

Management, Disease Process, Safety Issues


Teaching Recipient:  Patient


Teaching Methods:  Demonstration, Discussion


Response to Teaching:  Verbalize Understanding, Return Demonstration, 

Reinforcement Needed





Time/GCodes


Time In:  900


Time Out:  1000


Total Billed Treatment Time:  60


Total Billed Treatment


1,WC15,GT15,EX15,FA15











ARRON WHATLEY PTA             2020 09:58

## 2020-11-30 NOTE — SPEECH THERAPY DAILY NOTE
Speech Daily Progress Note


Subjective


Date Seen by Provider:  Nov 30, 2020


Time Seen by Provider:  00:30


Patient was resting in her recliner when I entered her room. She requested 

another cup of coffee which I brought her.





Objective


Patient completed a series of pictures "what's missing?" with 60% word finding 

given 25% verbal and/or visual cuing.





Assessment


Assessment Current Status:  Fair Progress





Treatment Plan


Continue Plan of Care





Speech Short Term Goals


Short Term Goals


Short Term Goals


1) The patient will complete cognitive tasks related to memory, safety awareness

and problem solving at 80% with minimal cues.


2) The patient will complete speech tasks  to improve intelligibility at 80% 

with minimal cues.


3) The patient will complete confrontational naming tasks at 90% with minimal 

cues.


4) The patient will complete OME for improved oral status for safe oral intake 

at 80% or greater with minimal cues.





Speech Long Term Goals


Long Term Goals


Patient will improve communication abilities and safe oral intake in order to 

return to prior level.





Speech-Plan


Patient/Family Goals


Patient/Family Goals:  


Patient is scheduled to discharge to live with her daughter on 12/4/2020.





Treatment Plan


Speech Therapy Treatment Plan:  Continue Plan of Care


Treatment Duration:  Nov 25, 2020


Frequency:  4 times per week (Patient will receive ST 4-5x per week)


Estimated Hrs Per Day:  .5 hour per day


Rehab Potential:  Fair


Barriers to Learning:  


Patient's expressive aphasia, age


Pt/Family Agrees to Plan:  Yes





Safety Risks/Education


Teaching Recipient:  Patient


Teaching Methods:  Demonstration, Discussion


Response to Teaching:  Verbalize Understanding, Return Demonstration


Education Topics Provided:  


Continued safety within her room and with oral intake.





Time


Speech Therapy Time In:  08:30


Speech Therapy Time Out:  09:00


Total Billed Time:  30


Billed Treatment Time


1, SEAN, FRANCHESKA Jacob            Nov 30, 2020 10:29

## 2020-12-01 NOTE — NUR
CM/SS DISCHARGE PLANNING

Patient's discharge date is this Friday, 12/4/20.

Confirmed with daughter Monik Chahal they are prepared for her arrival.



DME:  Ordered Right Platform FWW and Wheelchair through [facility].  Agency to explore Medicare 
benefits, requesting wheelchair from Medicare and private pay platform walker.  



HHC:  Referral completed with [facility] for RN PT OT ST, other closest agencies in 
service area do not have speech therapist.



CAREGIVERS: Monik reports she has not been able to secure private pay caregivers due to 
shortage of persons available in their service area.  She is working in partnership with her 
boss to try to work from home so that she can be there with her mom and/or go into work 
after her spouse comes home.



KANCARE:  Application submitted, Monik states she was able to speak with someone who 
indicated they would move the request into an expedited category.  The goal is to get the 
HCBS Waiver and in-home caregivers funded by Medicaid.



Continue toward finalization of all post hospital resources and supports.

## 2020-12-01 NOTE — PROGRESS NOTE - CARDIOLOGY
Cardiology SOAP Progress Note


Subjective:


Sitting up in recliner at the bedside.  No c/o CP, dyspnea, palpitations, 

syncope or near syncope.





Objective:


I&O/Vital Signs











 12/2/20





 05:17


 


Temp 36.1


 


Pulse 82


 


Resp 18


 


B/P (MAP) 149/67 (94)


 


Pulse Ox 96


 


O2 Delivery Room Air














 12/2/20





 00:00


 


Intake Total 720 ml


 


Balance 720 ml








Constitutional:  AAO x 3, well-developed, well-nourished, other (mild expressive

aphasia)


Respiratory:  No accessory muscle use, No respiratory distress; chest expansion 

is symmetric, chest is bilaterally symmetric, lungs clear to auscultation


Cardiovascular:  regular rate-rhythm; No JVD; S1 and S2


Gastrointestional:  No tender; soft, round, audible bowel sounds


Extremities:  no lower extremity edema bilateral


Neurologic/Psychiatric:  other (RUE and RLE weakness)





Results/Procedures:


Labs








A/P:


Assessment:


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

aphasia -  Plavix and ASA.





Coronary artery disease status post CABG 3





Hypertension





Hyperlipidemia





Questionable peripheral arterial disease with ischemic event after her bypass 

has improved after conservative management, continue on Plavix





Diabetes mellitus, followed and managed by primary care physician





Debility, receiving physical therapy


Plan:


Continue current cardiac regimen


Monitor lab











RASHAUN MONTOYA            Dec 1, 2020 14:23

## 2020-12-01 NOTE — PHYSICAL THERAPY DAILY NOTE
PT Daily Note-Current


Subjective


Pt is agreeable to PT.  Throughout treatment, pt often says, "oh good" and "okay

baby".





Pain





   Numeric Pain Scale:  0-No Pain


   Location:  No Pain Reported





Transfers


SCALE: Activities may be completed with or without assistive devices.





6-Indepedent-patient completes the activity by him/herself with no assistance 

from a helper.


5-Set-up or Clean-up Assistance-helper sets up or cleans up; patient completes 

activity. [place] assists only prior to or  


    following the activity.


4-Supervision or Touching Assistance-helper provides verbal cues and/or 

touching/steadying and/or contact guard assistance as patient completes 

activity. Assistance may be provided   


    throughout the activity or intermittently.


3-Partial/Moderate Assistance-helper does LESS THAN HALF the effort. Kem[place]rer 

lifts, holds or supports trunk or limbs, but provides less than half the effort.


2-Substantial/Maximal Assistance-helper does MORE THAN HALF the effort. [place]rer 

lifts or holds trunk or limbs and provides more than half the effort.


0-Kqiyqocnm-wanpss does ALL the effort. Patient does none of the effort to 

complete the activity. Or, the assistance of 2 or more helpers is required for 

the patient to complete the  


    activity.


If activity was not attempted, code reason:


7-Patient Refused.


9-Not Applicable-not attempted and the patient did not perform the activity 

before the current illness, exacerbation or injury.


10-Not Attempted due to Environmental Limitations-(lack of equipment, weather 

restraints, etc.).


88-Not Attempted due to Medical Conditions or Safety Concerns.


Sit to Stand (QC):  4 (CGA with cues for sequencing.  Pt requires reminders to 

push up from the chair. )


Chair/Bed-to-Chair Xfer(QC):  3 (min assist to manage the walker, safety with 

backing up and cues for safety.  )


Car Transfer (QC):  3 (min assist to manage the walker with heavy cues for 

sequencing and safety.  Pt able to lift right leg in car but with cues to 

initiate. )





Weight Bearing


Right Lower Extremity:  Right


Full Weight Bearing


Left Lower Extremity:  Left


Full Weight Bearing





Gait Training


Does the Patient Walk?:  Yes


Walk 10 feet (QC):  4


Walk 50 ft with 2 Turns(QC):  4


Walk 150 ft (QC):  4 (CGA with intermitted cues for sequencing and safety.  

intermittent assist to manage the walker. )


Gait Assistive Device:  Walker Platform


Decreased heel strike/toe off B with shuffled gait with decreased step length.  

tends to walk outside the walker and needs intermittent assist to manage the 

walker.  Requires assist to avoid obstacles with verbal and tactile cues.





Stair Training


 Stair Training: Handrails/:  uses walker


1 Step (curb) (QC):  3 (min assist for balance; 100% cues for sequencing and 

safety.  performed x 2 reps.  used the short pink step in gym)





Exercises


NuStep Minutes:  12 (working on LE strength and functional act tolerance as well

as coordinated U/LE movement. )





Treatments


Functional transfers, functional gait, steps, functional strengthening and 

repetition with sequencing on multiple kinds of sit to stand transfers. Pt is 

motivated and cooperative.  Continued skilled therapy services will enhance 

patient mobility and further decrease the burden of caregiver support at 

discharge.





Assessment


Current Status:  Good Progress


Pt is progressing well.  Her gait has improved, although still requires much 

assist.  She is impulsive at times but corrects easily with cues and seems to 

follow cues well.  She does have some right side neglect but will attend with 

cues.





PT Short Term Goals


Short Term Goals


Time Frame:  2020


Roll Left & Right:  4


Sit to lyin (met)


Lying to sitting on side of be:  3


Chair/bed-to-chair transfer:  3 (met)


Walk 10 feet:  3 (met)


Does pt use a wc or scooter:  Yes


Wheel 50ft w/2 turns:  3


Wheel 150 feet:  3





PT Long Term Goals


Long Term Goals


PT Long Term Goals Time Frame:  Dec 3, 2020


Roll Left & Right (QC):  6


Sit to Lying (QC):  6


Lying-Sitting on Side/Bed(QC):  6


Sit to Stand (QC):  4


Chair/Bed-to-Chair Xfer(QC):  4


Toilet Transfer (QC):  4


Car Transfer (QC):  4


Does the Patient Walk:  Yes


Walk 10 feet (QC):  3 (met)


Walk 50ft with 2 Turns (QC):  3 (met)


Walk 150 ft (QC):  88


Walking 10ft on Uneven Surface:  3


1 Step (curb) (QC):  3


4 Steps (QC):  88


12 Steps (QC):  88


Picking up an Object (QC):  88


Does the Pt use WC or Scooter?:  Yes


Wheel 50 feet with 2 turns (QC:  6


Wheel 150 feet:  6





PT Plan


Problem List


Problem List:  Activity Tolerance, Functional Strength, Safety, Balance, Gait, 

Transfer, Bed Mobility





Treatment/Plan


Treatment Plan:  Continue Plan of Care


Treatment Plan:  Bed Mobility, Education, Functional Activity Nona, Functional 

Strength, Group Therapy, Gait, Safety, Therapeutic Exercise, Transfers


Treatment Duration:  2020


Frequency:  At least 5 of 7 days/Wk (IRF)


Estimated Hrs Per Day:  1.5 hours per day


Patient and/or Family Agrees t:  Yes





Safety Risks/Education


Patient Education:  Transfer Techniques, Safety Issues


Teaching Recipient:  Patient


Teaching Methods:  Demonstration, Discussion


Response to Teaching:  Return Demonstration, Reinforcement Needed





Discharge Recommendations


Therapy Discharge Recommendati:  Post Acute PT ([facility] PT)





Time/GCodes


Time In:  845


Time Out:  1000


Total Billed Treatment Time:  75


Total Billed Treatment


visit


GT 30


FA 30


EX 15











BENTLEY PIERCE PT              Dec 1, 2020 10:01

## 2020-12-01 NOTE — PROGRESS NOTE - UROLOGY
Progress Note-Urology


Progress Notes/Assess & Plan


Progress/Assessment & Plan


DC URECHOLINE


Final Diagnosis


RETENTION (RESOLVED)











TAWIL,ELIAS A MD                Dec 1, 2020 10:45

## 2020-12-01 NOTE — SPEECH THERAPY DAILY NOTE
Speech Daily Progress Note


Subjective


Date Seen by Provider:  Dec 1, 2020


Time Seen by Provider:  00:30


Patient was sitting up in her recliner eating her lunch.





Objective


Patient demonstrated utilizing compensatory strategies as trained at 90% with 

minimal cues.





Assessment


Assessment Current Status:  Good Progress





Treatment Plan


Continue Plan of Care





Speech Short Term Goals


Short Term Goals


Short Term Goals


1) The patient will complete cognitive tasks related to memory, safety awareness

and problem solving at 80% with minimal cues.


2) The patient will complete speech tasks  to improve intelligibility at 80% 

with minimal cues.


3) The patient will complete confrontational naming tasks at 90% with minimal 

cues.


4) The patient will complete OME for improved oral status for safe oral intake 

at 80% or greater with minimal cues.





Speech Long Term Goals


Long Term Goals


Patient will improve communication abilities and safe oral intake in order to 

return to prior level.





Speech-Plan


Patient/Family Goals


Patient/Family Goals:  


Patient is scheduled to discharge on 12/4/2020 to live with her daughter.





Treatment Plan


Speech Therapy Treatment Plan:  Continue Plan of Care


Treatment Duration:  Nov 25, 2020


Frequency:  4 times per week (Patient will receive ST 4-5x per week)


Estimated Hrs Per Day:  .5 hour per day


Rehab Potential:  Fair


Barriers to Learning:  


Patient's expressive aphasia, medical status


Pt/Family Agrees to Plan:  Yes





Safety Risks/Education


Teaching Recipient:  Patient


Teaching Methods:  Demonstration, Discussion


Response to Teaching:  Verbalize Understanding, Return Demonstration


Education Topics Provided:  


Continued safety with oral intake, communication of wants/needs





Time


Speech Therapy Time In:  12:30


Speech Therapy Time Out:  13:00


Total Billed Time:  30


Billed Treatment Time


1, SLDALJIT, DYST


FRANCHESKA Lau             Dec 1, 2020 12:40

## 2020-12-01 NOTE — OCCUPATIONAL THER DAILY NOTE
OT Current Status-Daily Note


Subjective


No pain reported.





Mental Status/Objective


Patient Orientation:  Person





ADL-Treatment


Therapy Code Descriptions/Definitions 





Functional [place] Measure:


0=Not Assessed/NA        4=Minimal Assistance


1=Total Assistance        5=Supervision or Setup


2=Maximal Assistance  6=Modified [place]


3=Moderate Assistance 7=Complete IndependenceSCALE: Activities may be completed 

with or without assistive devices.





6-Indepedent-patient completes the activity by him/herself with no assistance 

from a helper.


5-Set-up or Clean-up Assistance-helper sets up or cleans up; patient completes 

activity. [place] assists only prior to or  


    following the activity.


4-Supervision or Touching Assistance-helper provides verbal cues and/or 

touching/steadying and/or contact guard assistance as patient completes 

activity. Assistance may be provided   


    throughout the activity or intermittently.


3-Partial/Moderate Assistance-helper does LESS THAN HALF the effort. [place] 

lifts, holds or supports trunk or limbs, but provides less than half the effort.


2-Substantial/Maximal Assistance-helper does MORE THAN HALF the effort. [place] 

lifts or holds trunk or limbs and provides more than half the effort.


7-Hamworfld-ylwwhl does ALL the effort. Patient does none of the effort to 

complete the activity. Or, the assistance of 2 or more helpers is required for 

the patient to complete the  


    activity.


If activity was not attempted, code reason:


7-Patient Refused.


9-Not Applicable-not attempted and the patient did not perform the activity 

before the current illness, exacerbation or injury.


10-Not Attempted due to Environmental Limitations-(lack of equipment, weather 

restraints, etc.).


88-Not Attempted due to Medical Conditions or Safety Concerns.





Other Treatment


Pt. up in chair.  Agrees to work with OT.  Pt. transfers to wheelchair from 

reclining chair with min assist for sit-stand.  OT encouraged pt. to self propel

wheelchair to therapy gym.  Pt. has difficulty with luz of this.  OT takes 

her to gym and engages pt. in series of different activities to promote fine 

motor coordination, use of right UE, crossing midline, bilateral coordination, 

attending to right side, and cognition.  Pt. having great difficulty this date 

and becomes upset.  Pt. unable to name specific pieces of food, or point to them

when asked what they are.  She is unable to understand rhythm of reciprocal 

movements with UE, when OT uses ace wrap to put right hand on pulley system.  OT

removes this and moves to next activity.  Max cues given for fine motor and 

visual activity with matching specific shaped plastic piece with corresponding 

shape.  This was still very difficult for pt.  Nursing notified and okay with OT

taking pt. to first floor, and working on relaxation activities.  Engaged pt. in

looking at fish in tank, and attending to right side.  Went to chapel and 

attempted to talk with pt. and provide encouragement.  Pt. indicates that she is

upset, and knows that things "are different."  Continue to encourage pt. and pt.

is taken back to therapy gym on second floor.  Pt. stood at parallel bars and 

practiced weight shifts, picking up feet in place, and looking at self in 

mirror.  Max cues given to sequence these steps.  Taken back to room and 

transferred to reclining chair with min assist.  All needs met.





Education


OT Patient Education:  Correct positioning, Exercise program, Modified ADL 

techniques, Progress toward Goal/Update tx plan, Purpose of tx/functional 

activities, Reviewed precautions, Rehab process, Transfer techniques


Teaching Recipient:  Patient


Teaching Methods:  Demonstration, Discussion


Response to Teaching:  Verbalize Understanding, Return Demonstration





OT Short Term Goals


Short Term Goals


Time Frame:  2020


Eating:  3


Oral hygiene:  3


Toileting hygiene:  3


Shower/bathe self:  3


Upper body dressin


Lower body dressing:  3


Putting on/taking off footwear:  3





OT Long Term Goals


Long Term Goals


Time Frame:  Dec 10, 2020


Eating (QC):  4


Oral Hygiene (QC):  4


Toileting Hygiene (QC):  3


Shower/Bathe Self (QC):  3


Upper Body Dressing (QC):  5


Lower Body Dressing (QC):  4


On/Off Footwear (QC):  4


Additional Goals:  1-Demonstrate ADL Tasks, 2-Verbalize Understanding, 3-

ImproveStrength/Nona


1=Demonstrate adherence to instructed precautions during ADL tasks.


2=Patient will verbalize/demonstrate understanding of assistive 

devices/modifications for ADL.


3=Patient will improve strength/tolerance for activity to enable patient to 

perform ADL's.





OT Education/Plan


Problem List/Assessment


Assessment:  Decreased Activ Tolerance, Decreased Safety Aware, Decreased UE Str

ength, Dependent Transfers, Impaired Cognition, Impaired Coordination, Impaired 

Funct Balance, Impaired I ADL's, Impaired Self-Care Skills, Restricted Funct UE 

ROM, Visual-Perceptual Deficit


Visual perceptual issues difficult to pinpoint due to severe global aphasia.





Discharge Recommendations


Plan/Recommendations:  Continue POC





Treatment Plan/Plan of Care


Treatment,Training & Education:  Yes


Patient would benefit from OT for education, treatment and training to promote 

independence in ADL's, mobility, safety and/or upper extremity function for 

ADL's.


Plan of Care:  ADL Retraining, Caregiver Training, Cognitive Retraining, 

Functional Mobility, Group Exercise/Act as Ind, UE Funct Exercise/Act, UE 

Neuromus Re-Ed/Coord, Visual/Perceptual Retrain, W/C Management Training


Treatment Duration:  Dec 10, 2020


Frequency:  At least 5 of 7 days/Wk (IRF)


Estimated Hrs Per Day:  1.5 hours per day


Agreement:  Yes


Rehab Potential:  Fair





Time/GCodes


Start Time:  09:55


Stop Time:  11:10


Total Time Billed (hr/min):  75


Billed Treatment Time


1, FA x 75minutes











ROSANNA LOVING OT            Dec 1, 2020 11:55

## 2020-12-01 NOTE — PROGRESS NOTE - CARDIOLOGY
Cardiology SOAP Progress Note


Subjective:


No cp or palp or syncope or shortness of breath at rest


Gen weakness and malaise present





Objective:


I&O/Vital Signs











 12/1/20 12/1/20 12/1/20 12/1/20





 06:35 08:37 08:54 09:00


 


Temp 36.2   


 


Pulse 84  78 


 


Resp 20   


 


B/P (MAP) 164/71 (102)  139/60 (86) 


 


Pulse Ox 98 98  


 


O2 Delivery Room Air Room Air  Room Air














 12/1/20





 00:00


 


Intake Total 960 ml


 


Balance 960 ml








Constitutional:  AAO x 3, well-developed, well-nourished, other (mild expressive

aphasia)


Respiratory:  No accessory muscle use, No respiratory distress; chest expansion 

is symmetric, chest is bilaterally symmetric, lungs clear to auscultation


Cardiovascular:  regular rate-rhythm; No JVD; S1 and S2


Gastrointestional:  No tender; soft, round, audible bowel sounds


Extremities:  no lower extremity edema bilateral


Neurologic/Psychiatric:  other (RUE and RLE weakness)





Results/Procedures:


Labs





Laboratory Tests


11/30/20 04:45











A/P:


Assessment:





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

aphasia -  Plavix and ASA.





Coronary artery disease status post CABG 3





Hypertension





Hyperlipidemia





Questionable peripheral arterial disease with ischemic event after her bypass 

has improved after conservative management, continue on Plavix





Diabetes mellitus, followed and managed by primary care physician





Debility, receiving physical therapy


Plan:





Continue current cardiac regimen


Monitor lab


We have reviewed Dr. Dodge's progress notes











MENDOZA GIFFORD MD FACP FAC CCDS    Dec 1, 2020 16:27

## 2020-12-01 NOTE — PM&R PROGRESS NOTE
Subjective


HPI/CC On Admission


Date Seen by Provider:  Dec 1, 2020


Time Seen by Provider:  08:30


Subjective/Events-last exam


12/1/20:


Pt dramatically better


Walking very well


Doing very well


Voiding well


BP was a little bit elevated earlier but now its normal








11/30/20:


Labs are okay 


Bowels moved yesterday 


Urinating well, no urinary retention 


Discharge planned later this week 








11/29/20:


Decreasing Urecholine per Dr Tawil now


No pain reported


No falls


No incontinence








11/28/20:


Not voiding very often


Doesn't drink much during day


No pain reported








11/27/20:


Improved status


Feeding herself now


Dramatic improvement since admit


No incontinence now


DC accucheck BID








11/26/20:


Voiding well now


No catheter now


Made her way to the commode on her own without help last night and urinated in 

her clothes on top of the commode


No incontinence usually








11/25/20:


Pt doing pretty well


Dumont is out, will evaluate whether she can void or not on her own


Urocholine is at 50mg AC and HS


Bowels are really moving well








11/24/20:


Pt doing pretty well 


Re-inserted dumont due to retention 


Yeast infection will be managed with Diflucan and Monistat cream








11/23/20:


Pt still doing pretty well 


Participation in therapy varies 


Cognition is an issue 


Overall doing well enough to remain stable 








11/22/20:


Dumont cath still in place


Urecholine 50mg dose now


BM today








11/21/20:


Likely will need catheter replaced


No pain reported


Feeder maintained adequate consumption








11/20/20:


Patient about the same


Requires a feeder to eat


No pain reported


Confusion is a limitation








11/19/20:


Pt has varying participation with PT and OT 


Accu cheks changed to BID 


Will monitor pt closely 


Denies any pain 


Is a feeder








11/18/20:


Re inserted the dumont since urinary retention continued


Increasing oral fluids


Urecholine was increased dose wise


Bowels are moving








11/17/20:


Xanax given last night 


Bowels moved yesterday 


Refusing to eat supper or breakfast today 


Agitated a bit today 


Post-void residual had 238


Hadnt voided since discontinued the cath yesterday 








11/16/20:


Dr. Tawil will discontinue the catheter and evaluate voiding trial today 


Flomax and Urecholine maintained 


Bowels are moving 


Very difficult to recover from catastrophic stroke








11/15/20:


Patient participating with therapy


No pain reported


Dr Tawil consulted for dumont catheter








11/14/20:


Patient denies pain


Working well with structured therapies


Dumont cath still in place will need to DC soon


Working on BM regimen








Patient doing well


Settling in well


No pain reported


Working with PT OT and ST


Eating a bit more without aspiration


No falls


Checked meds and labs


Conferred with RN


Reviewed therapy notes





Review of Systems


Neurological:  Weakness, Incoordination





Objective


Exam


Vital Signs





Vital Signs








  Date Time  Temp Pulse Resp B/P (MAP) Pulse Ox O2 Delivery O2 Flow Rate FiO2


 


12/1/20 20:00      Room Air  


 


12/1/20 18:57     96   


 


12/1/20 17:40 36.4 74 18 144/64 (90)    





Capillary Refill : Less Than 3 Seconds


General Appearance:  No Apparent Distress, Anxious, Chronically ill, Thin


HEENT:  PERRL/EOMI, Normal ENT Inspection, Pharynx Normal


Neck:  Full Range of Motion, Normal Inspection, Non Tender, Supple, Carotid 

Bruit


Respiratory:  Chest Non Tender, Lungs Clear, No Accessory Muscle Use, No 

Respiratory Distress, Decreased Breath Sounds


Cardiovascular:  Regular Rate, Rhythm, No Edema, No Gallop, No JVD, No Murmur, 

Normal Peripheral Pulses


Gastrointestinal:  Normal Bowel Sounds, No Organomegaly, No Pulsatile Mass, Non 

Tender, Soft


Back:  Normal Inspection, No CVA Tenderness, No Vertebral Tenderness


Extremity:  Normal Capillary Refill, Normal Inspection, Normal Range of Motion 

(except right sided weakness), Non Tender, No Calf Tenderness, No Pedal Edema


Neurologic/Psychiatric:  Alert, No Motor/Sensory Deficits, Normal Mood/Affect, 

Abnormal CNs II-XII, Aphasia, Facial Droop, Motor Weakness (right sided 

flaccidity)


Skin:  Normal Color, Warm/Dry


Lymphatic:  No Adenopathy





Results/Procedures


Lab


Patient resulted labs reviewed.





FIM


Transfers


Therapy Code Descriptions/Definitions 





Functional [place] Measure:


0=Not Assessed/NA        4=Minimal Assistance


1=Total Assistance        5=Supervision or Setup


2=Maximal Assistance  6=Modified [place]


3=Moderate Assistance 7=Complete IndependenceSCALE: Activities may be completed 

with or without assistive devices.





6-Indepedent-patient completes the activity by him/herself with no assistance 

from a helper.


5-Set-up or Clean-up Assistance-helper sets up or cleans up; patient completes 

activity. [place] assists only prior to or  


    following the activity.


4-Supervision or Touching Assistance-helper provides verbal cues and/or touching

/steadying and/or contact guard assistance as patient completes activity. 

Assistance may be provided   


    throughout the activity or intermittently.


3-Partial/Moderate Assistance-helper does LESS THAN HALF the effort. [place] 

lifts, holds or supports trunk or limbs, but provides less than half the effort.


2-Substantial/Maximal Assistance-helper does MORE THAN HALF the effort. [place] 

lifts or holds trunk or limbs and provides more than half the effort.


9-Vqfayjias-njqgcm does ALL the effort. Patient does none of the effort to 

complete the activity. Or, the assistance of 2 or more helpers is required for 

the patient to complete the  


    activity.


If activity was not attempted, code reason:


7-Patient Refused.


9-Not Applicable-not attempted and the patient did not perform the activity 

before the current illness, exacerbation or injury.


10-Not Attempted due to Environmental Limitations-(lack of equipment, weather 

restraints, etc.).


88-Not Attempted due to Medical Conditions or Safety Concerns.


Roll Left to Right (QC):  6


Sit to Lying (QC):  6


Sit to Stand (QC):  6


Chair/Bed-to-Chair Xfer(QC):  4


Car Transfer (QC):  3





Gait Training


Does the Patient Walk?:  Yes


Distance:  40ft x3


Walk 10 feet (QC):  4


Walk 50 ft with 2 Turns(QC):  4


Walk 150 ft (QC):  4


Walking 10ft/uneven surface-QC:  88


Gait Persons Needed:  1


Gait Assistive Device:  Walker Platform





Wheelchair Training


Does the Pt Use a Wheelchair?:  Yes


Distance:  150'x3


Wheel 50 ft with 2 turns (QC):  3


Wheel 150 ft (QC):  5


Type of Wheelchair:  Manual





Stair Training


1 Step (curb) (QC):  88


4 Steps (QC):  88


12 Steps (QC):  88





Balance


Picking up an Object (QC):  88





ADL-Treatment


Eating (QC):  4


Oral Hygiene (QC):  5


Bathing Location:  L Arm, R Arm, L Upper Leg, R Upper Leg, L Lower Leg 

(including foot), R Lower Leg (including foot), Chest, Abdomen, Buttocks, 

Perineal Area


Shower/Bathe Self (QC):  3 (Mod assist overall to shower and wash all parts.  

Max cues.)


Upper Body Dressing (QC):  2


Lower Body Dressing (QC):  2


On/Off Footwear (QC):  2


Toileting Hygiene (QC):  7


Toilet Transfer (QC):  7 (Pt. indicates at begining and end of treatment that 

she does not need to use bathroom.)





Assessment/Plan


Assessment and Plan


Assess & Plan/Chief Complaint


Assessment per Luke Bainbridge, MSIII:


Critical illness myopathy


S/P CVA involving left occipital artery


S/P 3 vessel CABG


Debility


Expressive aphasia


Dysarthria


Right sided weakness


Diabetes mellitus


COPD


CAD


Dysphagia requiring PEG now eating well


Dumont cath in place now DC and voiding well on Urecholine





Plan:


1. Ensure adequate anticoagulation


2. PT, OT, and SLP eval's and treat


3. Oxygen via NC- wean as tolerated


4. Resume Home medications


5. Fall precautions


6. Dietary consult


7. DVT prophylaxis





11/13/20:


Patient participating well


No pain reported


Continue current meds


Advance diet





11/14/20:


Dumont DC soon


Continue home meds


Therapies to continue protocol





11/15/20:


Dr Tawil appreciated


Monitor BP


Monitor labs





11/16/20:


Bladder meds


Appreciate Dr Tawil


PT OT ST





11/17/20:


Voiding trial


Monitor closely


Variable moods





11/18/20:


Monitor BP


Encourage PO intake


Dumont cath management





11/19/20:


Encourage participation with therapies


Monitor dumont cath





11/20/20:


Monitor closely


Bladder management





11/21/20:


Monitor closely


Cath maintained?





11/22/20:


Cath in place


Check labs in am


Increased dose of Urecholine





11/23/20:


Monitor bladder function


Increased dose of Urecholine





11/24/20:


Maintain dumont catheter


IRF protocol


Family evaluating DC plans





11/25/20:


DC next week


Dumont cath in place now


Monitor for falls





11/26/20:


Voiding well without cath now


Monitor closely





11/27/20:


DC accuchecks


Dramatic improvement since admit





11/28/20:


Monitor dysphagia


PT OT


Monitor voiding





11/29/20:


Decrease Urecholine


Monitor BP


Fall risk





11/30/20:


Monitor voiding


Fall risk


DC later this week





12/1/20:


Monitor closely


DC planning





(1) CVA (cerebral vascular accident)


(2) Dysarthria


(3) Dysphagia


(4) PEG (percutaneous endoscopic gastrostomy) status


(5) Expressive aphasia


(6) Smoker


(7) COPD (chronic obstructive pulmonary disease)


(8) CAD (coronary artery disease)


(9) Hx of CABG


(10) Dumont catheter in place


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











ANDREY BAH DO                 Dec 1, 2020 08:51

## 2020-12-02 NOTE — NUR
Addended by: RADHA THOMPSON on: 10/3/2019 01:48 PM     Modules accepted: Orders     CM/SS PATIENT CARE CONFERENCE and DISCHARGE PLANNING

Reviewed Summary with daughter/POA Monik Chahal while patient was sleeping.  Both are in 
agreement to the targeted discharge of Friday, December 4, between .



HHC:  [facility] is ready to provide services, RN PT OT .



DME:  [facility] has wheelchair and Right Platform FWW for patient, will be delivered to 
her hospital room Friday prior to discharge.  Monik reports the family has all other 
recommended equipment in place.



Finalize Friday.

## 2020-12-02 NOTE — PHYSICAL THERAPY DAILY NOTE
PT Daily Note-Current


Subjective


Pt. in bathroom with nursing, c/o constipation , nurse present and reports she 

will give miralax and possibly a suppository.  Pt. agrees with this PTA to drink

warmed prune juice and ice cold water back to back in short time span as well as

some walking to attempt to get her bowels moving.





Mental Status


Patient Orientation:  Non-Verbal/Aphasic


Attachments:  Other-See Comments (mask while out of room)





Transfers


SCALE: Activities may be completed with or without assistive devices.





6-Indepedent-patient completes the activity by him/herself with no assistance 

from a helper.


5-Set-up or Clean-up Assistance-helper sets up or cleans up; patient completes 

activity. [place] assists only prior to or  


    following the activity.


4-Supervision or Touching Assistance-helper provides verbal cues and/or 

touching/steadying and/or contact guard assistance as patient completes 

activity. Assistance may be provided   


    throughout the activity or intermittently.


3-Partial/Moderate Assistance-helper does LESS THAN HALF the effort. [place] 

lifts, holds or supports trunk or limbs, but provides less than half the effort.


2-Substantial/Maximal Assistance-helper does MORE THAN HALF the effort. [place] 

lifts or holds trunk or limbs and provides more than half the effort.


6-Bhkswcknq-qddljd does ALL the effort. Patient does none of the effort to 

complete the activity. Or, the assistance of 2 or more helpers is required for 

the patient to complete the  


    activity.


If activity was not attempted, code reason:


7-Patient Refused.


9-Not Applicable-not attempted and the patient did not perform the activity 

before the current illness, exacerbation or injury.


10-Not Attempted due to Environmental Limitations-(lack of equipment, weather 

restraints, etc.).


88-Not Attempted due to Medical Conditions or Safety Concerns.


Sit to Stand (QC):  4


sit to stand from toilet and chairs as well as practicing approaching a chair 

with the FWW





Weight Bearing


Right Lower Extremity:  Right


Full Weight Bearing


Left Lower Extremity:  Left


Full Weight Bearing





Gait Training


Does the Patient Walk?:  Yes


Walk 10 feet (QC):  4


Walk 50 ft with 2 Turns(QC):  4


Gait Persons Needed:  1


Gait Assistive Device:  Walker Platform


gait has improved with FWW and platform, pt. may be able to use QC, will attempt

later perhaps after pt is not so uncomfortable from constipation.  Pt. needs 

cues to equalize step length and bring right foot up in walker in safe fashion 

as it sometimes hits the walker legs





Exercises


Seated Therapy Exercises:  Ankle pumps, Sit to stand, Long arc quads, Hip flexio

n, Hip abd/add


Seated Reps:  15





Assessment


Current Status:  Good Progress


plagued today by discomfort in abdomen as she is constipated, pt. drank approx 4

oz warmed prune juice and 16 oz ice water as well as 4 oz miralax solution 

during Rx





PT Short Term Goals


Short Term Goals


Time Frame:  2020


Roll Left & Right:  4


Sit to lyin (met)


Lying to sitting on side of be:  3


Chair/bed-to-chair transfer:  3 (met)


Walk 10 feet:  3 (met)


Does pt use a wc or scooter:  Yes


Wheel 50ft w/2 turns:  3


Wheel 150 feet:  3





PT Long Term Goals


Long Term Goals


PT Long Term Goals Time Frame:  Dec 3, 2020


Roll Left & Right (QC):  6


Sit to Lying (QC):  6


Lying-Sitting on Side/Bed(QC):  6


Sit to Stand (QC):  4


Chair/Bed-to-Chair Xfer(QC):  4


Toilet Transfer (QC):  4


Car Transfer (QC):  4


Does the Patient Walk:  Yes


Walk 10 feet (QC):  3 (met)


Walk 50ft with 2 Turns (QC):  3 (met)


Walk 150 ft (QC):  88


Walking 10ft on Uneven Surface:  3


1 Step (curb) (QC):  3


4 Steps (QC):  88


12 Steps (QC):  88


Picking up an Object (QC):  88


Does the Pt use WC or Scooter?:  Yes


Wheel 50 feet with 2 turns (QC:  6


Wheel 150 feet:  6





PT Plan


Treatment/Plan


Treatment Plan:  Continue Plan of Care


Treatment Plan:  Bed Mobility, Education, Functional Activity Nona, Functional 

Strength, Group Therapy, Gait, Safety, Therapeutic Exercise, Transfers


Treatment Duration:  2020


Frequency:  At least 5 of 7 days/Wk (IRF)


Estimated Hrs Per Day:  1.5 hours per day


Patient and/or Family Agrees t:  Yes





Safety Risks/Education


Patient Education:  Gait Training, Transfer Techniques, Correct Positioning, 

Disease Process, Safety Issues


Teaching Recipient:  Patient


Teaching Methods:  Demonstration, Discussion


Response to Teaching:  Verbalize Understanding, Return Demonstration, 

Reinforcement Needed





Time/GCodes


Time In:  800


Time Out:  900


Total Billed Treatment Time:  60


Total Billed Treatment


1,GT30m,EX10m,FA20m











ARRON WHATLEY PTA              Dec 2, 2020 08:57

## 2020-12-02 NOTE — OCCUPATIONAL THER DAILY NOTE
OT Current Status-Daily Note


Subjective


Pt. is very upset this date.  Nursing indicates that she is having difficulty 

having BM, and is very constipated.  Nursing gives pt. suppository, and pt. 

attempts to toilet on BSC.





Mental Status/Objective


Patient Orientation:  Person





ADL-Treatment


Therapy Code Descriptions/Definitions 





Functional [place] Measure:


0=Not Assessed/NA        4=Minimal Assistance


1=Total Assistance        5=Supervision or Setup


2=Maximal Assistance  6=Modified [place]


3=Moderate Assistance 7=Complete IndependenceSCALE: Activities may be completed 

with or without assistive devices.





6-Indepedent-patient completes the activity by him/herself with no assistance 

from a helper.


5-Set-up or Clean-up Assistance-helper sets up or cleans up; patient completes 

activity. [place] assists only prior to or  


    following the activity.


4-Supervision or Touching Assistance-helper provides verbal cues and/or 

touching/steadying and/or contact guard assistance as patient completes 

activity. Assistance may be provided   


    throughout the activity or intermittently.


3-Partial/Moderate Assistance-helper does LESS THAN HALF the effort. [place] 

lifts, holds or supports trunk or limbs, but provides less than half the effort.


2-Substantial/Maximal Assistance-helper does MORE THAN HALF the effort. [place] 

lifts or holds trunk or limbs and provides more than half the effort.


0-Ibhllurhr-fevxnj does ALL the effort. Patient does none of the effort to 

complete the activity. Or, the assistance of 2 or more helpers is required for 

the patient to complete the  


    activity.


If activity was not attempted, code reason:


7-Patient Refused.


9-Not Applicable-not attempted and the patient did not perform the activity 

before the current illness, exacerbation or injury.


10-Not Attempted due to Environmental Limitations-(lack of equipment, weather 

restraints, etc.).


88-Not Attempted due to Medical Conditions or Safety Concerns.


Oral Hygiene (QC):  7 (Pt. declines brushing her teeth this date.)


Shower/Bathe Self (QC):  3 (Mod assist for sponge bath while seated on BSC.)


Upper Body Dressing (QC):  2


Lower Body Dressing (QC):  2


On/Off Footwear:  1


Toileting Hygiene (QC):  1


Toilet Transfer (QC):  4 (CGA)





Other Treatment


Pt. is upset when OT enters room this date.  Nursing states that pt. is 

constipated and that they have been giving her stuff to make her go to the 

bathroom.  Pt. agrees to use BSC and to clean up/dress from this surface so that

she can attempt toileting.  Nursing gives pt. suppository and pt. does attempt 

to have BM.  Pt. has very small success, and seems upset.  She is able to 

indicate her discomfort, but not able to state where they pain is, regarding her

stomach or aurelio area.  Pt. requires mod assist to cleanse self and max assist to

doff shirt, and don shirt, brief, and pants.  Dependent assistance needed to don

slipper socks.  Pt. is encouraged to attempt ambulation to help.  Pt. stands at 

walker with platform with min assist for arm support, and ambulates 

approximately 30 feet with slow movement and verbal/tactile cues to keep walker 

in front of her.  Another person has to get wheelchair for her, and pt. is taken

to gym.  Attempt to engage pt. in right sided awareness task and crossing midl

ine.  Max cues given and pt. is able to reach with left hand to her right side, 

but then is unable to follow cue of opening small clothespin with left hand.  

Pt. is distracted and upset that she still needs to go to bathroom.  Declines 

using toilet when getting back to room.  Pt. transfers to reclining chair with 

CGA.  All needs met and chair alarm set.





Education


OT Patient Education:  Correct positioning, Exercise program, Modified ADL 

techniques, Progress toward Goal/Update tx plan, Purpose of tx/functional 

activities, Reviewed precautions, Rehab process, Transfer techniques


Teaching Recipient:  Patient


Teaching Methods:  Demonstration, Discussion


Response to Teaching:  Reinforcement Needed





OT Short Term Goals


Short Term Goals


Time Frame:  2020


Eating:  3


Oral hygiene:  3


Toileting hygiene:  3


Shower/bathe self:  3


Upper body dressin


Lower body dressing:  3


Putting on/taking off footwear:  3





OT Long Term Goals


Long Term Goals


Time Frame:  Dec 10, 2020


Eating (QC):  4


Oral Hygiene (QC):  4


Toileting Hygiene (QC):  3


Shower/Bathe Self (QC):  3


Upper Body Dressing (QC):  5


Lower Body Dressing (QC):  4


On/Off Footwear (QC):  4


Additional Goals:  1-Demonstrate ADL Tasks, 2-Verbalize Understanding, 3-

ImproveStrength/[place]


1=Demonstrate adherence to instructed precautions during ADL tasks.


2=Patient will verbalize/demonstrate understanding of assistive 

devices/modifications for ADL.


3=Patient will improve strength/tolerance for activity to enable patient to 

perform ADL's.





OT Education/Plan


Problem List/Assessment


Assessment:  Decreased Activ Tolerance, Decreased Safety Aware, Decreased UE 

Strength, Dependent Transfers, Impaired Bed Mobility, Impaired Cognition, 

Impaired Coordination, Impaired Funct Balance, Impaired I ADL's, Impaired Self-

Care Skills, Restricted Funct UE ROM, Visual-Perceptual Deficit


Visual perceptual issues difficult to pinpoint due to severe global aphasia.





Discharge Recommendations


Plan/Recommendations:  Continue POC


Therapy Discharge Recommendati:  24 Hour Supervision, Scheduled Assistance, Home

& Family, Post Acute OT





Treatment Plan/Plan of Care


Treatment,Training & Education:  Yes


Patient would benefit from OT for education, treatment and training to promote 

independence in ADL's, mobility, safety and/or upper extremity function for 

ADL's.


Plan of Care:  ADL Retraining, Caregiver Training, Cognitive Retraining, 

Functional Mobility, Group Exercise/Act as Ind, UE Funct Exercise/Act, UE 

Neuromus Re-Ed/Coord, Visual/Perceptual Retrain, W/C Management Training


Treatment Duration:  Dec 10, 2020


Frequency:  At least 5 of 7 days/Wk (IRF)


Estimated Hrs Per Day:  1.5 hours per day


Agreement:  Yes


Rehab Potential:  Fair





Time/GCodes


Start Time:  09:00


Stop Time:  10:15


Total Time Billed (hr/min):  75


Billed Treatment Time


1, ADL x 45minutes, FA x 30minutes











ROSANNA LOVING OT            Dec 2, 2020 10:14

## 2020-12-02 NOTE — PM&R PROGRESS NOTE
Subjective


HPI/CC On Admission


Date Seen by Provider:  Dec 2, 2020


Time Seen by Provider:  08:30


Subjective/Events-last exam


12/2/20:


Very constipated will give all laxatives today and a soap suds enema if needed 


Overall feels pretty good about DC plan for Friday 12/1/20:


Pt dramatically better


Walking very well


Doing very well


Voiding well


BP was a little bit elevated earlier but now its normal








11/30/20:


Labs are okay 


Bowels moved yesterday 


Urinating well, no urinary retention 


Discharge planned later this week 








11/29/20:


Decreasing Urecholine per Dr Tawil now


No pain reported


No falls


No incontinence








11/28/20:


Not voiding very often


Doesn't drink much during day


No pain reported








11/27/20:


Improved status


Feeding herself now


Dramatic improvement since admit


No incontinence now


DC accucheck BID








11/26/20:


Voiding well now


No catheter now


Made her way to the commode on her own without help last night and urinated in 

her clothes on top of the commode


No incontinence usually








11/25/20:


Pt doing pretty well


Dumont is out, will evaluate whether she can void or not on her own


Urocholine is at 50mg AC and HS


Bowels are really moving well








11/24/20:


Pt doing pretty well 


Re-inserted dumont due to retention 


Yeast infection will be managed with Diflucan and Monistat cream








11/23/20:


Pt still doing pretty well 


Participation in therapy varies 


Cognition is an issue 


Overall doing well enough to remain stable 








11/22/20:


Dumont cath still in place


Urecholine 50mg dose now


BM today








11/21/20:


Likely will need catheter replaced


No pain reported


Feeder maintained adequate consumption








11/20/20:


Patient about the same


Requires a feeder to eat


No pain reported


Confusion is a limitation








11/19/20:


Pt has varying participation with PT and OT 


Accu cheks changed to BID 


Will monitor pt closely 


Denies any pain 


Is a feeder








11/18/20:


Re inserted the dumont since urinary retention continued


Increasing oral fluids


Urecholine was increased dose wise


Bowels are moving








11/17/20:


Xanax given last night 


Bowels moved yesterday 


Refusing to eat supper or breakfast today 


Agitated a bit today 


Post-void residual had 238


Hadnt voided since discontinued the cath yesterday 








11/16/20:


Dr. Tawil will discontinue the catheter and evaluate voiding trial today 


Flomax and Urecholine maintained 


Bowels are moving 


Very difficult to recover from catastrophic stroke








11/15/20:


Patient participating with therapy


No pain reported


Dr Tawil consulted for dumont catheter








11/14/20:


Patient denies pain


Working well with structured therapies


Dumont cath still in place will need to DC soon


Working on BM regimen








Patient doing well


Settling in well


No pain reported


Working with PT OT and ST


Eating a bit more without aspiration


No falls


Checked meds and labs


Conferred with RN


Reviewed therapy notes





Review of Systems


General:  Fatigue


Neurological:  Weakness, Incoordination





Objective


Exam


Vital Signs





Vital Signs








  Date Time  Temp Pulse Resp B/P (MAP) Pulse Ox O2 Delivery O2 Flow Rate FiO2


 


12/2/20 20:00      Room Air  


 


12/2/20 19:26     95   


 


12/2/20 17:25 36.8 77 18 159/70 (99)    





Capillary Refill : Less Than 3 Seconds


General Appearance:  No Apparent Distress, Anxious, Chronically ill, Thin


HEENT:  PERRL/EOMI, Normal ENT Inspection, Pharynx Normal


Neck:  Full Range of Motion, Normal Inspection, Non Tender, Supple, Carotid 

Bruit


Respiratory:  Chest Non Tender, Lungs Clear, No Accessory Muscle Use, No 

Respiratory Distress, Decreased Breath Sounds


Cardiovascular:  Regular Rate, Rhythm, No Edema, No Gallop, No JVD, No Murmur, 

Normal Peripheral Pulses


Gastrointestinal:  Normal Bowel Sounds, No Organomegaly, No Pulsatile Mass, Non 

Tender, Soft


Back:  Normal Inspection, No CVA Tenderness, No Vertebral Tenderness


Extremity:  Normal Capillary Refill, Normal Inspection, Normal Range of Motion 

(except right sided weakness), Non Tender, No Calf Tenderness, No Pedal Edema


Neurologic/Psychiatric:  Alert, No Motor/Sensory Deficits, Normal Mood/Affect, 

Abnormal CNs II-XII, Aphasia, Facial Droop, Motor Weakness (right sided 

flaccidity)


Skin:  Normal Color, Warm/Dry


Lymphatic:  No Adenopathy





Results/Procedures


Lab


Patient resulted labs reviewed.





FIM


Transfers


Therapy Code Descriptions/Definitions 





Functional [place] Measure:


0=Not Assessed/NA        4=Minimal Assistance


1=Total Assistance        5=Supervision or Setup


2=Maximal Assistance  6=Modified [place]


3=Moderate Assistance 7=Complete IndependenceSCALE: Activities may be completed 

with or without assistive devices.





6-Indepedent-patient completes the activity by him/herself with no assistance 

from a helper.


5-Set-up or Clean-up Assistance-helper sets up or cleans up; patient completes 

activity. [place] assists only prior to or  


    following the activity.


4-Supervision or Touching Assistance-helper provides verbal cues and/or 

touching/steadying and/or contact guard assistance as patient completes 

activity. Assistance may be provided   


    throughout the activity or intermittently.


3-Partial/Moderate Assistance-helper does LESS THAN HALF the effort. [place] 

lifts, holds or supports trunk or limbs, but provides less than half the effort.


2-Substantial/Maximal Assistance-helper does MORE THAN HALF the effort. [place] 

lifts or holds trunk or limbs and provides more than half the effort.


5-Znqsaggnl-qrunvi does ALL the effort. Patient does none of the effort to 

complete the activity. Or, the assistance of 2 or more helpers is required for 

the patient to complete the  


    activity.


If activity was not attempted, code reason:


7-Patient Refused.


9-Not Applicable-not attempted and the patient did not perform the activity 

before the current illness, exacerbation or injury.


10-Not Attempted due to Environmental Limitations-(lack of equipment, weather 

restraints, etc.).


88-Not Attempted due to Medical Conditions or Safety Concerns.


Roll Left to Right (QC):  6


Sit to Lying (QC):  6


Sit to Stand (QC):  4 (CGA with cues for sequencing.  Pt requires reminders to 

push up from the chair. )


Chair/Bed-to-Chair Xfer(QC):  3 (min assist to manage the walker, safety with 

backing up and cues for safety.  )


Car Transfer (QC):  3 (min assist to manage the walker with heavy cues for 

sequencing and safety.  Pt able to lift right leg in car but with cues to 

initiate. )





Gait Training


Does the Patient Walk?:  Yes


Distance:  40ft x3


Walk 10 feet (QC):  4


Walk 50 ft with 2 Turns(QC):  4


Walk 150 ft (QC):  4 (CGA with intermitted cues for sequencing and safety.  

intermittent assist to manage the walker. )


Walking 10ft/uneven surface-QC:  88


Gait Persons Needed:  1


Gait Assistive Device:  Walker Platform





Wheelchair Training


Does the Pt Use a Wheelchair?:  Yes


Distance:  150'x3


Wheel 50 ft with 2 turns (QC):  3


Wheel 150 ft (QC):  5


Type of Wheelchair:  Manual





Stair Training


 Stair Training: Handrails/:  uses walker


1 Step (curb) (QC):  3 (min assist for balance; 100% cues for sequencing and 

safety.  performed x 2 reps.  used the short pink step in gym)


4 Steps (QC):  88


12 Steps (QC):  88





Balance


Picking up an Object (QC):  88





ADL-Treatment


Eating (QC):  4


Oral Hygiene (QC):  5


Bathing Location:  L Arm, R Arm, L Upper Leg, R Upper Leg, L Lower Leg 

(including foot), R Lower Leg (including foot), Chest, Abdomen, Buttocks, 

Perineal Area


Shower/Bathe Self (QC):  3 (Mod assist overall to shower and wash all parts.  

Max cues.)


Upper Body Dressing (QC):  2


Lower Body Dressing (QC):  2


On/Off Footwear (QC):  2


Toileting Hygiene (QC):  7


Toilet Transfer (QC):  7 (Pt. indicates at begining and end of treatment that 

she does not need to use bathroom.)





Assessment/Plan


Assessment and Plan


Assess & Plan/Chief Complaint


Assessment per Luke Bainbridge, MSIII:


Critical illness myopathy


S/P CVA involving left occipital artery


S/P 3 vessel CABG


Debility


Expressive aphasia


Dysarthria


Right sided weakness


Diabetes mellitus


COPD


CAD


Dysphagia requiring PEG now eating well


Dumont cath in place now DC and voiding well on Urecholine





Plan:


1. Ensure adequate anticoagulation


2. PT, OT, and SLP eval's and treat


3. Oxygen via NC- wean as tolerated


4. Resume Home medications


5. Fall precautions


6. Dietary consult


7. DVT prophylaxis





11/13/20:


Patient participating well


No pain reported


Continue current meds


Advance diet





11/14/20:


Dumont DC soon


Continue home meds


Therapies to continue protocol





11/15/20:


Dr Tawil appreciated


Monitor BP


Monitor labs





11/16/20:


Bladder meds


Appreciate Dr Tawil


PT OT ST





11/17/20:


Voiding trial


Monitor closely


Variable moods





11/18/20:


Monitor BP


Encourage PO intake


Dumont cath management





11/19/20:


Encourage participation with therapies


Monitor dumont cath





11/20/20:


Monitor closely


Bladder management





11/21/20:


Monitor closely


Cath maintained?





11/22/20:


Cath in place


Check labs in am


Increased dose of Urecholine





11/23/20:


Monitor bladder function


Increased dose of Urecholine





11/24/20:


Maintain dumont catheter


IRF protocol


Family evaluating DC plans





11/25/20:


DC next week


Dumont cath in place now


Monitor for falls





11/26/20:


Voiding well without cath now


Monitor closely





11/27/20:


DC accuchecks


Dramatic improvement since admit





11/28/20:


Monitor dysphagia


PT OT


Monitor voiding





11/29/20:


Decrease Urecholine


Monitor BP


Fall risk





11/30/20:


Monitor voiding


Fall risk


DC later this week





12/1/20:


Monitor closely


DC planning





12/2/20:


Monitor constipation


DC planned Friday





(1) CVA (cerebral vascular accident)


(2) Dysarthria


(3) Dysphagia


(4) PEG (percutaneous endoscopic gastrostomy) status


(5) Expressive aphasia


(6) Smoker


(7) COPD (chronic obstructive pulmonary disease)


(8) CAD (coronary artery disease)


(9) Hx of CABG


(10) Dumont catheter in place


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











ANDREY BAH DO                 Dec 2, 2020 05:56

## 2020-12-02 NOTE — PROGRESS NOTE - CARDIOLOGY
Cardiology SOAP Progress Note


Subjective:


Sitting up in recliner at the bedside.  States she feels "good".





Objective:


I&O/Vital Signs











 12/3/20 12/3/20





 06:00 09:00


 


Temp 36.2 


 


Pulse 77 


 


Resp 18 


 


B/P (MAP) 139/65 (89) 


 


Pulse Ox 96 


 


O2 Delivery Room Air Room Air














 12/3/20





 00:00


 


Intake Total 1200 ml


 


Balance 1200 ml








Constitutional:  AAO x 3, well-developed, well-nourished, other (mild expressive

aphasia)


Respiratory:  No accessory muscle use, No respiratory distress; chest expansion 

is symmetric, chest is bilaterally symmetric, lungs clear to auscultation


Cardiovascular:  regular rate-rhythm; No JVD; S1 and S2


Gastrointestional:  No tender; soft, round, audible bowel sounds


Extremities:  no lower extremity edema bilateral


Neurologic/Psychiatric:  other (RUE and RLE weakness)





A/P:


Assessment:





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

aphasia -  Plavix and ASA.





Coronary artery disease status post CABG 3





Hypertension





Hyperlipidemia





Questionable peripheral arterial disease with ischemic event after her bypass 

has improved after conservative management, continue on Plavix





Diabetes mellitus, followed and managed by primary care physician





Debility, receiving physical therapy


Plan:


Continue current cardiac regimen


Monitor lab











RASHAUN MONTOYA            Dec 2, 2020 12:20

## 2020-12-02 NOTE — PROGRESS NOTE - CARDIOLOGY
Cardiology SOAP Progress Note


Subjective:


No cp or palp or syncope


No shortness of breath at rest 


Gen malaise


No n/v/d





Objective:


I&O/Vital Signs











 12/2/20 12/2/20





 05:17 09:00


 


Temp 36.1 


 


Pulse 82 


 


Resp 18 


 


B/P (MAP) 149/67 (94) 


 


Pulse Ox 96 


 


O2 Delivery Room Air Room Air














 12/2/20





 00:00


 


Intake Total 720 ml


 


Balance 720 ml








Constitutional:  AAO x 3, well-developed, well-nourished, other (mild expressive

aphasia)


Respiratory:  No accessory muscle use, No respiratory distress; chest expansion 

is symmetric, chest is bilaterally symmetric, lungs clear to auscultation


Cardiovascular:  regular rate-rhythm; No JVD; S1 and S2


Gastrointestional:  No tender; soft, round, audible bowel sounds


Extremities:  no lower extremity edema bilateral


Neurologic/Psychiatric:  other (RUE and RLE weakness)





A/P:


Assessment:





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

aphasia -  Plavix and ASA.





Coronary artery disease status post CABG 3





Hypertension





Hyperlipidemia





Questionable peripheral arterial disease with ischemic event after her bypass 

has improved after conservative management, continue on Plavix





Diabetes mellitus, followed and managed by primary care physician





Debility, receiving physical therapy


Plan:


Continue current cardiac regimen


Monitor lab











MENDOZA GIFFORD MD FACP FACC CCDS    Dec 2, 2020 15:13

## 2020-12-02 NOTE — SPEECH THERAPY DAILY NOTE
Speech Daily Progress Note


Subjective


Date Seen by Provider:  Dec 2, 2020


Time Seen by Provider:  04:50


Patient was resting in her bed following lunch. She was able to participate with

therapy.





Objective


Patient completed simple confrontational naming task presented with pictures of 

common everyday objects at 50% with 25% verbal and/or visual cues.





Assessment


Assessment Current Status:  Fair Progress





Treatment Plan


Continue Plan of Care





Speech Short Term Goals


Short Term Goals


Short Term Goals


1) The patient will complete cognitive tasks related to memory, safety awareness

and problem solving at 80% with minimal cues.


2) The patient will complete speech tasks  to improve intelligibility at 80% 

with minimal cues.


3) The patient will complete confrontational naming tasks at 90% with minimal 

cues.


4) The patient will complete OME for improved oral status for safe oral intake 

at 80% or greater with minimal cues.





Speech Long Term Goals


Long Term Goals


Patient will improve communication abilities and safe oral intake in order to 

return to prior level.





Speech-Plan


Patient/Family Goals


Patient/Family Goals:  


Patient is scheduled to discharge to live with her daughter on 12/4/2020.





Treatment Plan


Speech Therapy Treatment Plan:  Continue Plan of Care


Treatment Duration:  Nov 25, 2020


Frequency:  4 times per week (Patient will receive ST 4-5x per week)


Estimated Hrs Per Day:  .5 hour per day


Rehab Potential:  Fair


Barriers to Learning:  


Patient's expressive aphasia, age


Pt/Family Agrees to Plan:  Yes





Safety Risks/Education


Teaching Recipient:  Patient


Teaching Methods:  Demonstration, Discussion


Response to Teaching:  Verbalize Understanding, Return Demonstration


Education Topics Provided:  


Safety within her room and utilization of the call light as needed


Safety with oral intake upon her return home





Time


Speech Therapy Time In:  13:45


Speech Therapy Time Out:  14:30


Total Billed Time:  45


Billed Treatment Time


1, SLTS, DYST


FRANCHESKA Lau             Dec 2, 2020 14:25

## 2020-12-03 NOTE — PM&R PROGRESS NOTE
Subjective


HPI/CC On Admission


Date Seen by Provider:  Dec 3, 2020


Time Seen by Provider:  05:10


Subjective/Events-last exam


12/3/20:


Patient sleeping at current time of seen


Discharge is planned for tomorrow


We will establish her care with me





The patient has a mobility limitation that significantly impairs his/her ability

to do one or more mobility-related activities of daily living in the home. 


The patient's mobility imitation can not be helped solely with a cane or walker.




The patient has enough room inside their residence for a wheelchair. 


The patient can safely use a wheelchair on their own or with the help of a 

caregiver.








12/2/20:


Very constipated will give all laxatives today and a soap suds enema if needed 


Overall feels pretty good about DC plan for Friday 12/1/20:


Pt dramatically better


Walking very well


Doing very well


Voiding well


BP was a little bit elevated earlier but now its normal








11/30/20:


Labs are okay 


Bowels moved yesterday 


Urinating well, no urinary retention 


Discharge planned later this week 








11/29/20:


Decreasing Urecholine per Dr Tawil now


No pain reported


No falls


No incontinence








11/28/20:


Not voiding very often


Doesn't drink much during day


No pain reported








11/27/20:


Improved status


Feeding herself now


Dramatic improvement since admit


No incontinence now


DC accucheck BID








11/26/20:


Voiding well now


No catheter now


Made her way to the commode on her own without help last night and urinated in 

her clothes on top of the commode


No incontinence usually








11/25/20:


Pt doing pretty well


Dumont is out, will evaluate whether she can void or not on her own


Urocholine is at 50mg AC and HS


Bowels are really moving well








11/24/20:


Pt doing pretty well 


Re-inserted dumont due to retention 


Yeast infection will be managed with Diflucan and Monistat cream








11/23/20:


Pt still doing pretty well 


Participation in therapy varies 


Cognition is an issue 


Overall doing well enough to remain stable 








11/22/20:


Dumont cath still in place


Urecholine 50mg dose now


BM today








11/21/20:


Likely will need catheter replaced


No pain reported


Feeder maintained adequate consumption








11/20/20:


Patient about the same


Requires a feeder to eat


No pain reported


Confusion is a limitation








11/19/20:


Pt has varying participation with PT and OT 


Accu cheks changed to BID 


Will monitor pt closely 


Denies any pain 


Is a feeder








11/18/20:


Re inserted the dumont since urinary retention continued


Increasing oral fluids


Urecholine was increased dose wise


Bowels are moving








11/17/20:


Xanax given last night 


Bowels moved yesterday 


Refusing to eat supper or breakfast today 


Agitated a bit today 


Post-void residual had 238


Hadnt voided since discontinued the cath yesterday 








11/16/20:


Dr. Tawil will discontinue the catheter and evaluate voiding trial today 


Flomax and Urecholine maintained 


Bowels are moving 


Very difficult to recover from catastrophic stroke








11/15/20:


Patient participating with therapy


No pain reported


Dr Tawil consulted for dumont catheter








11/14/20:


Patient denies pain


Working well with structured therapies


Dumont cath still in place will need to DC soon


Working on BM regimen








Patient doing well


Settling in well


No pain reported


Working with PT OT and ST


Eating a bit more without aspiration


No falls


Checked meds and labs


Conferred with RN


Reviewed therapy notes





Review of Systems


General:  Fatigue


Neurological:  Weakness, Incoordination, Change in speech





Objective


Exam


Vital Signs





Vital Signs








  Date Time  Temp Pulse Resp B/P (MAP) Pulse Ox O2 Delivery O2 Flow Rate FiO2


 


12/3/20 09:00      Room Air  


 


12/3/20 06:00 36.2 77 18 139/65 (89) 96   





Capillary Refill : Less Than 3 Seconds


General Appearance:  No Apparent Distress, Anxious, Chronically ill, Thin


HEENT:  PERRL/EOMI, Normal ENT Inspection, Pharynx Normal


Neck:  Full Range of Motion, Normal Inspection, Non Tender, Supple, Carotid 

Bruit


Respiratory:  Chest Non Tender, Lungs Clear, No Accessory Muscle Use, No 

Respiratory Distress, Decreased Breath Sounds


Cardiovascular:  Regular Rate, Rhythm, No Edema, No Gallop, No JVD, No Murmur, 

Normal Peripheral Pulses


Gastrointestinal:  Normal Bowel Sounds, No Organomegaly, No Pulsatile Mass, Non 

Tender, Soft


Back:  Normal Inspection, No CVA Tenderness, No Vertebral Tenderness


Extremity:  Normal Capillary Refill, Normal Inspection, Normal Range of Motion 

(except right sided weakness), Non Tender, No Calf Tenderness, No Pedal Edema


Neurologic/Psychiatric:  Alert, No Motor/Sensory Deficits, Normal Mood/Affect, 

Abnormal CNs II-XII, Aphasia, Facial Droop, Motor Weakness (right sided 

flaccidity)


Skin:  Normal Color, Warm/Dry


Lymphatic:  No Adenopathy





Results/Procedures


Lab


Patient resulted labs reviewed.





FIM


Transfers


Therapy Code Descriptions/Definitions 





Functional [place] Measure:


0=Not Assessed/NA        4=Minimal Assistance


1=Total Assistance        5=Supervision or Setup


2=Maximal Assistance  6=Modified [place]


3=Moderate Assistance 7=Complete IndependenceSCALE: Activities may be completed 

with or without assistive devices.





6-Indepedent-patient completes the activity by him/herself with no assistance 

from a helper.


5-Set-up or Clean-up Assistance-helper sets up or cleans up; patient completes 

activity. [place] assists only prior to or  


    following the activity.


4-Supervision or Touching Assistance-helper provides verbal cues and/or 

touching/steadying and/or contact guard assistance as patient completes 

activity. Assistance may be provided   


    throughout the activity or intermittently.


3-Partial/Moderate Assistance-helper does LESS THAN HALF the effort. [place] 

lifts, holds or supports trunk or limbs, but provides less than half the effort.


2-Substantial/Maximal Assistance-helper does MORE THAN HALF the effort. [place] 

lifts or holds trunk or limbs and provides more than half the effort.


7-Neqjxxree-rumluy does ALL the effort. Patient does none of the effort to 

complete the activity. Or, the assistance of 2 or more helpers is required for 

the patient to complete the  


    activity.


If activity was not attempted, code reason:


7-Patient Refused.


9-Not Applicable-not attempted and the patient did not perform the activity 

before the current illness, exacerbation or injury.


10-Not Attempted due to Environmental Limitations-(lack of equipment, weather 

restraints, etc.).


88-Not Attempted due to Medical Conditions or Safety Concerns.


Roll Left to Right (QC):  6


Sit to Lying (QC):  6


Sit to Stand (QC):  5


Chair/Bed-to-Chair Xfer(QC):  5


Car Transfer (QC):  3 (min assist to manage the walker with heavy cues for 

sequencing and safety.  Pt able to lift right leg in car but with cues to 

initiate. )





Gait Training


Does the Patient Walk?:  Yes


Distance:  150' x2


Walk 10 feet (QC):  5


Walk 50 ft with 2 Turns(QC):  5


Walk 150 ft (QC):  5


Walking 10ft/uneven surface-QC:  88


Gait Persons Needed:  1


Gait Assistive Device:  Walker Platform





Wheelchair Training


Does the Pt Use a Wheelchair?:  Yes


Distance:  150'x3


Wheel 50 ft with 2 turns (QC):  3


Wheel 150 ft (QC):  5


Type of Wheelchair:  Manual





Stair Training


 Stair Training: Handrails/:  uses walker


1 Step (curb) (QC):  3 (min assist for balance; 100% cues for sequencing and 

safety.  performed x 2 reps.  used the short pink step in gym)


4 Steps (QC):  88


12 Steps (QC):  88





Balance


Picking up an Object (QC):  88





ADL-Treatment


Eating (QC):  4


Oral Hygiene (QC):  7 (Pt. declines brushing her teeth this date.)


Bathing Location:  L Arm, R Arm, L Upper Leg, R Upper Leg, L Lower Leg 

(including foot), R Lower Leg (including foot), Chest, Abdomen, Buttocks, 

Perineal Area


Shower/Bathe Self (QC):  3 (Mod assist for sponge bath while seated on BSC.)


Upper Body Dressing (QC):  2


Lower Body Dressing (QC):  2


On/Off Footwear (QC):  1


Toileting Hygiene (QC):  1


Toilet Transfer (QC):  4 (CGA)





Assessment/Plan


Assessment and Plan


Assess & Plan/Chief Complaint


Assessment per Luke Bainbridge, MSIII:


Critical illness myopathy


S/P CVA involving left occipital artery


S/P 3 vessel CABG


Debility


Expressive aphasia


Dysarthria


Right sided weakness


Diabetes mellitus


COPD


CAD


Dysphagia requiring PEG now eating well


Dumont cath in place now DC and voiding well on Urecholine





Plan:


1. Ensure adequate anticoagulation


2. PT, OT, and SLP eval's and treat


3. Oxygen via NC- wean as tolerated


4. Resume Home medications


5. Fall precautions


6. Dietary consult


7. DVT prophylaxis





11/13/20:


Patient participating well


No pain reported


Continue current meds


Advance diet





11/14/20:


Dumont DC soon


Continue home meds


Therapies to continue protocol





11/15/20:


Dr Tawil appreciated


Monitor BP


Monitor labs





11/16/20:


Bladder meds


Appreciate Dr Tawil


PT OT ST





11/17/20:


Voiding trial


Monitor closely


Variable moods





11/18/20:


Monitor BP


Encourage PO intake


Dumont cath management





11/19/20:


Encourage participation with therapies


Monitor dumont cath





11/20/20:


Monitor closely


Bladder management





11/21/20:


Monitor closely


Cath maintained?





11/22/20:


Cath in place


Check labs in am


Increased dose of Urecholine





11/23/20:


Monitor bladder function


Increased dose of Urecholine





11/24/20:


Maintain dumont catheter


IRF protocol


Family evaluating DC plans





11/25/20:


DC next week


Dumont cath in place now


Monitor for falls





11/26/20:


Voiding well without cath now


Monitor closely





11/27/20:


DC accuchecks


Dramatic improvement since admit





11/28/20:


Monitor dysphagia


PT OT


Monitor voiding





11/29/20:


Decrease Urecholine


Monitor BP


Fall risk





11/30/20:


Monitor voiding


Fall risk


DC later this week





12/1/20:


Monitor closely


DC planning





12/2/20:


Monitor constipation


DC planned Friday





12/3/20:


DC planned


Patient sleeping at current time of seen


Discharge is planned for tomorrow


We will establish her care with me





The patient has a mobility limitation that significantly impairs his/her ability

to do one or more mobility-related activities of daily living in the home. 


The patient's mobility imitation can not be helped solely with a cane or walker.




The patient has enough room inside their residence for a wheelchair. 


The patient can safely use a wheelchair on their own or with the help of a 

caregiver.





(1) CVA (cerebral vascular accident)


(2) Dysarthria


(3) Dysphagia


(4) PEG (percutaneous endoscopic gastrostomy) status


(5) Expressive aphasia


(6) Smoker


(7) COPD (chronic obstructive pulmonary disease)


(8) CAD (coronary artery disease)


(9) Hx of CABG


(10) Dumont catheter in place


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











ANDREY BAH DO                 Dec 3, 2020 12:51

## 2020-12-03 NOTE — SPEECH THERAPY DAILY NOTE
Speech Daily Progress Note


Subjective


Date Seen by Provider:  Dec 3, 2020


Time Seen by Provider:  00:30


Patient was resting in her recliner following her other therapies. She is very 

excited to be returning to her daughter's home tomorrow.





Objective


Patient completed a series of naming common household items with 50% given 25% 

verbal cues.


Patient continues to utilize compensatory strategies as trained.





Assessment


Assessment Current Status:  Good Progress





Treatment Plan


Discontinue ST, Goals Met





Speech Short Term Goals


Short Term Goals


Short Term Goals


1) The patient will complete cognitive tasks related to memory, safety awareness

and problem solving at 80% with minimal cues.


2) The patient will complete speech tasks  to improve intelligibility at 80% 

with minimal cues.


3) The patient will complete confrontational naming tasks at 90% with minimal 

cues.


4) The patient will complete OME for improved oral status for safe oral intake 

at 80% or greater with minimal cues.





Speech Long Term Goals


Long Term Goals


Patient will improve communication abilities and safe oral intake in order to 

return to prior level.





Speech-Plan


Patient/Family Goals


Patient/Family Goals:  


Patient is scheduled to return home to live with her daughter tomorrow.





Treatment Plan


Speech Therapy Treatment Plan:  Discontinue ST, Goals Met


Treatment Duration:  Nov 25, 2020


Frequency:  4 times per week (Patient will receive ST 4-5x per week)


Estimated Hrs Per Day:  .5 hour per day


Rehab Potential:  Fair


Barriers to Learning:  


Patient's expressive aphasia, age


Pt/Family Agrees to Plan:  Yes





Safety Risks/Education


Teaching Recipient:  Patient


Teaching Methods:  Demonstration, Discussion


Response to Teaching:  Verbalize Understanding, Return Demonstration


Education Topics Provided:  


Continued safety with oral intake upon her return home, diet level





Time


Speech Therapy Time In:  10:30


Speech Therapy Time Out:  11:00


Total Billed Time:  30


Billed Treatment Time


1, DYST, SLTS


No


QUALITY CODES


EXPRESSION OF IDEAS/WANTS:4


UNDERSTANDING VERBAL CONTENT: 4


BRIEF INTERVIEW MENTAL STATUS: YES


REPETITION OF 3 WORDS: 3


TEMPORAL ORIENTATION: YEAR: CORRECT, MONTH: CORRECT, DAY: CORRECT


RECALL SOCK: YES WITH CUE, COLOR: YES WITH CUE, BED: YES


MEMORY/RECALL ABILITY: SEASON, LOCATION OF HER ROOM, THAT SHE IS IN THE HOSPITAL











FRANCHESKA HOGAN              Dec 3, 2020 11:30

## 2020-12-03 NOTE — PROGRESS NOTE - CARDIOLOGY
Cardiology SOAP Progress Note


Subjective:


Gen malaise and weakness


No shortness of breath


No cp or palp or syncope


No n/v/d





Objective:


I&O/Vital Signs











 12/3/20 12/3/20





 09:00 17:11


 


Temp  36.4


 


Pulse  77


 


Resp  18


 


B/P (MAP)  145/66 (92)


 


Pulse Ox  95


 


O2 Delivery Room Air Room Air














 12/3/20





 00:00


 


Intake Total 1200 ml


 


Balance 1200 ml








Constitutional:  AAO x 3, well-developed, well-nourished, other (mild expressive

aphasia)


Respiratory:  No accessory muscle use, No respiratory distress; chest expansion 

is symmetric, chest is bilaterally symmetric, lungs clear to auscultation


Cardiovascular:  regular rate-rhythm; No JVD; S1 and S2


Gastrointestional:  No tender; soft, round, audible bowel sounds


Extremities:  no lower extremity edema bilateral


Neurologic/Psychiatric:  other (RUE and RLE weakness)





A/P:


Assessment:





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

aphasia -  Plavix and ASA.





Coronary artery disease status post CABG 3





Hypertension





Hyperlipidemia





Questionable peripheral arterial disease with ischemic event after her bypass 

has improved after conservative management, continue on Plavix





Diabetes mellitus, followed and managed by primary care physician





Debility, receiving physical therapy


Plan:


Continue current cardiac regimen


Monitor lab











MENDOZA GIFFORD MD FACP FACC CCDS    Dec 3, 2020 19:33

## 2020-12-03 NOTE — PROGRESS NOTE - CARDIOLOGY
Cardiology SOAP Progress Note


Subjective:


Sitting up in recliner at the bedside


States she feels "good"


No c/o CP, palpitations or dyspnea





Objective:


I&O/Vital Signs











 12/3/20 12/4/20





 20:40 06:32


 


Temp  36.5


 


Pulse  68


 


Resp  20


 


B/P (MAP)  156/70 (98)


 


Pulse Ox  93


 


O2 Delivery Room Air Room Air














 12/4/20





 00:00


 


Intake Total 650 ml


 


Balance 650 ml








Constitutional:  AAO x 3, well-developed, well-nourished, other (mild expressive

aphasia)


Respiratory:  No accessory muscle use, No respiratory distress; chest expansion 

is symmetric, chest is bilaterally symmetric, lungs clear to auscultation


Cardiovascular:  regular rate-rhythm; No JVD; S1 and S2


Gastrointestional:  No tender; soft, round, audible bowel sounds


Extremities:  no lower extremity edema bilateral


Neurologic/Psychiatric:  other (RUE and RLE weakness)





A/P:


Assessment:





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

aphasia -  Plavix and ASA.





Coronary artery disease status post CABG 3





Hypertension





Hyperlipidemia





Questionable peripheral arterial disease with ischemic event after her bypass 

has improved after conservative management, continue on Plavix





Diabetes mellitus, followed and managed by primary care physician





Debility, receiving physical therapy


Plan:


Continue current cardiac regimen


Monitor lab











RASHAUN MONTOYA            Dec 3, 2020 11:33

## 2020-12-03 NOTE — OCCUPATIONAL THER DAILY NOTE
OT Current Status-Daily Note


Subjective


No pain reported.





Mental Status/Objective


Patient Orientation:  Person





ADL-Treatment


Therapy Code Descriptions/Definitions 





Functional [place] Measure:


0=Not Assessed/NA        4=Minimal Assistance


1=Total Assistance        5=Supervision or Setup


2=Maximal Assistance  6=Modified [place]


3=Moderate Assistance 7=Complete IndependenceSCALE: Activities may be completed 

with or without assistive devices.





6-Indepedent-patient completes the activity by him/herself with no assistance 

from a helper.


5-Set-up or Clean-up Assistance-helper sets up or cleans up; patient completes 

activity. [place] assists only prior to or  


    following the activity.


4-Supervision or Touching Assistance-helper provides verbal cues and/or 

touching/steadying and/or contact guard assistance as patient completes 

activity. Assistance may be provided   


    throughout the activity or intermittently.


3-Partial/Moderate Assistance-helper does LESS THAN HALF the effort. [place] 

lifts, holds or supports trunk or limbs, but provides less than half the effort.


2-Substantial/Maximal Assistance-helper does MORE THAN HALF the effort. [place] 

lifts or holds trunk or limbs and provides more than half the effort.


6-Yhpfszsvm-ypgcix does ALL the effort. Patient does none of the effort to 

complete the activity. Or, the assistance of 2 or more helpers is required for 

the patient to complete the  


    activity.


If activity was not attempted, code reason:


7-Patient Refused.


9-Not Applicable-not attempted and the patient did not perform the activity 

before the current illness, exacerbation or injury.


10-Not Attempted due to Environmental Limitations-(lack of equipment, weather 

restraints, etc.).


88-Not Attempted due to Medical Conditions or Safety Concerns.


Eating (QC):  4


Oral Hygiene (QC):  4 (SBA and cues)


Shower/Bathe Self (QC):  3 (Mod assist overall to shower and max cues.)


Upper Body Dressing (QC):  2 (Max assist to doff/don shirt.)


Lower Body Dressing (QC):  2


On/Off Footwear:  2


Toileting Hygiene (QC):  2


Toilet Transfer (QC):  4





Other Treatment


Pt. agrees to shower.  After ADL tasks, pt. transferred to wheelchair and 

attempted to self propel wheelchair to therapy gym.  Required mod assist for 

this.  Engaged pt. in series of tasks to increase ability to follow cues and 

sequence.  Pt. engaged in card game, in which her and therapist would turn over 

a card, and pt. encouraged to state who's card was bigger.  Pt. was able to do 

this, but was only approximately 50% correct with being able to state what 

number was on the card.  Pt. was engaged in activity to look to right side and 

retrieve items from right side.  Max cues needed, but once pt. did the task 

once, she was able to do it easier.  Pt. taken back to room and transferred to 

chair with CGA.  All needs met and chair alarm set.





Education


OT Patient Education:  Correct positioning, Exercise program, Modified ADL 

techniques, Progress toward Goal/Update tx plan, Purpose of tx/functional 

activities, Reviewed precautions, Rehab process, Transfer techniques


Teaching Recipient:  Patient


Teaching Methods:  Demonstration, Discussion


Response to Teaching:  Reinforcement Needed





OT Short Term Goals


Short Term Goals


Time Frame:  2020


Eating:  3


Oral hygiene:  3


Toileting hygiene:  3


Shower/bathe self:  3


Upper body dressin


Lower body dressing:  3


Putting on/taking off footwear:  3





OT Long Term Goals


Long Term Goals


Time Frame:  Dec 10, 2020


Eating (QC):  4


Oral Hygiene (QC):  4


Toileting Hygiene (QC):  3


Shower/Bathe Self (QC):  3


Upper Body Dressing (QC):  5


Lower Body Dressing (QC):  4


On/Off Footwear (QC):  4


Additional Goals:  1-Demonstrate ADL Tasks, 2-Verbalize Understanding, 3-

ImproveStrength/[place]


1=Demonstrate adherence to instructed precautions during ADL tasks.


2=Patient will verbalize/demonstrate understanding of assistive 

devices/modifications for ADL.


3=Patient will improve strength/tolerance for activity to enable patient to 

perform ADL's.





OT Education/Plan


Problem List/Assessment


Assessment:  Decreased Activ Tolerance, Decreased Safety Aware, Decreased UE 

Strength, Dependent Transfers, Impaired Bed Mobility, Impaired Cognition, 

Impaired Coordination, Impaired Funct Balance, Impaired I ADL's, Impaired Self-

Care Skills, Restricted Funct UE ROM, Visual-Perceptual Deficit


Visual perceptual issues difficult to pinpoint due to severe global aphasia.





Discharge Recommendations


Plan/Recommendations:  Continue POC


Therapy Discharge Recommendati:  24 Hour Supervision, Scheduled Assistance, Home

& Family, Post Acute OT





Treatment Plan/Plan of Care


Treatment,Training & Education:  Yes


Patient would benefit from OT for education, treatment and training to promote 

independence in ADL's, mobility, safety and/or upper extremity function for 

ADL's.


Plan of Care:  ADL Retraining, Caregiver Training, Cognitive Retraining, 

Functional Mobility, Group Exercise/Act as Ind, UE Funct Exercise/Act, UE 

Neuromus Re-Ed/Coord, Visual/Perceptual Retrain, W/C Management Training


Treatment Duration:  Dec 10, 2020


Frequency:  At least 5 of 7 days/Wk (IRF)


Estimated Hrs Per Day:  1.5 hours per day


Agreement:  Yes


Rehab Potential:  Fair





Time/GCodes


Start Time:  09:00


Stop Time:  10:15


Total Time Billed (hr/min):  75


Billed Treatment Time


1, ADL x 45minutes, FA x 30minutes











ROSANNA LOVING OT            Dec 3, 2020 15:48

## 2020-12-03 NOTE — D/C HH FACE TO FACE ORDER
D/C  Face to Face Orders


Reconcile Patient Problems


Problems Reviewed?:  Yes





Instructions for Patient


[facility], [phone]


Patient Instructions/FollowUp:  


Dr Petit in 2 weeks


Physician to follow Patient:  Damaso


Discharge Diet for Home:  No Restrictions


Patient Problems:  


CVA





Patient Data-Allergies,Ht & Wt


Patient Allergies:  


Coded Allergies:  


     No Known Drug Allergies (Unverified , 11/12/20)





Home Health Need/Face to Face


Date of Face to Face:  Dec 3, 2020


Clinical Findings:  Generalized weakness and fatigue, Instability, Muscle 

weakness, Unsteady gait


I have seen Pt face-to-face:  Yes


Discharged To:  Home


Diagnosis/Conditions:  


CVA


Patient is Homebound due to:  CognItive deficits, [place] fall risk due to 

instabilty, Muscle weakness


Homebound Status


   Due to the above stated illness, injury or surgical procedure (medical 

condition or diagnosis) and associated clinical findings, the patient is 

homebound because of his/her inability to leave home except with aid of a 

supportive device and/or person AND leaving the home requires a considerable and

taxing effort or is medically contraindicated.


Pt req the following assistanc:  Walker, Wheelchair





Home Health Nursing Orders


Home Health Services Order:  Nursing Services, Occupational Ther-Evaluate & 

Treat, Physical Therapy-Evaluate & Treat


Certify Stmt


I certify that this patient is under my care and that I, a nurse practitioner or

a physician; a assistant working with me, had a face to face encounter that -

meets the physician face to face encounter requirements with this patient as 

dated.











ANDREY PETIT DO                 Dec 3, 2020 17:00

## 2020-12-03 NOTE — PROGRESS NOTE - UROLOGY
Progress Note-Urology


Progress Notes/Assess & Plan


Progress/Assessment & Plan


DOING WELL OFF ALL EDS. WE WILL SEE PRN


Final Diagnosis


RETENTION (RESOLVED)











TAWIL,ELIAS A MD                Dec 3, 2020 10:08

## 2020-12-03 NOTE — PHYSICAL THERAPY DAILY NOTE
PT Daily Note-Current


Subjective


Pt sitting in recliner upon arrival.  Pt agrees to PT.  Pt is excited about DC 

tomorrow ().





Pain





   Location:  No Pain Reported





Mental Status


Patient Orientation:  Person, Place





Transfers


SCALE: Activities may be completed with or without assistive devices.





6-Indepedent-patient completes the activity by him/herself with no assistance 

from a helper.


5-Set-up or Clean-up Assistance-helper sets up or cleans up; patient completes 

activity. [place] assists only prior to or  


    following the activity.


4-Supervision or Touching Assistance-helper provides verbal cues and/or 

touching/steadying and/or contact guard assistance as patient completes 

activity. Assistance may be provided   


    throughout the activity or intermittently.


3-Partial/Moderate Assistance-helper does LESS THAN HALF the effort. [place] 

lifts, holds or supports trunk or limbs, but provides less than half the effort.


2-Substantial/Maximal Assistance-helper does MORE THAN HALF the effort. [place] 

lifts or holds trunk or limbs and provides more than half the effort.


3-Xfpadyzwp-ebozbu does ALL the effort. Patient does none of the effort to 

complete the activity. Or, the assistance of 2 or more helpers is required for 

the patient to complete the  


    activity.


If activity was not attempted, code reason:


7-Patient Refused.


9-Not Applicable-not attempted and the patient did not perform the activity 

before the current illness, exacerbation or injury.


10-Not Attempted due to Environmental Limitations-(lack of equipment, weather 

restraints, etc.).


88-Not Attempted due to Medical Conditions or Safety Concerns.


Roll Left & Right (QC):  5


Sit to Lying (QC):  6


Lying to Sitting/Side of Bed(Q:  5


Sit to Stand (QC):  5


Chair/Bed-to-Chair Xfer(QC):  5


Toilet Transfer (QC):  5


Car Transfer (QC):  6


Pt uses bed rails especially with turning and sitting up in bed.





Weight Bearing


Right Lower Extremity:  Right


Full Weight Bearing


Left Lower Extremity:  Left


Full Weight Bearing





Gait Training


Does the Patient Walk?:  Yes


Distance:  150' x2


Walk 10 feet (QC):  5


Walk 50 ft with 2 Turns(QC):  4


Walk 150 ft (QC):  4


Walking 10ft/uneven surface-QC:  4


Gait Persons Needed:  1


Gait Assistive Device:  Walker Platform


Pt needs VC to walk w/in walker and positioning hand on platform.





Wheelchair Training


Does the Pt Use a Wheelchair?:  Yes


Wheel 50 ft with 2 turns (QC):  5


Wheel 150 ft (QC):  5


Type of Wheelchair:  Manual





Stair Training


 Stair Training: Handrails/:  2 handrails


#of Steps:  4


1 Step (curb) (QC):  4


4 Steps (QC):  4


12 Steps (QC):  88


Stairs:  Pattern:  Step to





Balance


Picking up an Object (QC):  5


Special Test Comments


Sitting and using reacher.





Treatments


Pt completes QC scoring items listed above between both morning and afternoon 

session.  Pt ambulates in morning session but uses WCH for mobility as fatigued 

by afternoon session.  WCH will be more functional at home as pt requires 

increased VC to stay w/in walker.





Assessment


Current Status:  Good Progress


Pt has improved on activity tolerance and strength.  Pt still impulsive though 

and needs cues for safety.





PT Short Term Goals


Short Term Goals


Time Frame:  2020


Roll Left & Right:  4


Sit to lyin (met)


Lying to sitting on side of be:  3


Chair/bed-to-chair transfer:  3 (met)


Walk 10 feet:  3 (met)


Does pt use a wc or scooter:  Yes


Wheel 50ft w/2 turns:  3


Wheel 150 feet:  3





PT Long Term Goals


Long Term Goals


PT Long Term Goals Time Frame:  Dec 3, 2020


Roll Left & Right (QC):  6


Sit to Lying (QC):  6


Lying-Sitting on Side/Bed(QC):  6


Sit to Stand (QC):  4


Chair/Bed-to-Chair Xfer(QC):  4


Toilet Transfer (QC):  4


Car Transfer (QC):  4


Does the Patient Walk:  Yes


Walk 10 feet (QC):  3 (met)


Walk 50ft with 2 Turns (QC):  3 (met)


Walk 150 ft (QC):  88


Walking 10ft on Uneven Surface:  3


1 Step (curb) (QC):  3


4 Steps (QC):  88


12 Steps (QC):  88


Picking up an Object (QC):  88


Does the Pt use WC or Scooter?:  Yes


Wheel 50 feet with 2 turns (QC:  6


Wheel 150 feet:  6





PT Plan


Problem List


Problem List:  Activity Tolerance, Safety, Balance





Treatment/Plan


Treatment Plan:  Continue Plan of Care


Treatment Plan:  Bed Mobility, Education, Functional Activity Nona, Functional 

Strength, Group Therapy, Gait, Safety, Therapeutic Exercise, Transfers


Treatment Duration:  2020


Frequency:  At least 5 of 7 days/Wk (IRF)


Estimated Hrs Per Day:  1.5 hours per day


Patient and/or Family Agrees t:  Yes





Safety Risks/Education


Patient Education:  Gait Training, Transfer Techniques, Steps, Correct 

Positioning, W/C Management, Safety Issues


Teaching Recipient:  Patient


Teaching Methods:  Discussion


Response to Teaching:  Verbalize Understanding, Reinforcement Needed





Time/GCodes


Time In:  800


Time Out:  900


Total Billed Treatment Time:  60


Total Billed Treatment


Morning Session: 1, GT (20m), FA (15m) & EX x2 (25m)





Afternoon Session: ([id number]) 1, FA (15m)











PATEL WELLS PTA               Dec 3, 2020 09:02

## 2020-12-03 NOTE — PROGRESS NOTE - CARDIOLOGY
Cardiology SOAP Progress Note


Subjective:


Gen malaise and weakness


No shortness of breath


No cp or palp or syncope


No n/v/d





Objective:


I&O/Vital Signs











 12/3/20 12/3/20





 09:00 17:11


 


Temp  36.4


 


Pulse  77


 


Resp  18


 


B/P (MAP)  145/66 (92)


 


Pulse Ox  95


 


O2 Delivery Room Air Room Air














 12/3/20





 00:00


 


Intake Total 1200 ml


 


Balance 1200 ml








Constitutional:  AAO x 3, well-developed, well-nourished, other (mild expressive

aphasia)


Respiratory:  No accessory muscle use, No respiratory distress; chest expansion 

is symmetric, chest is bilaterally symmetric, lungs clear to auscultation


Cardiovascular:  regular rate-rhythm; No JVD; S1 and S2


Gastrointestional:  No tender; soft, round, audible bowel sounds


Extremities:  no lower extremity edema bilateral


Neurologic/Psychiatric:  other (RUE and RLE weakness)





A/P:


Assessment:





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

aphasia -  Plavix and ASA.





Coronary artery disease status post CABG 3





Hypertension





Hyperlipidemia





Questionable peripheral arterial disease with ischemic event after her bypass 

has improved after conservative management, continue on Plavix





Diabetes mellitus, followed and managed by primary care physician





Debility, receiving physical therapy


Plan:


Continue current cardiac regimen


Monitor lab











MENDOZA GIFFORD MD FACP FACC CCDS    Dec 3, 2020 19:32

## 2020-12-04 NOTE — PROGRESS NOTE - CARDIOLOGY
Cardiology SOAP Progress Note


Subjective:


Malaise present


No cp or palp or syncope or shortness of breath


No n/v/d





Objective:


I&O/Vital Signs











 12/4/20 12/4/20





 06:32 11:00


 


Temp 36.5 36.8


 


Pulse 68 72


 


Resp 20 20


 


B/P (MAP) 156/70 (98) 132/71


 


Pulse Ox 93 97


 


O2 Delivery Room Air Room Air














 12/4/20





 00:00


 


Intake Total 650 ml


 


Balance 650 ml








Constitutional:  AAO x 3, well-developed, well-nourished, other (mild expressive

aphasia)


Respiratory:  No accessory muscle use, No respiratory distress; chest expansion 

is symmetric, chest is bilaterally symmetric, lungs clear to auscultation


Cardiovascular:  regular rate-rhythm; No JVD; S1 and S2


Gastrointestional:  No tender; soft, round, audible bowel sounds


Extremities:  no lower extremity edema bilateral


Neurologic/Psychiatric:  other (right-sided weakness and mild expressive 

dysphasia)





A/P:


Assessment:





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

aphasia -  Plavix and ASA.





Coronary artery disease status post CABG 3





Hypertension





Hyperlipidemia





Questionable peripheral arterial disease with ischemic event after her bypass 

has improved after conservative management, continue on Plavix





Diabetes mellitus, followed and managed by primary care physician





Debility, receiving physical therapy


Plan:


Continue current cardiac regimen


Monitor lab


No cardiology coverage over the weekend. Primary attending managing











MENDOZA GIFFORD MD FACP FAC CCDS    Dec 4, 2020 16:55

## 2020-12-04 NOTE — THERAPY TEAM DISCHARGE SUMMARY
Therapy Discharge Summary


Discharge Recommendations


Date of Discharge








Physical Therapy


Patient came to rehab following a CVA.  Upon evaluation patient performed bed 

mobility with min assist, supine to sit with min assist, sit to supine mod 

assist, sit to stand min assist, transfers mod assist, car transfer mod assist, 

ambulated 3' in the parallel bars, and was dependent for WC mobility.  Patient 

has been performing bed mobility and transfer training, balance and endurance 

training, functional strengthening, stair training, gait training, and 

education.  Patient has made some progress and has met all of her long term 

goals except for WC mobility.  Now, patient performs bed mobility and transfers 

with setup, car transfer independent, ambulates 150' with a rolling walker with 

setup (including 50' with at least 2 turns of 90 degrees and 10' over an uneven 

surface, propels a manual WC with setup, can  an object from the floor 

with setup, and can go up and down 4 steps using 2 handrails with CGA.  Patient 

is discharging from this facility today and will be discharged from PT at this 

time.





Occupational Therapy


Decreased Activ Tolerance, Decreased Safety Aware, Decreased UE Strength, 

Dependent Transfers, Impaired Bed Mobility, Impaired Cognition, Impaired 

Coordination, Impaired Funct Balance, Impaired I ADL's, Impaired Self-Care 

Skills, Restricted Funct UE ROM, Visual-Perceptual Deficit





PT Long Term Goals


Long Term Goals


PT Long Term Goals Time Frame:  Dec 3, 2020


Roll Left to Right (QC):  6


Sit to Lying (QC):  6


Lying-Sitting on Side/Bed(QC):  6


Sit to Stand (QC):  4


Chair/Bed-to-Chair Xfer(QC):  4


Car Transfer (QC):  4


Does the Patient Walk:  Yes


Walk 10 feet (QC):  3 (met)


Walk 10ft-Uneven Surface(QC):  3


Walk 50ft with 2 Turns (QC):  3 (met)


Walk 150 ft (QC):  88


Does the Pt use WC or Scooter?:  Yes


Wheel 50 feet with 2 turns (QC:  6


1 Step (curb) (QC):  3


4 Steps (QC):  88


12 Steps (QC):  88


Picking up an Object (QC):  88





OT Long Term Goals


Long Term Goals


Time Frame:  Dec 10, 2020


Eating (QC):  4


Oral Hygiene (QC):  4


Shower/Bathe Self (QC):  3


Upper Body Dressing (QC):  5


Lower Body Dressing (QC):  4


On/Off Footwear (QC):  4


Toileting Hygiene (QC):  3


Toilet/Commode Transfer (QC):  4


Additional Goals:  1-Demonstrate ADL Tasks, 2-Verbalize Understanding, 3-

ImproveStrength/[place]


1=Demonstrate adherence to instructed precautions during ADL tasks.


2=Patient will verbalize/demonstrate understanding of assistive 

devices/modifications for ADL.


3=Patient will improve strength/tolerance for activity to enable patient to 

perform ADL's.





Speech Long Term Goals


Long Term Goals


Patient will improve communication abilities and safe oral intake in order to re

turn to prior level.











MELODY RAO PT                 Dec 4, 2020 10:20

## 2020-12-04 NOTE — NUR
CALL PLACED TO DR. BAH'S OFFICE TO SCHEDULE [facility], LEFT MESSAGE, THEY ARE 
GOING TO RETURN CALL

## 2020-12-04 NOTE — THERAPY TEAM DISCHARGE SUMMARY
Therapy Discharge Summary


Discharge Recommendations


Date of Discharge








Occupational Therapy


Decreased Activ Tolerance, Decreased Safety Aware, Decreased UE Strength, 

Dependent Transfers, Impaired Bed Mobility, Impaired Cognition, Impaired 

Coordination, Impaired Funct Balance, Impaired I ADL's, Impaired Self-Care Skil

ls, Restricted Funct UE ROM, Visual-Perceptual Deficit





Speech-Language Pathology


Patient was admitted to the ARU s/p CVA. Patient exhibited moderate expressive 

aphasia. She received skilled ST but made only minimal progress with her a

phasia. She was able to demo use of automatic speech but had significant 

difficulty with confrontational naming or answering direct questions. Patient 

discharged to her daughter's home this date where she will receive home health 

services.





PT Long Term Goals


Long Term Goals


PT Long Term Goals Time Frame:  Dec 3, 2020


Roll Left to Right (QC):  6


Sit to Lying (QC):  6


Lying-Sitting on Side/Bed(QC):  6


Sit to Stand (QC):  4


Chair/Bed-to-Chair Xfer(QC):  4


Car Transfer (QC):  4


Does the Patient Walk:  Yes


Walk 10 feet (QC):  3 (met)


Walk 10ft-Uneven Surface(QC):  3


Walk 50ft with 2 Turns (QC):  3 (met)


Walk 150 ft (QC):  88


Does the Pt use WC or Scooter?:  Yes


Wheel 50 feet with 2 turns (QC:  6


1 Step (curb) (QC):  3


4 Steps (QC):  88


12 Steps (QC):  88


Picking up an Object (QC):  88





OT Long Term Goals


Long Term Goals


Time Frame:  Dec 10, 2020


Eating (QC):  4


Oral Hygiene (QC):  4


Shower/Bathe Self (QC):  3


Upper Body Dressing (QC):  5


Lower Body Dressing (QC):  4


On/Off Footwear (QC):  4


Toileting Hygiene (QC):  3


Toilet/Commode Transfer (QC):  4


Additional Goals:  1-Demonstrate ADL Tasks, 2-Verbalize Understanding, 3-

ImproveStrength/[place]


1=Demonstrate adherence to instructed precautions during ADL tasks.


2=Patient will verbalize/demonstrate understanding of assistive 

devices/modifications for ADL.


3=Patient will improve strength/tolerance for activity to enable patient to 

perform ADL's.





Speech Long Term Goals


Long Term Goals


Patient will improve communication abilities and safe oral intake in order to 

return to prior level.











FRANCHESKA HOGAN             Dec 4, 2020 11:49

## 2020-12-04 NOTE — DISCHARGE SUMMARY
Diagnosis/Chief Complaint


Date of Admission


2020 at 12:30


Date of Discharge





Discharge Date:  Dec 4, 2020


Discharge Diagnosis


Assessment per Luke Bainbridge, MSIII:


Critical illness myopathy


S/P CVA involving left occipital artery


S/P 3 vessel CABG


Debility


Expressive aphasia


Dysarthria


Right sided weakness


Diabetes mellitus


COPD


CAD


Dysphagia requiring PEG now eating well


Dumont cath in place now DC and voiding well on Urecholine





Plan:


1. Ensure adequate anticoagulation


2. PT, OT, and SLP eval's and treat


3. Oxygen via NC- wean as tolerated


4. Resume Home medications


5. Fall precautions


6. Dietary consult


7. DVT prophylaxis





20:


Patient participating well


No pain reported


Continue current meds


Advance diet





20:


Dumont DC soon


Continue home meds


Therapies to continue protocol





11/15/20:


Dr Tawil appreciated


Monitor BP


Monitor labs





20:


Bladder meds


Appreciate Dr Tawil


PT OT ST





20:


Voiding trial


Monitor closely


Variable moods





20:


Monitor BP


Encourage PO intake


Dumont cath management





20:


Encourage participation with therapies


Monitor dumont cath





20:


Monitor closely


Bladder management





20:


Monitor closely


Cath maintained?





20:


Cath in place


Check labs in am


Increased dose of Urecholine





20:


Monitor bladder function


Increased dose of Urecholine





20:


Maintain dumont catheter


IRF protocol


Family evaluating DC plans





20:


DC next week


Dumont cath in place now


Monitor for falls





20:


Voiding well without cath now


Monitor closely





20:


DC accuchecks


Dramatic improvement since admit





20:


Monitor dysphagia


PT OT


Monitor voiding





20:


Decrease Urecholine


Monitor BP


Fall risk





20:


Monitor voiding


Fall risk


DC later this week





20:


Monitor closely


DC planning





20:


Monitor constipation


DC planned Friday





12/3/20:


DC planned


Patient sleeping at current time of seen


Discharge is planned for tomorrow


We will establish her care with me





The patient has a mobility limitation that significantly impairs his/her ability

to do one or more mobility-related activities of daily living in the home. 


The patient's mobility imitation can not be helped solely with a cane or walker.




The patient has enough room inside their residence for a wheelchair. 


The patient can safely use a wheelchair on their own or with the help of a 

caregiver.





(1) CVA (cerebral vascular accident)


(2) Dysarthria


(3) Dysphagia


(4) PEG (percutaneous endoscopic gastrostomy) status


(5) Expressive aphasia


(6) Smoker


(7) COPD (chronic obstructive pulmonary disease)


(8) CAD (coronary artery disease)


(9) Hx of CABG


(10) Dumont catheter in place


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





Discharge Summary


Discharge Physical Examination


Allergies:  


Coded Allergies:  


     No Known Drug Allergies (Unverified , 20)


Vitals & I&Os





Vital Signs








  Date Time  Temp Pulse Resp B/P (MAP) Pulse Ox O2 Delivery O2 Flow Rate FiO2


 


20 11:00 36.8 72 20 132/71 97 Room Air  








General Appearance:  Alert, Oriented X3, Cooperative


HEENT:  Atraumatic


Respiratory:  Clear to Auscultation


Cardiovascular:  Regular Rate


Psych/Mental Status:  Mental Status NL





Hospital Course


Was the Problem List Reviewed?:  Yes


Hospital course: Patient had an uneventful 23-day hospital course after admitted

after suffering a catastrophic stroke.  She had significant urinary retention 

with an indwelling catheter that required urology consultation and multiple 

medications and multiple catheterizations but ultimately patient was able to 

spontaneously void without difficulty and was weaned off Urecholine.  Patient 

did have cardiology consultation and multiple medications were recommended to 

prevent secondary complications from the stroke.  Nicotine patch maintained and 

smoking cessation was counseled.  She is able to participate in all therapies 

with PT and OT and speech therapy in order to regain enough function to actually

walk with assistance with platform walker along with wheelchair mobility skills 

and swallow without aspiration.  She will live with her daughter and she will 

establish with my clinic as her primary care provider.


Labs (last 24 hrs)


Laboratory Tests


20 16:54: Glucometer 73


20 20:44: Glucometer 185H


20 05:25: Glucometer 50*L


20 06:22: 


White Blood Count 5.8, Red Blood Count 4.25, Hemoglobin 12.2, Hematocrit 40, 

Mean Corpuscular Volume 94, Mean Corpuscular Hemoglobin 29, Mean Corpuscular 

Hemoglobin Concent 30L, Red Cell Distribution Width 15.6H, Platelet Count 346, 

Mean Platelet Volume 9.6, Immature Granulocyte % (Auto) 0, Neutrophils (%) 

(Auto) 61, Lymphocytes (%) (Auto) 27, Monocytes (%) (Auto) 10, Eosinophils (%) 

(Auto) 2, Basophils (%) (Auto) 1, Neutrophils # (Auto) 3.5, Lymphocytes # (Auto)

1.6, Monocytes # (Auto) 0.6, Eosinophils # (Auto) 0.1, Basophils # (Auto) 0.0, 

Immature Granulocyte # (Auto) 0.0, Sodium Level 139, Potassium Level 4.2, 

Chloride Level 102, Carbon Dioxide Level 23, Anion Gap 14, Blood Urea Nitrogen 

12, Creatinine 0.58L, Estimat Glomerular Filtration Rate > 60, BUN/Creatinine 

Ratio 21, Glucose Level 105, Calcium Level 9.3, Corrected Calcium 9.5, Total 

Bilirubin 0.3, Aspartate Amino Transf (AST/SGOT) 22, Alanine Aminotransferase 

(ALT/SGPT) 31, Alkaline Phosphatase 106, Total Protein 7.0, Albumin 3.8


20 06:47: Glucometer 116H


20 10:56: Glucometer 89


20 15:37: Glucometer 122H


20 20:04: Glucometer 122H


20 05:42: Glucometer 112H


20 11:40: Glucometer 143H


20 15:21: Glucometer 160H


20 20:03: Glucometer 159H


11/15/20 05:28: Glucometer 119H


11/15/20 10:42: Glucometer 147H


11/15/20 15:19: Glucometer 189H


11/15/20 20:06: Glucometer 185H


20 05:15: Glucometer 115H


20 05:31: 


White Blood Count 5.1, Red Blood Count 4.63, Hemoglobin 13.6, Hematocrit 43, 

Mean Corpuscular Volume 93, Mean Corpuscular Hemoglobin 29, Mean Corpuscular 

Hemoglobin Concent 32, Red Cell Distribution Width 14.8H, Platelet Count 211, 

Mean Platelet Volume 10.4, Immature Granulocyte % (Auto) 1, Neutrophils (%) 

(Auto) 53, Lymphocytes (%) (Auto) 31, Monocytes (%) (Auto) 10, Eosinophils (%) 

(Auto) 4, Basophils (%) (Auto) 1, Neutrophils # (Auto) 2.7, Lymphocytes # (Auto)

1.6, Monocytes # (Auto) 0.5, Eosinophils # (Auto) 0.2, Basophils # (Auto) 0.0, 

Immature Granulocyte # (Auto) 0.1, Sodium Level 136, Potassium Level 3.8, 

Chloride Level 102, Carbon Dioxide Level 20L, Anion Gap 14, Blood Urea Nitrogen 

8, Creatinine 0.57L, Estimat Glomerular Filtration Rate > 60, BUN/Creatinine 

Ratio 14, Glucose Level 124H, Calcium Level 9.2, Corrected Calcium 9.5, Total 

Bilirubin 0.4, Aspartate Amino Transf (AST/SGOT) 25, Alanine Aminotransferase 

(ALT/SGPT) 23, Alkaline Phosphatase 97, Total Protein 6.8, Albumin 3.6


20 10:53: Glucometer 179H


20 15:56: Glucometer 153H


20 20:57: Glucometer 165H


20 05:33: Glucometer 124H


20 10:46: Glucometer 157H


20 16:58: Glucometer 154H


20 20:43: Glucometer 158H


20 05:20: Glucometer 169H


20 10:14: Glucometer 166H


20 15:36: Glucometer 150H


20 05:34: Glucometer 147H


20 10:56: Glucometer 143H


20 15:39: Glucometer 168H


20 06:12: Glucometer 147H


20 16:03: Glucometer 125H


20 05:44: Glucometer 115H


20 16:19: Glucometer 139H


20 06:27: Glucometer 134H


20 16:10: Glucometer 174H


20 05:48: 


White Blood Count 6.1, Red Blood Count 4.15, Hemoglobin 11.7, Hematocrit 38, 

Mean Corpuscular Volume 92, Mean Corpuscular Hemoglobin 28, Mean Corpuscular 

Hemoglobin Concent 31L, Red Cell Distribution Width 15.0H, Platelet Count 353, 

Mean Platelet Volume 9.9, Immature Granulocyte % (Auto) 1, Neutrophils (%) 

(Auto) 58, Lymphocytes (%) (Auto) 29, Monocytes (%) (Auto) 10, Eosinophils (%) 

(Auto) 2, Basophils (%) (Auto) 1, Neutrophils # (Auto) 3.5, Lymphocytes # (Auto)

1.8, Monocytes # (Auto) 0.6, Eosinophils # (Auto) 0.1, Basophils # (Auto) 0.0, 

Immature Granulocyte # (Auto) 0.0, Sodium Level 141, Potassium Level 3.6, 

Chloride Level 107, Carbon Dioxide Level 22, Anion Gap 12, Blood Urea Nitrogen 

7, Creatinine 0.56L, Estimat Glomerular Filtration Rate > 60, BUN/Creatinine 

Ratio 13, Glucose Level 123H, Calcium Level 8.5, Corrected Calcium 8.9, Total 

Bilirubin 0.2, Aspartate Amino Transf (AST/SGOT) 20, Alanine Aminotransferase 

(ALT/SGPT) 19, Alkaline Phosphatase 94, Total Protein 6.4, Albumin 3.5


20 16:58: Glucometer 127H


20 05:52: Glucometer 141H


20 16:36: Glucometer 110


20 05:40: Glucometer 115H


20 16:27: Glucometer 122H


20 05:54: Glucometer 133H


20 15:43: Glucometer 117H


20 06:18: Glucometer 123H


20 04:45: 


White Blood Count 5.9, Red Blood Count 4.18, Hemoglobin 11.7, Hematocrit 38, 

Mean Corpuscular Volume 90, Mean Corpuscular Hemoglobin 28, Mean Corpuscular 

Hemoglobin Concent 31L, Red Cell Distribution Width 14.7H, Platelet Count 357, 

Mean Platelet Volume 9.8, Immature Granulocyte % (Auto) 0, Neutrophils (%) 

(Auto) 60, Lymphocytes (%) (Auto) 27, Monocytes (%) (Auto) 10, Eosinophils (%) 

(Auto) 2, Basophils (%) (Auto) 1, Neutrophils # (Auto) 3.5, Lymphocytes # (Auto)

1.6, Monocytes # (Auto) 0.6, Eosinophils # (Auto) 0.1, Basophils # (Auto) 0.0, 

Immature Granulocyte # (Auto) 0.0, Sodium Level 140, Potassium Level 3.7, 

Chloride Level 105, Carbon Dioxide Level 23, Anion Gap 12, Blood Urea Nitrogen 

8, Creatinine 0.55L, Estimat Glomerular Filtration Rate > 60, BUN/Creatinine 

Ratio 15, Glucose Level 140H, Calcium Level 8.9, Corrected Calcium 9.1, Total 

Bilirubin 0.2, Aspartate Amino Transf (AST/SGOT) 20, Alanine Aminotransferase 

(ALT/SGPT) 16, Alkaline Phosphatase 96, Total Protein 6.9, Albumin 3.7





Pending Labs


Laboratory Tests


20 16:54: Glucometer 73


20 20:44: Glucometer 185


20 05:25: Glucometer 50


20 06:22: 


White Blood Count 5.8, Red Blood Count 4.25, Hemoglobin 12.2, Hematocrit 40, 

Mean Corpuscular Volume 94, Mean Corpuscular Hemoglobin 29, Mean Corpuscular 

Hemoglobin Concent 30, Red Cell Distribution Width 15.6, Platelet Count 346, 

Mean Platelet Volume 9.6, Immature Granulocyte % (Auto) 0, Neutrophils (%) 

(Auto) 61, Lymphocytes (%) (Auto) 27, Monocytes (%) (Auto) 10, Eosinophils (%) 

(Auto) 2, Basophils (%) (Auto) 1, Neutrophils # (Auto) 3.5, Lymphocytes # (Auto)

1.6, Monocytes # (Auto) 0.6, Eosinophils # (Auto) 0.1, Basophils # (Auto) 0.0, 

Immature Granulocyte # (Auto) 0.0, Sodium Level 139, Potassium Level 4.2, 

Chloride Level 102, Carbon Dioxide Level 23, Anion Gap 14, Blood Urea Nitrogen 

12, Creatinine 0.58, Estimat Glomerular Filtration Rate > 60, BUN/Creatinine 

Ratio 21, Glucose Level 105, Calcium Level 9.3, Corrected Calcium 9.5, Total 

Bilirubin 0.3, Aspartate Amino Transf (AST/SGOT) 22, Alanine Aminotransferase 

(ALT/SGPT) 31, Alkaline Phosphatase 106, Total Protein 7.0, Albumin 3.8


20 06:47: Glucometer 116


20 10:56: Glucometer 89


20 15:37: Glucometer 122


20 20:04: Glucometer 122


20 05:42: Glucometer 112


20 11:40: Glucometer 143


20 15:21: Glucometer 160


20 20:03: Glucometer 159


11/15/20 05:28: Glucometer 119


11/15/20 10:42: Glucometer 147


11/15/20 15:19: Glucometer 189


11/15/20 20:06: Glucometer 185


20 05:15: Glucometer 115


20 05:31: 


White Blood Count 5.1, Red Blood Count 4.63, Hemoglobin 13.6, Hematocrit 43, 

Mean Corpuscular Volume 93, Mean Corpuscular Hemoglobin 29, Mean Corpuscular 

Hemoglobin Concent 32, Red Cell Distribution Width 14.8, Platelet Count 211, 

Mean Platelet Volume 10.4, Immature Granulocyte % (Auto) 1, Neutrophils (%) 

(Auto) 53, Lymphocytes (%) (Auto) 31, Monocytes (%) (Auto) 10, Eosinophils (%) 

(Auto) 4, Basophils (%) (Auto) 1, Neutrophils # (Auto) 2.7, Lymphocytes # (Auto)

1.6, Monocytes # (Auto) 0.5, Eosinophils # (Auto) 0.2, Basophils # (Auto) 0.0, 

Immature Granulocyte # (Auto) 0.1, Sodium Level 136, Potassium Level 3.8, 

Chloride Level 102, Carbon Dioxide Level 20, Anion Gap 14, Blood Urea Nitrogen 

8, Creatinine 0.57, Estimat Glomerular Filtration Rate > 60, BUN/Creatinine 

Ratio 14, Glucose Level 124, Calcium Level 9.2, Corrected Calcium 9.5, Total 

Bilirubin 0.4, Aspartate Amino Transf (AST/SGOT) 25, Alanine Aminotransferase 

(ALT/SGPT) 23, Alkaline Phosphatase 97, Total Protein 6.8, Albumin 3.6


20 10:53: Glucometer 179


20 15:56: Glucometer 153


20 20:57: Glucometer 165


20 05:33: Glucometer 124


20 10:46: Glucometer 157


20 16:58: Glucometer 154


20 20:43: Glucometer 158


20 05:20: Glucometer 169


20 10:14: Glucometer 166


20 15:36: Glucometer 150


20 05:34: Glucometer 147


20 10:56: Glucometer 143


20 15:39: Glucometer 168


20 06:12: Glucometer 147


20 16:03: Glucometer 125


20 05:44: Glucometer 115


20 16:19: Glucometer 139


20 06:27: Glucometer 134


20 16:10: Glucometer 174


20 05:48: 


White Blood Count 6.1, Red Blood Count 4.15, Hemoglobin 11.7, Hematocrit 38, 

Mean Corpuscular Volume 92, Mean Corpuscular Hemoglobin 28, Mean Corpuscular 

Hemoglobin Concent 31, Red Cell Distribution Width 15.0, Platelet Count 353, 

Mean Platelet Volume 9.9, Immature Granulocyte % (Auto) 1, Neutrophils (%) 

(Auto) 58, Lymphocytes (%) (Auto) 29, Monocytes (%) (Auto) 10, Eosinophils (%) 

(Auto) 2, Basophils (%) (Auto) 1, Neutrophils # (Auto) 3.5, Lymphocytes # (Auto)

1.8, Monocytes # (Auto) 0.6, Eosinophils # (Auto) 0.1, Basophils # (Auto) 0.0, 

Immature Granulocyte # (Auto) 0.0, Sodium Level 141, Potassium Level 3.6, 

Chloride Level 107, Carbon Dioxide Level 22, Anion Gap 12, Blood Urea Nitrogen 

7, Creatinine 0.56, Estimat Glomerular Filtration Rate > 60, BUN/Creatinine 

Ratio 13, Glucose Level 123, Calcium Level 8.5, Corrected Calcium 8.9, Total 

Bilirubin 0.2, Aspartate Amino Transf (AST/SGOT) 20, Alanine Aminotransferase 

(ALT/SGPT) 19, Alkaline Phosphatase 94, Total Protein 6.4, Albumin 3.5


20 16:58: Glucometer 127


20 05:52: Glucometer 141


20 16:36: Glucometer 110


20 05:40: Glucometer 115


20 16:27: Glucometer 122


20 05:54: Glucometer 133


20 15:43: Glucometer 117


20 06:18: Glucometer 123


20 04:45: 


White Blood Count 5.9, Red Blood Count 4.18, Hemoglobin 11.7, Hematocrit 38, 

Mean Corpuscular Volume 90, Mean Corpuscular Hemoglobin 28, Mean Corpuscular 

Hemoglobin Concent 31, Red Cell Distribution Width 14.7, Platelet Count 357, 

Mean Platelet Volume 9.8, Immature Granulocyte % (Auto) 0, Neutrophils (%) 

(Auto) 60, Lymphocytes (%) (Auto) 27, Monocytes (%) (Auto) 10, Eosinophils (%) 

(Auto) 2, Basophils (%) (Auto) 1, Neutrophils # (Auto) 3.5, Lymphocytes # (Auto)

1.6, Monocytes # (Auto) 0.6, Eosinophils # (Auto) 0.1, Basophils # (Auto) 0.0, 

Immature Granulocyte # (Auto) 0.0, Sodium Level 140, Potassium Level 3.7, 

Chloride Level 105, Carbon Dioxide Level 23, Anion Gap 12, Blood Urea Nitrogen 

8, Creatinine 0.55, Estimat Glomerular Filtration Rate > 60, BUN/Creatinine 

Ratio 15, Glucose Level 140, Calcium Level 8.9, Corrected Calcium 9.1, Total 

Bilirubin 0.2, Aspartate Amino Transf (AST/SGOT) 20, Alanine Aminotransferase 

(ALT/SGPT) 16, Alkaline Phosphatase 96, Total Protein 6.9, Albumin 3.7








Discharge


Home Medications:





Active Scripts


Active


Famotidine 20 Mg Tablet 20 Mg PO DAILY


Senna-Time S Tablet (Sennosides/Docusate Sodium) 1 Each Tablet 1 Ea PO BID


Iprat-Albut 0.5-3(2.5) mg/3 ml (Ipratropium/Albuterol Sulfate) 3 Ml Ampul.neb 3 

Ml IH RTBID


Budesonide 0.5 Mg/2 Ml Ampul.neb 0.5 Mg IH RTBID


Aspirin 81 Mg Tab.chew 81 Mg PO DAILY


Amlodipine Besylate 5 Mg Tablet 10 Mg PO DAILY


Carvedilol 3.125 Mg Tablet 3.125 Mg PO BID


Lipitor (Atorvastatin Calcium) 40 Mg Tablet 40 Mg PO DAILY


Clopidogrel (Clopidogrel Bisulfate) 75 Mg Tablet 75 Mg PO DAILY





Instructions to patient/family


Please see electronic discharge instructions given to patient.





Diagnosis/Problems


Diagnosis/Problems





(1) CVA (cerebral vascular accident)


(2) Dysarthria


(3) Dysphagia


(4) PEG (percutaneous endoscopic gastrostomy) status


(5) Expressive aphasia


(6) Smoker


(7) COPD (chronic obstructive pulmonary disease)


Qualifiers:  


   Qualified Codes:  J44.9 - Chronic obstructive pulmonary disease, unspecified


(8) CAD (coronary artery disease)


(9) Hx of CABG


(10) Dumont catheter in place


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





Clinical Quality Measures


DVT/VTE Risk/Contraindication:


Risk Factor Score Per Nursin


RFS Level Per Nursing on Admit:  4+=Very High











ANDREY BAH DO                 Dec 4, 2020 12:15

## 2020-12-04 NOTE — NUR
CM/SS DISCHARGE

Patient discharged home with her daughter and her spouse as planned.  She will now reside 
with them in [place] permanently.



[facility]:  [facility] will be providing services for patient, RN PT OT and ST.



DME:  [employer] delivered FWW with Right Platform, wheelchair.  Family had already secured all 
other recommended assistive devices.



IMM2 presented, reviewed, signed, charted.



Unit RN aware of all timelines.

## 2020-12-04 NOTE — NUR
Pt discharged home w dgtr/family. Pt was very happy & eager to go home, pt made good 
progress while on ARU.

## 2020-12-07 NOTE — THERAPY TEAM DISCHARGE SUMMARY
Therapy Discharge Summary


Discharge Recommendations


Date of Discharge


Dec 4, 2020 at 11:00


Therapy D/C Recommendations:  24 hr Supervision, Home w/ Family Support, 

Occupational Therapy Home Care





Occupational Therapy


Pt. has been seen by occupational therapy to increase overall strength and 

independence with daily skills.  Pt. has not met most goals due to requiring 

continued max assistance with most skills.  Pt. has difficulty due to right 

sided weakness, right sided neglect, aphasia, and ability to communicate.  Pt. 

has discharged home with family support, and would benefit from occupational 

therapy within the home.  Pt. did make great progress with transfers and ability

to ambulate with platform walker.  Please see PT note.


Decreased Activ Tolerance, Decreased Safety Aware, Decreased UE Strength, 

Dependent Transfers, Impaired Bed Mobility, Impaired Cognition, Impaired 

Coordination, Impaired Funct Balance, Impaired I ADL's, Impaired Self-Care 

Skills, Restricted Funct UE ROM, Visual-Perceptual Deficit





PT Long Term Goals


Long Term Goals


PT Long Term Goals Time Frame:  Dec 3, 2020


Roll Left to Right (QC):  6


Sit to Lying (QC):  6


Lying-Sitting on Side/Bed(QC):  6


Sit to Stand (QC):  4


Chair/Bed-to-Chair Xfer(QC):  4


Car Transfer (QC):  4


Does the Patient Walk:  Yes


Walk 10 feet (QC):  3 (met)


Walk 10ft-Uneven Surface(QC):  3


Walk 50ft with 2 Turns (QC):  3 (met)


Walk 150 ft (QC):  88


Does the Pt use WC or Scooter?:  Yes


Wheel 50 feet with 2 turns (QC:  6


1 Step (curb) (QC):  3


4 Steps (QC):  88


12 Steps (QC):  88


Picking up an Object (QC):  88





OT Long Term Goals


Long Term Goals


Time Frame:  Dec 10, 2020


Eating (QC):  4 (met)


Oral Hygiene (QC):  4 (met)


Shower/Bathe Self (QC):  3 (not met)


Upper Body Dressing (QC):  5 (not met)


Lower Body Dressing (QC):  4 (not met)


On/Off Footwear (QC):  4 (not met)


Toileting Hygiene (QC):  3 (not met)


Toilet/Commode Transfer (QC):  4 (not met)


Additional Goals:  1-Demonstrate ADL Tasks, 2-Verbalize Understanding, 3-

ImproveStrength/Nona


1=Demonstrate adherence to instructed precautions during ADL tasks.


2=Patient will verbalize/demonstrate understanding of assistive 

devices/modifications for ADL.


3=Patient will improve strength/tolerance for activity to enable patient to 

perform ADL's.





Speech Long Term Goals


Long Term Goals


Patient will improve communication abilities and safe oral intake in order to 

return to prior level.











ROSANNA LOVING OT            Dec 7, 2020 11:19

## 2021-02-26 ENCOUNTER — HOSPITAL ENCOUNTER (EMERGENCY)
Dept: HOSPITAL 75 - ER | Age: 76
Discharge: HOME | End: 2021-02-26
Payer: MEDICARE

## 2021-02-26 VITALS — DIASTOLIC BLOOD PRESSURE: 90 MMHG | SYSTOLIC BLOOD PRESSURE: 178 MMHG

## 2021-02-26 VITALS — BODY MASS INDEX: 27.78 KG/M2 | WEIGHT: 137.79 LBS | HEIGHT: 58.98 IN

## 2021-02-26 DIAGNOSIS — Z95.1: ICD-10-CM

## 2021-02-26 DIAGNOSIS — I10: ICD-10-CM

## 2021-02-26 DIAGNOSIS — I25.10: ICD-10-CM

## 2021-02-26 DIAGNOSIS — N39.0: ICD-10-CM

## 2021-02-26 DIAGNOSIS — Z79.82: ICD-10-CM

## 2021-02-26 DIAGNOSIS — Z86.73: ICD-10-CM

## 2021-02-26 DIAGNOSIS — R07.9: Primary | ICD-10-CM

## 2021-02-26 DIAGNOSIS — E78.00: ICD-10-CM

## 2021-02-26 DIAGNOSIS — Z87.891: ICD-10-CM

## 2021-02-26 DIAGNOSIS — Z95.9: ICD-10-CM

## 2021-02-26 DIAGNOSIS — J44.9: ICD-10-CM

## 2021-02-26 LAB
ALBUMIN SERPL-MCNC: 4.1 GM/DL (ref 3.2–4.5)
ALP SERPL-CCNC: 100 U/L (ref 40–136)
ALT SERPL-CCNC: 14 U/L (ref 0–55)
AMYLASE SERPL-CCNC: 56 U/L (ref 25–125)
APTT BLD: 34 SEC (ref 24–35)
APTT PPP: YELLOW S
BACTERIA #/AREA URNS HPF: (no result) /HPF
BASOPHILS # BLD AUTO: 0 10^3/UL (ref 0–0.1)
BASOPHILS NFR BLD AUTO: 1 % (ref 0–10)
BILIRUB SERPL-MCNC: 0.2 MG/DL (ref 0.1–1)
BILIRUB UR QL STRIP: NEGATIVE
BUN/CREAT SERPL: 15
CALCIUM SERPL-MCNC: 9 MG/DL (ref 8.5–10.1)
CHLORIDE SERPL-SCNC: 106 MMOL/L (ref 98–107)
CO2 SERPL-SCNC: 24 MMOL/L (ref 21–32)
CREAT SERPL-MCNC: 0.65 MG/DL (ref 0.6–1.3)
D DIMER PPP FEU-MCNC: 0.74 UG/ML (ref 0–0.49)
EOSINOPHIL # BLD AUTO: 0.1 10^3/UL (ref 0–0.3)
EOSINOPHIL NFR BLD AUTO: 2 % (ref 0–10)
FIBRINOGEN PPP-MCNC: CLEAR MG/DL
GFR SERPLBLD BASED ON 1.73 SQ M-ARVRAT: > 60 ML/MIN
GLUCOSE SERPL-MCNC: 127 MG/DL (ref 70–105)
GLUCOSE UR STRIP-MCNC: NEGATIVE MG/DL
HCT VFR BLD CALC: 40 % (ref 35–52)
HGB BLD-MCNC: 12.6 G/DL (ref 11.5–16)
INR PPP: 0.9 (ref 0.8–1.4)
KETONES UR QL STRIP: NEGATIVE
LEUKOCYTE ESTERASE UR QL STRIP: (no result)
LYMPHOCYTES # BLD AUTO: 2.1 10^3/UL (ref 1–4)
LYMPHOCYTES NFR BLD AUTO: 30 % (ref 12–44)
MAGNESIUM SERPL-MCNC: 1.9 MG/DL (ref 1.6–2.4)
MANUAL DIFFERENTIAL PERFORMED BLD QL: NO
MCH RBC QN AUTO: 26 PG (ref 25–34)
MCHC RBC AUTO-ENTMCNC: 31 G/DL (ref 32–36)
MCV RBC AUTO: 83 FL (ref 80–99)
MONOCYTES # BLD AUTO: 0.6 10^3/UL (ref 0–1)
MONOCYTES NFR BLD AUTO: 9 % (ref 0–12)
NEUTROPHILS # BLD AUTO: 4 10^3/UL (ref 1.8–7.8)
NEUTROPHILS NFR BLD AUTO: 59 % (ref 42–75)
NITRITE UR QL STRIP: NEGATIVE
PH UR STRIP: 6 [PH] (ref 5–9)
PLATELET # BLD: 355 10^3/UL (ref 130–400)
PMV BLD AUTO: 9.5 FL (ref 9–12.2)
POTASSIUM SERPL-SCNC: 3.9 MMOL/L (ref 3.6–5)
PROT SERPL-MCNC: 7.7 GM/DL (ref 6.4–8.2)
PROT UR QL STRIP: NEGATIVE
PROTHROMBIN TIME: 12.8 SEC (ref 12.2–14.7)
RBC #/AREA URNS HPF: (no result) /HPF
SODIUM SERPL-SCNC: 140 MMOL/L (ref 135–145)
SP GR UR STRIP: <=1.005 (ref 1.02–1.02)
SQUAMOUS #/AREA URNS HPF: (no result) /HPF
WBC # BLD AUTO: 6.8 10^3/UL (ref 4.3–11)
WBC #/AREA URNS HPF: (no result) /HPF

## 2021-02-26 PROCEDURE — 85730 THROMBOPLASTIN TIME PARTIAL: CPT

## 2021-02-26 PROCEDURE — 83874 ASSAY OF MYOGLOBIN: CPT

## 2021-02-26 PROCEDURE — 82150 ASSAY OF AMYLASE: CPT

## 2021-02-26 PROCEDURE — 36415 COLL VENOUS BLD VENIPUNCTURE: CPT

## 2021-02-26 PROCEDURE — 87088 URINE BACTERIA CULTURE: CPT

## 2021-02-26 PROCEDURE — 84484 ASSAY OF TROPONIN QUANT: CPT

## 2021-02-26 PROCEDURE — 85610 PROTHROMBIN TIME: CPT

## 2021-02-26 PROCEDURE — 93041 RHYTHM ECG TRACING: CPT

## 2021-02-26 PROCEDURE — 80053 COMPREHEN METABOLIC PANEL: CPT

## 2021-02-26 PROCEDURE — 85025 COMPLETE CBC W/AUTO DIFF WBC: CPT

## 2021-02-26 PROCEDURE — 71045 X-RAY EXAM CHEST 1 VIEW: CPT

## 2021-02-26 PROCEDURE — 82962 GLUCOSE BLOOD TEST: CPT

## 2021-02-26 PROCEDURE — 85379 FIBRIN DEGRADATION QUANT: CPT

## 2021-02-26 PROCEDURE — 70450 CT HEAD/BRAIN W/O DYE: CPT

## 2021-02-26 PROCEDURE — 83735 ASSAY OF MAGNESIUM: CPT

## 2021-02-26 PROCEDURE — 83880 ASSAY OF NATRIURETIC PEPTIDE: CPT

## 2021-02-26 PROCEDURE — 81000 URINALYSIS NONAUTO W/SCOPE: CPT

## 2021-02-26 PROCEDURE — 93005 ELECTROCARDIOGRAM TRACING: CPT

## 2021-02-26 NOTE — ED CHEST PAIN
General


Chief Complaint:  Cardiac/General Problems


Stated Complaint:  STROKE LIKE SYMPTOMS


Nursing Triage Note:  


PT AMB TO [facility] WITH COMPLAINT OF CP AND FELT LIKE HEART WAS BEATING OUT OF 


CHEST. STATES STARTED THIS MORNING.


Nursing Sepsis Screen:  No Definite Risk


Source:  patient, family


Exam Limitations:  no limitations





History of Present Illness


Date Seen by Provider:  Feb 26, 2021


Time Seen by Provider:  12:45


Initial Comments


75-year-old female who presents to the emergency room with complaints of left-

sided chest pain and feels like her heart is beating out of her chest.  She 

reports that this started this morning around 9 AM.  Family was concerned that 

she was having a stroke because she is having left-sided facial tingling.  

Family reports that she had a quadruple bypass last October and while in the 

hospital had a stroke. She was admitted to [facility] inpatient rehab after 

discharge from the hospital.  NIH of 0 on arrival.


Timing/Duration:  1-3 hours


Severity/Quality:  moderate


Location:  substernal


Radiation:  other (Left cheek)


Activities at Onset:  none


Prior CP/Workup:  cardiac cath, heart attack


ASA po PTA:  No


NTG SL PTA:  No





Allergies and Home Medications


Allergies


Coded Allergies:  


     No Known Drug Allergies (Unverified , 11/12/20)





Home Medications


Amlodipine Besylate 5 Mg Tablet, 10 MG PO DAILY


   Prescribed by: ANDREY BAH on 12/3/20 1651


Aspirin 81 Mg Tab.chew, 81 MG PO DAILY


   Prescribed by: ANDREY BAH on 12/3/20 1651


Atorvastatin Calcium 40 Mg Tablet, 40 MG PO DAILY


   Prescribed by: ANDREY BAH on 12/3/20 1651


Budesonide 0.5 Mg/2 Ml Ampul.neb, 0.5 MG IH RTBID


   Prescribed by: ANDREY BAH on 12/3/20 1651


Carvedilol 3.125 Mg Tablet, 3.125 MG PO BID


   Prescribed by: ANRDEY BAH on 12/3/20 1651


Cephalexin 500 Mg Tablet, 500 MG PO TID


   Prescribed by: IFEOMA MARTINES on 2/26/21 1530


Clopidogrel Bisulfate 75 Mg Tablet, 75 MG PO DAILY


   Prescribed by: ANDREY BAH on 12/3/20 1651


Famotidine 20 Mg Tablet, 20 MG PO DAILY


   Prescribed by: ANDREY BAH on 12/3/20 1651


Ipratropium/Albuterol Sulfate 3 Ml Ampul.neb, 3 ML IH RTBID


   Prescribed by: ANDREY BAH on 12/3/20 1651


Sennosides/Docusate Sodium 1 Each Tablet, 1 EA PO BID


   Prescribed by: ANDREY BAH on 12/3/20 1651





Patient Home Medication List


Home Medication List Reviewed:  Yes





Review of Systems


Review of Systems


Constitutional:  see HPI; No chills, No fever


Cardiovascular:  See HPI, Chest Pain





All Other Systems Reviewed


Negative Unless Noted:  Yes





Past Medical-Social-Family Hx


Patient Social History


Alcohol Use:  Denies Use


Smoking Status:  Former Smoker


Type Used:  Cigarettes


Recent Infectious Disease Expo:  No


Recent Hopitalizations:  No (CVA AND BYPASS SURGERY)





Immunizations Up To Date


Tetanus Booster (TDap):  Unknown


Date of Influenza Vaccine:  Nov 10, 2020





Seasonal Allergies


Seasonal Allergies:  No





Past Medical History


Surgeries:  Yes


Cardiac, CABG, Open Heart Surgery


Respiratory:  Yes


COPD


Currently Using CPAP:  No


Currently Using BIPAP:  No


Cardiac:  Yes (STENTS, BYPASS )


Cardiomyopathy, Coronary Artery Disease, High Cholesterol, Hypertension


Neurological:  No


Stroke


Sexually Transmitted Disease:  No


HIV/AIDS:  No


Genitourinary:  No


Gastrointestinal:  No


Musculoskeletal:  No


Arthritis


Endocrine:  No


Diabetes, Non-Insulin dep


HEENT:  No


Hearing Impairment:  Denies


Cancer:  No


Psychosocial:  No


Integumentary:  No


Blood Disorders:  No





Family Medical History





Patient reports no known family medical history.





Physical Exam


Vital Signs





Vital Signs - First Documented








 2/26/21 2/26/21





 12:45 16:12


 


Temp 36.2 


 


Pulse 92 


 


Resp 20 


 


B/P (MAP) 148/80 (102) 


 


Pulse Ox  100


 


O2 Delivery Room Air 





Capillary Refill : Less Than 3 Seconds


Height, Weight, BMI


Height: '"


Weight: lbs. oz. kg; 27.00 BMI


Method:


General Appearance:  No Apparent Distress, WD/WN


HEENT:  PERRL/EOMI, TMs Normal, Normal ENT Inspection, Pharynx Normal


Neck:  Full Range of Motion, Normal Inspection, Non Tender


Respiratory:  Chest Non Tender, Lungs Clear, Normal Breath Sounds, No Accessory 

Muscle Use, No Respiratory Distress


Cardiovascular:  Regular Rate, Rhythm, No Edema, No Gallop, No JVD, No Murmur, 

Normal Peripheral Pulses


Gastrointestinal:  Normal Bowel Sounds, No Organomegaly, No Pulsatile Mass, Non 

Tender


Extremity:  Normal Capillary Refill, No Calf Tenderness, No Pedal Edema


Neurologic/Psychiatric:  Alert, Oriented x3, Normal Mood/Affect


Skin:  Normal Color, Warm/Dry





Progress/Results/Core Measures


Results/Orders


Lab Results





Laboratory Tests








Test


 2/26/21


13:00 2/26/21


13:04 2/26/21


13:14 2/26/21


15:14 Range/Units


 


 


White Blood Count


 6.8 


 


 


 


 4.3-11.0


10^3/uL


 


Red Blood Count


 4.85 


 


 


 


 3.80-5.11


10^6/uL


 


Hemoglobin 12.6     11.5-16.0  g/dL


 


Hematocrit 40     35-52  %


 


Mean Corpuscular Volume 83     80-99  fL


 


Mean Corpuscular Hemoglobin 26     25-34  pg


 


Mean Corpuscular Hemoglobin


Concent 31 L


 


 


 


 32-36  g/dL





 


Red Cell Distribution Width 16.3 H    10.0-14.5  %


 


Platelet Count


 355 


 


 


 


 130-400


10^3/uL


 


Mean Platelet Volume 9.5     9.0-12.2  fL


 


Immature Granulocyte % (Auto) 0      %


 


Neutrophils (%) (Auto) 59     42-75  %


 


Lymphocytes (%) (Auto) 30     12-44  %


 


Monocytes (%) (Auto) 9     0-12  %


 


Eosinophils (%) (Auto) 2     0-10  %


 


Basophils (%) (Auto) 1     0-10  %


 


Neutrophils # (Auto)


 4.0 


 


 


 


 1.8-7.8


10^3/uL


 


Lymphocytes # (Auto)


 2.1 


 


 


 


 1.0-4.0


10^3/uL


 


Monocytes # (Auto)


 0.6 


 


 


 


 0.0-1.0


10^3/uL


 


Eosinophils # (Auto)


 0.1 


 


 


 


 0.0-0.3


10^3/uL


 


Basophils # (Auto)


 0.0 


 


 


 


 0.0-0.1


10^3/uL


 


Immature Granulocyte # (Auto)


 0.0 


 


 


 


 0.0-0.1


10^3/uL


 


Prothrombin Time 12.8     12.2-14.7  SEC


 


INR Comment 0.9     0.8-1.4  


 


Activated Partial


Thromboplast Time 34 


 


 


 


 24-35  SEC





 


D-Dimer


 0.74 H


 


 


 


 0.00-0.49


UG/ML


 


Sodium Level 140     135-145  MMOL/L


 


Potassium Level 3.9     3.6-5.0  MMOL/L


 


Chloride Level 106       MMOL/L


 


Carbon Dioxide Level 24     21-32  MMOL/L


 


Anion Gap 10     5-14  MMOL/L


 


Blood Urea Nitrogen 10     7-18  MG/DL


 


Creatinine


 0.65 


 


 


 


 0.60-1.30


MG/DL


 


Estimat Glomerular Filtration


Rate > 60 


 


 


 


  





 


BUN/Creatinine Ratio 15      


 


Glucose Level 127 H      MG/DL


 


Calcium Level 9.0     8.5-10.1  MG/DL


 


Corrected Calcium 8.9     8.5-10.1  MG/DL


 


Magnesium Level 1.9     1.6-2.4  MG/DL


 


Total Bilirubin 0.2     0.1-1.0  MG/DL


 


Aspartate Amino Transf


(AST/SGOT) 13 


 


 


 


 5-34  U/L





 


Alanine Aminotransferase


(ALT/SGPT) 14 


 


 


 


 0-55  U/L





 


Alkaline Phosphatase 100       U/L


 


Myoglobin


 18.4 


 


 


 


 10.0-92.0


NG/ML


 


Troponin I < 0.028    < 0.028  <0.028  NG/ML


 


B-Type Natriuretic Peptide 93.8     <100.0  PG/ML


 


Total Protein 7.7     6.4-8.2  GM/DL


 


Albumin 4.1     3.2-4.5  GM/DL


 


Amylase Level 56       U/L


 


Glucometer  117 H     MG/DL


 


Urine Color   YELLOW    


 


Urine Clarity   CLEAR    


 


Urine pH   6.0   5-9  


 


Urine Specific Gravity   <=1.005   1.016-1.022  


 


Urine Protein   NEGATIVE   NEGATIVE  


 


Urine Glucose (UA)   NEGATIVE   NEGATIVE  


 


Urine Ketones   NEGATIVE   NEGATIVE  


 


Urine Nitrite   NEGATIVE   NEGATIVE  


 


Urine Bilirubin   NEGATIVE   NEGATIVE  


 


Urine Urobilinogen   0.2   < = 1.0  MG/DL


 


Urine Leukocyte Esterase   2+ H  NEGATIVE  


 


Urine RBC (Auto)   NEGATIVE   NEGATIVE  


 


Urine RBC   NONE    /HPF


 


Urine WBC   25-50 H   /HPF


 


Urine Squamous Epithelial


Cells 


 


 25-50 H


 


  /HPF





 


Urine Crystals   NONE    /LPF


 


Urine Bacteria   FEW H   /HPF


 


Urine Casts   NONE    /LPF


 


Urine Mucus   NEGATIVE    /LPF


 


Urine Culture Indicated   YES    








My Orders





Orders - BERNOT,IFEOMA


Cbc With Automated Diff (2/26/21 12:45)


Protime With Inr (2/26/21 12:45)


Partial Thromboplastin Time (2/26/21 12:45)


Comprehensive Metabolic Panel (2/26/21 12:45)


Fibrin Degradation Products (2/26/21 12:45)


Troponin I (2/26/21 12:45)


Ua Culture If Indicated (2/26/21 12:45)


Chest 1 View, Ap/Pa Only (2/26/21 12:45)


Ekg Tracing (2/26/21 12:45)


Accucheck Stat ONCE (2/26/21 12:45)


Ed Iv/Invasive Line Start (2/26/21 12:45)


Ed Iv/Invasive Line Start (2/26/21 12:45)


Vital Signs Stroke Patient Q15M (2/26/21 12:45)


Ct Head Wo-R/O Stroke (2/26/21 12:45)


O2 (2/26/21 12:45)


Intake & Output 06,14,22 (2/26/21 12:45)


Monitor-Rhythm Ecg Trace Only (2/26/21 12:45)


Dysphagia Screening Tool (2/26/21 12:45)


Post Thrombolytic Adminstratio (2/26/21 12:45)


Aspirin Chewable Tablet (Baby Aspirin Ch (2/26/21 13:00)


Magnesium (2/26/21 13:07)


Myoglobin Serum (2/26/21 13:07)


Amylase (2/26/21 13:07)


BNP (2/26/21 13:07)


Urine Culture (2/26/21 13:14)


Iohexol Injection (Omnipaque 350 Mg/Ml 1 (2/26/21 14:00)


Received Contrast (Hold Metformin- Contr (2/26/21 14:00)


Ns (Ivpb) (Sodium Chloride 0.9% Ivpb Bag (2/26/21 14:00)


Troponin I (2/26/21 14:55)





Medications Given in ED





Current Medications








 Medications  Dose


 Ordered  Sig/Bj


 Route  Start Time


 Stop Time Status Last Admin


Dose Admin


 


 Aspirin  324 mg  ONCE  ONCE


 PO  2/26/21 13:00


 2/26/21 13:01 DC 2/26/21 13:00


324 MG








Vital Signs/I&O











 2/26/21 2/26/21





 12:45 16:12


 


Temp 36.2 


 


Pulse 92 75


 


Resp 20 15


 


B/P (MAP) 148/80 (102) 178/90


 


Pulse Ox  100


 


O2 Delivery Room Air Room Air














Blood Pressure Mean:                    102











Progress


Progress Note :  


   Time:  14:25


Progress Note


I have discussed the case with Dr. Bah at this time.  She recommends not 

doing the CT angio given that the D-dimer is just slightly elevated.  She 

recommends repeating the troponin, starting the patient on antibiotics for UTI, 

having her follow-up in her office next week for an appointment.





Departure


Impression





   Primary Impression:  


   Chest pain


   Additional Impression:  


   UTI (urinary tract infection)


Disposition:  01 HOME, SELF-CARE


Condition:  Stable/Unchanged





Departure-Patient Inst.


Decision time for Depature:  15:27


Referrals:  


NO,LOCAL PHYSICIAN (PCP/Family)


Primary Care Physician


Patient Instructions:  Urinary Tract Infection, Adult (DC), Chest Pain (DC)





Add. Discharge Instructions:  


Resume your home medications as previously prescribed.  Take antibiotics as 

directed.  Drink plenty of fluids and stay hydrated to help flush out your 

kidneys and urinary tract.  Call Dr. Bah's office first thing Monday morning 

for an appointment time.  Return back to the emergency room for worsening 

symptoms or concerns as needed.





All discharge instructions reviewed with patient and/or family. Voiced 

understanding.


Scripts


Cephalexin (Cephalexin) 500 Mg Tablet


500 MG PO TID for 7 Days, #21 TAB


   Prov: IFEOMA MARTINES         2/26/21











IFEOMA MARTINES                  Feb 26, 2021 13:07

## 2021-02-26 NOTE — DIAGNOSTIC IMAGING REPORT
INDICATION: Stroke.



FINDINGS: There is elevation of the left hemidiaphragm. There are

postsurgical changes about the left hilum. There is scarring or

atelectasis in left lung base. There has been previous median

sternotomy and coronary bypass graft. Right lung is clear. No

pneumothorax.  



IMPRESSION: Elevation of left hemidiaphragm with some scarring or

atelectasis of the left lung base.



Cardiomegaly.



Dictated by: 



  Dictated on workstation # [id number]

## 2021-02-26 NOTE — DIAGNOSTIC IMAGING REPORT
PROCEDURE: CT head wo r/o stroke.



TECHNIQUE: Multiple contiguous axial images were obtained through

the brain without the use of intravenous contrast. Auto Exposure

Controls were utilized during the CT exam to meet ALARA standards

for radiation dose reduction. 



INDICATION: Stroke. Speech problems. Left-sided facial droop. 



COMPARISON: None.



FINDINGS: Chronic infarcts in the posterior left frontal lobe and

throughout the entire left PCA distribution. Chronic lacunar

infarct in the right thalamus and deep white matter of the right

frontal lobe. Moderate generalized cerebral and cerebellar

parenchymal volume loss. No CT evidence of an acute territorial

infarction. No intracranial hemorrhage, mass effect or

extra-axial fluid collections. Osseous structures are intact.

Visualized paranasal sinuses and mastoids are clear.



IMPRESSION: 

1. No acute intracranial CT findings.

2. Multiple chronic infarcts as above.



Dictated by: 



  Dictated on workstation # [id number]

## 2021-03-19 ENCOUNTER — HOSPITAL ENCOUNTER (OUTPATIENT)
Dept: HOSPITAL 75 - RAD | Age: 76
End: 2021-03-19
Attending: INTERNAL MEDICINE
Payer: MEDICARE

## 2021-03-19 DIAGNOSIS — I72.4: Primary | ICD-10-CM

## 2021-03-19 PROCEDURE — 93926 LOWER EXTREMITY STUDY: CPT

## 2021-03-19 NOTE — DIAGNOSTIC IMAGING REPORT
INDICATION: Pseudoaneurysm check. 



EXAMINATION: Right lower extremity arterial Doppler. Spectral and

color-flow imaging was utilized. 



COMPARISON: There is no prior study available for comparison.



FINDINGS: There is a 2.9 x 2.4 x 3.0 cm pseudoaneurysm in the

region of the common femoral artery. There is also a 4.2 x 3.3 x

4.3 cm area of altered echogenicity in this region which may

represent a hematoma.



No other acute vascular abnormality is identified.



IMPRESSION: There does appear to be a 2.9 x 2.4 x 3.0 cm

pseudoaneurysm arising from the common femoral artery on the

right. There also appears to be a hematoma in this area. 



Dictated by: 



  Dictated on workstation # [id number]

## 2021-07-14 ENCOUNTER — HOSPITAL ENCOUNTER (EMERGENCY)
Dept: HOSPITAL 75 - ER | Age: 76
Discharge: HOME | End: 2021-07-14
Payer: MEDICARE

## 2021-07-14 VITALS — SYSTOLIC BLOOD PRESSURE: 119 MMHG | DIASTOLIC BLOOD PRESSURE: 47 MMHG

## 2021-07-14 VITALS — HEIGHT: 57.87 IN | BODY MASS INDEX: 27.49 KG/M2 | WEIGHT: 130.95 LBS

## 2021-07-14 DIAGNOSIS — J44.9: Primary | ICD-10-CM

## 2021-07-14 DIAGNOSIS — Z20.822: ICD-10-CM

## 2021-07-14 DIAGNOSIS — Z79.899: ICD-10-CM

## 2021-07-14 DIAGNOSIS — N39.0: ICD-10-CM

## 2021-07-14 DIAGNOSIS — I10: ICD-10-CM

## 2021-07-14 DIAGNOSIS — E78.00: ICD-10-CM

## 2021-07-14 DIAGNOSIS — Z79.82: ICD-10-CM

## 2021-07-14 DIAGNOSIS — Z95.5: ICD-10-CM

## 2021-07-14 DIAGNOSIS — I69.351: ICD-10-CM

## 2021-07-14 DIAGNOSIS — Z79.51: ICD-10-CM

## 2021-07-14 DIAGNOSIS — I25.10: ICD-10-CM

## 2021-07-14 DIAGNOSIS — Z95.1: ICD-10-CM

## 2021-07-14 LAB
ALBUMIN SERPL-MCNC: 3.9 GM/DL (ref 3.2–4.5)
ALP SERPL-CCNC: 108 U/L (ref 40–136)
ALT SERPL-CCNC: 15 U/L (ref 0–55)
APTT PPP: (no result) S
BACTERIA #/AREA URNS HPF: (no result) /HPF
BASOPHILS # BLD AUTO: 0 10^3/UL (ref 0–0.1)
BASOPHILS NFR BLD AUTO: 0 % (ref 0–10)
BILIRUB SERPL-MCNC: 0.6 MG/DL (ref 0.1–1)
BILIRUB UR QL STRIP: NEGATIVE
BUN/CREAT SERPL: 15
CALCIUM SERPL-MCNC: 9.3 MG/DL (ref 8.5–10.1)
CHLORIDE SERPL-SCNC: 97 MMOL/L (ref 98–107)
CO2 SERPL-SCNC: 19 MMOL/L (ref 21–32)
CREAT SERPL-MCNC: 0.81 MG/DL (ref 0.6–1.3)
EOSINOPHIL # BLD AUTO: 0 10^3/UL (ref 0–0.3)
EOSINOPHIL NFR BLD AUTO: 0 % (ref 0–10)
FIBRINOGEN PPP-MCNC: (no result) MG/DL
GFR SERPLBLD BASED ON 1.73 SQ M-ARVRAT: > 60 ML/MIN
GLUCOSE SERPL-MCNC: 152 MG/DL (ref 70–105)
GLUCOSE UR STRIP-MCNC: NEGATIVE MG/DL
HCT VFR BLD CALC: 40 % (ref 35–52)
HGB BLD-MCNC: 12.9 G/DL (ref 11.5–16)
KETONES UR QL STRIP: (no result)
LEUKOCYTE ESTERASE UR QL STRIP: (no result)
LYMPHOCYTES # BLD AUTO: 1.5 10^3/UL (ref 1–4)
LYMPHOCYTES NFR BLD AUTO: 15 % (ref 12–44)
MAGNESIUM SERPL-MCNC: 2 MG/DL (ref 1.6–2.4)
MANUAL DIFFERENTIAL PERFORMED BLD QL: NO
MCH RBC QN AUTO: 26 PG (ref 25–34)
MCHC RBC AUTO-ENTMCNC: 32 G/DL (ref 32–36)
MCV RBC AUTO: 80 FL (ref 80–99)
MONOCYTES # BLD AUTO: 1.4 10^3/UL (ref 0–1)
MONOCYTES NFR BLD AUTO: 14 % (ref 0–12)
NEUTROPHILS # BLD AUTO: 6.9 10^3/UL (ref 1.8–7.8)
NEUTROPHILS NFR BLD AUTO: 70 % (ref 42–75)
NITRITE UR QL STRIP: POSITIVE
PH UR STRIP: 6 [PH] (ref 5–9)
PLATELET # BLD: 337 10^3/UL (ref 130–400)
PMV BLD AUTO: 10 FL (ref 9–12.2)
POTASSIUM SERPL-SCNC: 5.3 MMOL/L (ref 3.6–5)
PROT SERPL-MCNC: 8.7 GM/DL (ref 6.4–8.2)
PROT UR QL STRIP: (no result)
RBC #/AREA URNS HPF: (no result) /HPF
RENAL EPI CELLS #/AREA URNS HPF: (no result) /HPF
SODIUM SERPL-SCNC: 132 MMOL/L (ref 135–145)
SP GR UR STRIP: 1.02 (ref 1.02–1.02)
WBC # BLD AUTO: 9.8 10^3/UL (ref 4.3–11)
WBC #/AREA URNS HPF: (no result) /HPF

## 2021-07-14 PROCEDURE — 87077 CULTURE AEROBIC IDENTIFY: CPT

## 2021-07-14 PROCEDURE — 87088 URINE BACTERIA CULTURE: CPT

## 2021-07-14 PROCEDURE — 81000 URINALYSIS NONAUTO W/SCOPE: CPT

## 2021-07-14 PROCEDURE — 83880 ASSAY OF NATRIURETIC PEPTIDE: CPT

## 2021-07-14 PROCEDURE — 80053 COMPREHEN METABOLIC PANEL: CPT

## 2021-07-14 PROCEDURE — 36415 COLL VENOUS BLD VENIPUNCTURE: CPT

## 2021-07-14 PROCEDURE — 87636 SARSCOV2 & INF A&B AMP PRB: CPT

## 2021-07-14 PROCEDURE — 87186 SC STD MICRODIL/AGAR DIL: CPT

## 2021-07-14 PROCEDURE — 84145 PROCALCITONIN (PCT): CPT

## 2021-07-14 PROCEDURE — 83735 ASSAY OF MAGNESIUM: CPT

## 2021-07-14 PROCEDURE — 85025 COMPLETE CBC W/AUTO DIFF WBC: CPT

## 2021-07-14 PROCEDURE — 71045 X-RAY EXAM CHEST 1 VIEW: CPT

## 2021-07-14 PROCEDURE — 82947 ASSAY GLUCOSE BLOOD QUANT: CPT

## 2021-07-14 NOTE — ED COUGH/URI
General


Chief Complaint:  Cough/Cold/Flu Symptoms


Stated Complaint:  WEAKNESS,SOB


Nursing Triage Note:  


PT PRESENTS TO THE ED C/O EXERTIONAL SOB AND MILD NON PRODUCTIVE COUGH THAT 


ONSET SUNDAY. PT IS FULLY VACCINATED AGAINST COVID. REPORTS GENERALIZED MALAISE,




STATES FAMILY HAS BEEN USING AZO OTC MEDS TO HOME TXT A SUSPECTED UTI. PT DENIES




CHEST PAIN, VERBALIZES COPD HX


Source:  patient


Exam Limitations:  no limitations





History of Present Illness


Date Seen by Provider:  Jul 14, 2021


Time Seen by Provider:  12:00


Initial Comments


To ER with reports of shortness of breath with exertion and a cough worse than 

usual for the past 2 to 3 days.  She has had both Covid vaccinations.  She also 

has general malaise.  She does have known OPD.  She is been using Azo at home 

for a suspected urinary tract infection.  She does not wear oxygen at home nor 

does she smoke.  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 

expressive aphasia.


Timing/Duration:  constant


Severity/Quality:  moderate


Associated Symptoms:  cough, shortness of breath





Allergies and Home Medications


Allergies


Coded Allergies:  


     No Known Drug Allergies (Unverified , 11/12/20)





Home Medications


Amlodipine Besylate 5 Mg Tablet, 10 MG PO DAILY


   Prescribed by: ANDREY BAH on 12/3/20 1651


Aspirin 81 Mg Tab.chew, 81 MG PO DAILY


   Prescribed by: ANDREY BAH on 12/3/20 1651


Atorvastatin Calcium 40 Mg Tablet, 40 MG PO DAILY


   Prescribed by: ANDREY BAH on 12/3/20 1651


Budesonide 0.5 Mg/2 Ml Ampul.neb, 0.5 MG IH RTBID


   Prescribed by: ANDREY BAH on 12/3/20 1651


Carvedilol 3.125 Mg Tablet, 3.125 MG PO BID


   Prescribed by: ANDREY BAH on 12/3/20 1651


Cefuroxime Axetil 500 Mg Tablet, 500 MG PO BID


   Prescribed by: SHEA MADDOX on 7/14/21 1327


Cephalexin 500 Mg Tablet, 500 MG PO TID


   Prescribed by: IFEOMA MARTINES on 2/26/21 1530


Clopidogrel Bisulfate 75 Mg Tablet, 75 MG PO DAILY


   Prescribed by: ANDREY BAH on 12/3/20 1651


Famotidine 20 Mg Tablet, 20 MG PO DAILY


   Prescribed by: ANDREY BAH on 12/3/20 1651


Ipratropium/Albuterol Sulfate 3 Ml Ampul.neb, 3 ML IH RTBID


   Prescribed by: ANDREY BAH on 12/3/20 1651


Sennosides/Docusate Sodium 1 Each Tablet, 1 EA PO BID


   Prescribed by: ANDREY BAH on 12/3/20 1651





Patient Home Medication List


Home Medication List Reviewed:  Yes





Review of Systems


Review of Systems


Constitutional:  see HPI


EENTM:  see HPI


Respiratory:  see HPI, cough, short of breath


Cardiovascular:  no symptoms reported


Genitourinary:  no symptoms reported


Musculoskeletal:  no symptoms reported


Skin:  no symptoms reported


Psychiatric/Neurological:  No Symptoms Reported


Hematologic/Lymphatic:  No Symptoms Reported





Past Medical-Social-Family Hx


Patient Social History


Tobacco Use?:  No


Substance use?:  No


Alcohol Use?:  No





Immunizations Up To Date


Tetanus Booster (TDap):  Unknown


Influenza Vaccine Up-to-Date:  Yes; Up-to-Date





Seasonal Allergies


Seasonal Allergies:  No





Past Medical History


Surgeries:  Yes


Cardiac, CABG, Open Heart Surgery


Respiratory:  Yes


COPD


Currently Using CPAP:  No


Currently Using BIPAP:  No


Cardiac:  Yes (STENTS, BYPASS )


Cardiomyopathy, Coronary Artery Disease, High Cholesterol, Hypertension


Neurological:  No


Stroke


Sexually Transmitted Disease:  No


HIV/AIDS:  No


Genitourinary:  No


Gastrointestinal:  No


Musculoskeletal:  No


Arthritis


Endocrine:  No


Diabetes, Non-Insulin dep


HEENT:  No


Hearing Impairment:  Denies


Cancer:  No


Psychosocial:  No


Integumentary:  No


Blood Disorders:  No





Family Medical History





Patient reports no known family medical history.





Physical Exam





Vital Signs - First Documented








 7/14/21





 11:55


 


Temp 36.6


 


Pulse 76


 


Resp 18


 


B/P (MAP) 130/44 (72)


 


Pulse Ox 95


 


O2 Delivery Room Air





Capillary Refill : Less Than 3 Seconds


Height: '"


Weight: lbs. oz. kg; 27.00 BMI


Method:


General Appearance:  WD/WN, no apparent distress


Eyes:  Bilateral Eye Normal Inspection, Bilateral Eye PERRL, Bilateral Eye EOMI


HEENT:  PERRL/EOMI, normal ENT inspection


Neck:  full range of motion


Respiratory:  no respiratory distress, no accessory muscle use, decreased breath

sounds, rhonchi; No wheezing; other (94% room air without accessory muscle use)


Gastrointestinal:  normal bowel sounds, non tender, soft


Extremities:  normal range of motion


Neurologic/Psychiatric:  alert, normal mood/affect, oriented x 3


Skin:  normal color, warm/dry





Progress/Results/Core Measures


Suspected Sepsis


SIRS


Temperature: 


Pulse: 76 


Respiratory Rate: 18


 


Laboratory Tests


7/14/21 11:44: White Blood Count 9.8


Blood Pressure 130 /44 


Mean: 72


 


Laboratory Tests


7/14/21 11:44: 


Creatinine 0.81, Platelet Count 337, Total Bilirubin 0.6








Results/Orders


Lab Results





Laboratory Tests








Test


 7/14/21


11:40 7/14/21


11:44 7/14/21


11:54 7/14/21


12:52 Range/Units


 


 


Glucometer 166 H      MG/DL


 


White Blood Count


 


 9.8 


 


 


 4.3-11.0


10^3/uL


 


Red Blood Count


 


 5.06 


 


 


 3.80-5.11


10^6/uL


 


Hemoglobin  12.9    11.5-16.0  g/dL


 


Hematocrit  40    35-52  %


 


Mean Corpuscular Volume  80    80-99  fL


 


Mean Corpuscular Hemoglobin  26    25-34  pg


 


Mean Corpuscular Hemoglobin


Concent 


 32 


 


 


 32-36  g/dL





 


Red Cell Distribution Width  19.1 H   10.0-14.5  %


 


Platelet Count


 


 337 


 


 


 130-400


10^3/uL


 


Mean Platelet Volume  10.0    9.0-12.2  fL


 


Immature Granulocyte % (Auto)  0     %


 


Neutrophils (%) (Auto)  70    42-75  %


 


Lymphocytes (%) (Auto)  15    12-44  %


 


Monocytes (%) (Auto)  14 H   0-12  %


 


Eosinophils (%) (Auto)  0    0-10  %


 


Basophils (%) (Auto)  0    0-10  %


 


Neutrophils # (Auto)


 


 6.9 


 


 


 1.8-7.8


10^3/uL


 


Lymphocytes # (Auto)


 


 1.5 


 


 


 1.0-4.0


10^3/uL


 


Monocytes # (Auto)


 


 1.4 H


 


 


 0.0-1.0


10^3/uL


 


Eosinophils # (Auto)


 


 0.0 


 


 


 0.0-0.3


10^3/uL


 


Basophils # (Auto)


 


 0.0 


 


 


 0.0-0.1


10^3/uL


 


Immature Granulocyte # (Auto)


 


 0.0 


 


 


 0.0-0.1


10^3/uL


 


Sodium Level  132 L   135-145  MMOL/L


 


Potassium Level  5.3 H   3.6-5.0  MMOL/L


 


Chloride Level  97 L     MMOL/L


 


Carbon Dioxide Level  19 L   21-32  MMOL/L


 


Anion Gap  16 H   5-14  MMOL/L


 


Blood Urea Nitrogen  12    7-18  MG/DL


 


Creatinine


 


 0.81 


 


 


 0.60-1.30


MG/DL


 


Estimat Glomerular Filtration


Rate 


 > 60 


 


 


  





 


BUN/Creatinine Ratio  15     


 


Glucose Level  152 H     MG/DL


 


Calcium Level  9.3    8.5-10.1  MG/DL


 


Corrected Calcium  9.4    8.5-10.1  MG/DL


 


Magnesium Level  2.0    1.6-2.4  MG/DL


 


Total Bilirubin  0.6    0.1-1.0  MG/DL


 


Aspartate Amino Transf


(AST/SGOT) 


 33 


 


 


 5-34  U/L





 


Alanine Aminotransferase


(ALT/SGPT) 


 15 


 


 


 0-55  U/L





 


Alkaline Phosphatase  108      U/L


 


B-Type Natriuretic Peptide  32.5    <100.0  PG/ML


 


Total Protein  8.7 H   6.4-8.2  GM/DL


 


Albumin  3.9    3.2-4.5  GM/DL


 


Procalcitonin  0.07    <0.10  NG/ML


 


Influenza Type A (RT-PCR)   Not Detected   Not Detecte  


 


Influenza Type B (RT-PCR)   Not Detected   Not Detecte  


 


SARS-CoV-2 RNA (RT-PCR)   Not Detected   Not Detecte  


 


Urine Color    ORANGE   


 


Urine Clarity    SL CLOUDY   


 


Urine pH    6.0  5-9  


 


Urine Specific Gravity    1.025 H 1.016-1.022  


 


Urine Protein    2+ H NEGATIVE  


 


Urine Glucose (UA)    NEGATIVE  NEGATIVE  


 


Urine Ketones    TRACE H NEGATIVE  


 


Urine Nitrite    POSITIVE H NEGATIVE  


 


Urine Bilirubin    NEGATIVE  NEGATIVE  


 


Urine Urobilinogen    1.0  < = 1.0  MG/DL


 


Urine Leukocyte Esterase    2+ H NEGATIVE  


 


Urine RBC (Auto)    1+ H NEGATIVE  


 


Urine RBC    2-5 H  /HPF


 


Urine WBC    TNTC H  /HPF


 


Urine Squamous Epithelial


Cells 


 


 


 10-25 H


  /HPF





 


Urine Renal Epithelial Cells    2-5   /HPF


 


Urine Crystals    NONE   /LPF


 


Urine Bacteria    LARGE H  /HPF


 


Urine Casts    NONE   /LPF


 


Urine Mucus    NEGATIVE   /LPF


 


Urine Culture Indicated    YES   








My Orders





Orders - SHEA MADDOX


Covid 19 Inhouse Test (7/14/21 11:45)


Influenza A And B By Pcr (7/14/21 11:45)


Chest 1 View, Ap/Pa Only (7/14/21 11:45)


Cbc With Automated Diff (7/14/21 11:45)


Comprehensive Metabolic Panel (7/14/21 11:45)


BNP (7/14/21 11:45)


Magnesium (7/14/21 11:45)


Ua Culture If Indicated (7/14/21 11:45)


Ed Iv/Invasive Line Start (7/14/21 11:45)


Procalcitonin (Pct) (7/14/21 11:57)


Ns Iv 500 Ml (Sodium Chloride 0.9%) (7/14/21 13:00)


Urine Culture (7/14/21 12:52)


Ceftriaxone (Rocephin) (7/14/21 13:30)





Vital Signs/I&O











 7/14/21 7/14/21





 11:55 11:57


 


Temp 36.6 


 


Pulse 76 


 


Resp 18 


 


B/P (MAP) 130/44 (72) 


 


Pulse Ox 95 


 


O2 Delivery Room Air Room Air





Capillary Refill : Less Than 3 Seconds








Blood Pressure Mean:                    72











Departure


Impression





   Primary Impression:  


   UTI (urinary tract infection)


   Additional Impression:  


   COPD (chronic obstructive pulmonary disease)


Disposition:  01 HOME, SELF-CARE


Condition:  Stable





Departure-Patient Inst.


Decision time for Depature:  13:26


Referrals:  


ANDREY BAH DO (PCP/Family)


Primary Care Physician


Patient Instructions:  Urinary Tract Infection, Adult (DC)


Scripts


Cefuroxime Axetil (Cefuroxime) 500 Mg Tablet


500 MG PO BID, #10 TAB


   .


   Prov: SHEA MADDOX         7/14/21











SHEA MADDOX             Jul 14, 2021 12:48

## 2021-07-14 NOTE — DIAGNOSTIC IMAGING REPORT
INDICATION: Chest pain



Portable chest 12:59 PM



There are postoperative changes from median sternotomy. There is

some atelectasis at left lung base. Heart size and pulmonary

vascularity are normal. Lungs are clear.



IMPRESSION: Postsurgical changes. Left basilar atelectasis.



Dictated by: 



  Dictated on workstation # [id number]

## 2021-07-29 ENCOUNTER — HOSPITAL ENCOUNTER (OUTPATIENT)
Dept: HOSPITAL 75 - REHAB | Age: 76
LOS: 4 days | Discharge: HOME | End: 2021-08-02
Attending: INTERNAL MEDICINE
Payer: MEDICARE

## 2021-07-29 DIAGNOSIS — R26.89: Primary | ICD-10-CM

## 2021-07-29 DIAGNOSIS — Z86.73: ICD-10-CM

## 2021-08-30 ENCOUNTER — HOSPITAL ENCOUNTER (OUTPATIENT)
Dept: HOSPITAL 75 - ER | Age: 76
Setting detail: OBSERVATION
LOS: 1 days | Discharge: HOME | End: 2021-08-31
Attending: INTERNAL MEDICINE | Admitting: INTERNAL MEDICINE
Payer: MEDICARE

## 2021-08-30 VITALS — HEIGHT: 59.06 IN | WEIGHT: 127.43 LBS | BODY MASS INDEX: 25.69 KG/M2

## 2021-08-30 VITALS — DIASTOLIC BLOOD PRESSURE: 66 MMHG | SYSTOLIC BLOOD PRESSURE: 157 MMHG

## 2021-08-30 VITALS — DIASTOLIC BLOOD PRESSURE: 82 MMHG | SYSTOLIC BLOOD PRESSURE: 175 MMHG

## 2021-08-30 DIAGNOSIS — J44.9: ICD-10-CM

## 2021-08-30 DIAGNOSIS — I10: ICD-10-CM

## 2021-08-30 DIAGNOSIS — Z79.02: ICD-10-CM

## 2021-08-30 DIAGNOSIS — M19.90: ICD-10-CM

## 2021-08-30 DIAGNOSIS — R13.10: ICD-10-CM

## 2021-08-30 DIAGNOSIS — Z79.82: ICD-10-CM

## 2021-08-30 DIAGNOSIS — Z87.891: ICD-10-CM

## 2021-08-30 DIAGNOSIS — R47.01: ICD-10-CM

## 2021-08-30 DIAGNOSIS — I25.10: ICD-10-CM

## 2021-08-30 DIAGNOSIS — I42.9: ICD-10-CM

## 2021-08-30 DIAGNOSIS — I25.2: ICD-10-CM

## 2021-08-30 DIAGNOSIS — E11.9: ICD-10-CM

## 2021-08-30 DIAGNOSIS — E78.00: ICD-10-CM

## 2021-08-30 DIAGNOSIS — G93.40: ICD-10-CM

## 2021-08-30 DIAGNOSIS — Z95.0: ICD-10-CM

## 2021-08-30 DIAGNOSIS — I21.9: ICD-10-CM

## 2021-08-30 DIAGNOSIS — Z79.899: ICD-10-CM

## 2021-08-30 DIAGNOSIS — N39.0: Primary | ICD-10-CM

## 2021-08-30 DIAGNOSIS — Z86.73: ICD-10-CM

## 2021-08-30 DIAGNOSIS — E78.5: ICD-10-CM

## 2021-08-30 LAB
ALBUMIN SERPL-MCNC: 4.1 GM/DL (ref 3.2–4.5)
ALP SERPL-CCNC: 94 U/L (ref 40–136)
ALT SERPL-CCNC: 17 U/L (ref 0–55)
APTT BLD: 36 SEC (ref 24–35)
APTT PPP: YELLOW S
BACTERIA #/AREA URNS HPF: (no result) /HPF
BASOPHILS # BLD AUTO: 0 10^3/UL (ref 0–0.1)
BASOPHILS NFR BLD AUTO: 0 % (ref 0–10)
BILIRUB SERPL-MCNC: 0.3 MG/DL (ref 0.1–1)
BILIRUB UR QL STRIP: NEGATIVE
BUN/CREAT SERPL: 14
CALCIUM SERPL-MCNC: 9.8 MG/DL (ref 8.5–10.1)
CHLORIDE SERPL-SCNC: 103 MMOL/L (ref 98–107)
CO2 SERPL-SCNC: 24 MMOL/L (ref 21–32)
CREAT SERPL-MCNC: 0.71 MG/DL (ref 0.6–1.3)
D DIMER PPP FEU-MCNC: 0.57 UG/ML (ref 0–0.49)
EOSINOPHIL # BLD AUTO: 0 10^3/UL (ref 0–0.3)
EOSINOPHIL NFR BLD AUTO: 0 % (ref 0–10)
FIBRINOGEN PPP-MCNC: CLEAR MG/DL
GFR SERPLBLD BASED ON 1.73 SQ M-ARVRAT: 80 ML/MIN
GLUCOSE SERPL-MCNC: 243 MG/DL (ref 70–105)
GLUCOSE UR STRIP-MCNC: (no result) MG/DL
HCT VFR BLD CALC: 41 % (ref 35–52)
HGB BLD-MCNC: 13 G/DL (ref 11.5–16)
INR PPP: 1 (ref 0.8–1.4)
KETONES UR QL STRIP: NEGATIVE
LEUKOCYTE ESTERASE UR QL STRIP: (no result)
LYMPHOCYTES # BLD AUTO: 1.8 10^3/UL (ref 1–4)
LYMPHOCYTES NFR BLD AUTO: 21 % (ref 12–44)
MANUAL DIFFERENTIAL PERFORMED BLD QL: NO
MCH RBC QN AUTO: 27 PG (ref 25–34)
MCHC RBC AUTO-ENTMCNC: 32 G/DL (ref 32–36)
MCV RBC AUTO: 86 FL (ref 80–99)
MONOCYTES # BLD AUTO: 0.6 10^3/UL (ref 0–1)
MONOCYTES NFR BLD AUTO: 7 % (ref 0–12)
NEUTROPHILS # BLD AUTO: 5.9 10^3/UL (ref 1.8–7.8)
NEUTROPHILS NFR BLD AUTO: 71 % (ref 42–75)
NITRITE UR QL STRIP: NEGATIVE
PH UR STRIP: 7 [PH] (ref 5–9)
PLATELET # BLD: 403 10^3/UL (ref 130–400)
PMV BLD AUTO: 9.8 FL (ref 9–12.2)
POTASSIUM SERPL-SCNC: 4 MMOL/L (ref 3.6–5)
PROT SERPL-MCNC: 7.8 GM/DL (ref 6.4–8.2)
PROT UR QL STRIP: NEGATIVE
PROTHROMBIN TIME: 13.3 SEC (ref 12.2–14.7)
RBC #/AREA URNS HPF: (no result) /HPF
SODIUM SERPL-SCNC: 139 MMOL/L (ref 135–145)
SP GR UR STRIP: <=1.005 (ref 1.02–1.02)
SQUAMOUS #/AREA URNS HPF: (no result) /HPF
WBC # BLD AUTO: 8.3 10^3/UL (ref 4.3–11)

## 2021-08-30 PROCEDURE — 85025 COMPLETE CBC W/AUTO DIFF WBC: CPT

## 2021-08-30 PROCEDURE — 70450 CT HEAD/BRAIN W/O DYE: CPT

## 2021-08-30 PROCEDURE — 85379 FIBRIN DEGRADATION QUANT: CPT

## 2021-08-30 PROCEDURE — 80061 LIPID PANEL: CPT

## 2021-08-30 PROCEDURE — 85730 THROMBOPLASTIN TIME PARTIAL: CPT

## 2021-08-30 PROCEDURE — 93306 TTE W/DOPPLER COMPLETE: CPT

## 2021-08-30 PROCEDURE — 36415 COLL VENOUS BLD VENIPUNCTURE: CPT

## 2021-08-30 PROCEDURE — 93005 ELECTROCARDIOGRAM TRACING: CPT

## 2021-08-30 PROCEDURE — 70551 MRI BRAIN STEM W/O DYE: CPT

## 2021-08-30 PROCEDURE — 81000 URINALYSIS NONAUTO W/SCOPE: CPT

## 2021-08-30 PROCEDURE — 84484 ASSAY OF TROPONIN QUANT: CPT

## 2021-08-30 PROCEDURE — 80053 COMPREHEN METABOLIC PANEL: CPT

## 2021-08-30 PROCEDURE — 96374 THER/PROPH/DIAG INJ IV PUSH: CPT

## 2021-08-30 PROCEDURE — 99284 EMERGENCY DEPT VISIT MOD MDM: CPT

## 2021-08-30 PROCEDURE — 71045 X-RAY EXAM CHEST 1 VIEW: CPT

## 2021-08-30 PROCEDURE — 70496 CT ANGIOGRAPHY HEAD: CPT

## 2021-08-30 PROCEDURE — 85610 PROTHROMBIN TIME: CPT

## 2021-08-30 PROCEDURE — 70498 CT ANGIOGRAPHY NECK: CPT

## 2021-08-30 PROCEDURE — 97162 PT EVAL MOD COMPLEX 30 MIN: CPT

## 2021-08-30 PROCEDURE — 87088 URINE BACTERIA CULTURE: CPT

## 2021-08-30 PROCEDURE — 93041 RHYTHM ECG TRACING: CPT

## 2021-08-30 RX ADMIN — SODIUM CHLORIDE, SODIUM LACTATE, POTASSIUM CHLORIDE, AND CALCIUM CHLORIDE SCH MLS/HR: 600; 310; 30; 20 INJECTION, SOLUTION INTRAVENOUS at 17:11

## 2021-08-30 NOTE — CONSULTATION-CARDIOLOGY
HPI-Cardiology


Cardiology Consultation


Date of Consultation


8/30/21


Date of Admission





Time Seen by Provider:  16:40


Indication:  Acute CVA





HPI


76-year-old lady with history of CVA, coronary artery disease, hypertension 

hyperlipidemia, admitted through the emergency room for increasing lethargy, 

dysarthria, numbness and tingling in her left side of the face and left arm.  

Was admitted for observation, was starting to improve in the emergency room and 

currently she is back to her baseline.  No chest pain.  No palpitation.  No 

syncope or near syncopal episodes





Home Medications & Allergies


Allergies:  


Coded Allergies:  


     No Known Drug Allergies (Unverified , 11/12/20)


Home Medication List Reviewed:  Yes





PMH-Social-Family Hx


Patient Social History


Marital Status:  


Employed/Student:  retired


Smoking Status:  Former Smoker (1.2-2ppd x 50 years)


Type Used:  Cigarettes


Recent Hopitalizations:  No (CVA AND BYPASS SURGERY)


Have you traveled recently?:  No


Alcohol Use?:  No





Immunizations Up To Date


Tetanus Booster (TDap):  Unknown


Date of Influenza Vaccine:  Nov 10, 2020





Past Medical History


Discussed below





Family Medical History


Significant Family History:  Heart Disease, Diabetes, Hypertension


Family Medical Hx


Noncontributory


Family History:  


Patient reports no known family medical history.





Review of Systems-General


Review of Systems


Constitutional:  see HPI; No chills, No diaphoresis, No dizziness, No malaise


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 

CVA); No hearing loss, No blurred vision, No double vision


Respiratory:  no symptoms reported, see HPI; No cough, No dyspnea on exertion, 

No hemoptysis, No short of breath


Cardiovascular:  No chest pain, No edema, No palpitations


Gastrointestinal:  no symptoms reported, see HPI; No abdominal pain, No 

constipation, No diarrhea, No melena, No nausea, No vomiting


Genitourinary:  No decreased output, No discharge, No dysuria, No frequency, No 

hematuria, No incontinence


Pregnant:  No


Musculoskeletal:  see HPI; No back pain, No joint pain


Skin:  see HPI; No change in color, No change in hair/nails, No lesions, No rash


Psychiatric/Neurological:  See HPI; Denies Anxiety, Denies Depressed; Numbness 

(L face and left fingers), Pre-Existing Deficit (Residual weakness right lower 

extrem s/p old CVA)





All Other Systems Reviewed


Negative Unless Noted:  Yes





Reviewed Test Results


Reviewed Test Results


Lab





Laboratory Tests








Test


 8/30/21


11:15 8/30/21


12:27 8/30/21


16:13 Range/Units


 


 


White Blood Count


 8.3 


 


 


 4.3-11.0


10^3/uL


 


Red Blood Count


 4.75 


 


 


 3.80-5.11


10^6/uL


 


Hemoglobin 13.0    11.5-16.0  g/dL


 


Hematocrit 41    35-52  %


 


Mean Corpuscular Volume 86    80-99  fL


 


Mean Corpuscular Hemoglobin 27    25-34  pg


 


Mean Corpuscular Hemoglobin


Concent 32 


 


 


 32-36  g/dL





 


Red Cell Distribution Width 19.6 H   10.0-14.5  %


 


Platelet Count


 403 H


 


 


 130-400


10^3/uL


 


Mean Platelet Volume 9.8    9.0-12.2  fL


 


Immature Granulocyte % (Auto) 0     %


 


Neutrophils (%) (Auto) 71    42-75  %


 


Lymphocytes (%) (Auto) 21    12-44  %


 


Monocytes (%) (Auto) 7    0-12  %


 


Eosinophils (%) (Auto) 0    0-10  %


 


Basophils (%) (Auto) 0    0-10  %


 


Neutrophils # (Auto)


 5.9 


 


 


 1.8-7.8


10^3/uL


 


Lymphocytes # (Auto)


 1.8 


 


 


 1.0-4.0


10^3/uL


 


Monocytes # (Auto)


 0.6 


 


 


 0.0-1.0


10^3/uL


 


Eosinophils # (Auto)


 0.0 


 


 


 0.0-0.3


10^3/uL


 


Basophils # (Auto)


 0.0 


 


 


 0.0-0.1


10^3/uL


 


Immature Granulocyte # (Auto)


 0.0 


 


 


 0.0-0.1


10^3/uL


 


Sodium Level 139    135-145  MMOL/L


 


Potassium Level 4.0    3.6-5.0  MMOL/L


 


Chloride Level 103      MMOL/L


 


Carbon Dioxide Level 24    21-32  MMOL/L


 


Anion Gap 12    5-14  MMOL/L


 


Blood Urea Nitrogen 10    7-18  MG/DL


 


Creatinine


 0.71 


 


 


 0.60-1.30


MG/DL


 


Estimat Glomerular Filtration


Rate 80 


 


 


  





 


BUN/Creatinine Ratio 14     


 


Glucose Level 243 H     MG/DL


 


Calcium Level 9.8    8.5-10.1  MG/DL


 


Corrected Calcium 9.7    8.5-10.1  MG/DL


 


Total Bilirubin 0.3    0.1-1.0  MG/DL


 


Aspartate Amino Transf


(AST/SGOT) 14 


 


 


 5-34  U/L





 


Alanine Aminotransferase


(ALT/SGPT) 17 


 


 


 0-55  U/L





 


Alkaline Phosphatase 94      U/L


 


Troponin I < 0.028    <0.028  NG/ML


 


Total Protein 7.8    6.4-8.2  GM/DL


 


Albumin 4.1    3.2-4.5  GM/DL


 


Urine Color  YELLOW    


 


Urine Clarity  CLEAR    


 


Urine pH  7.0   5-9  


 


Urine Specific Gravity  <=1.005   1.016-1.022  


 


Urine Protein  NEGATIVE   NEGATIVE  


 


Urine Glucose (UA)  2+ H  NEGATIVE  


 


Urine Ketones  NEGATIVE   NEGATIVE  


 


Urine Nitrite  NEGATIVE   NEGATIVE  


 


Urine Bilirubin  NEGATIVE   NEGATIVE  


 


Urine Urobilinogen  0.2   < = 1.0  MG/DL


 


Urine Leukocyte Esterase  TRACE H  NEGATIVE  


 


Urine RBC (Auto)  NEGATIVE   NEGATIVE  


 


Urine RBC  NONE    /HPF


 


Urine WBC  10-25 H   /HPF


 


Urine Squamous Epithelial


Cells 


 5-10 


 


  /HPF





 


Urine Crystals  NONE    /LPF


 


Urine Bacteria  TRACE    /HPF


 


Urine Casts  NONE    /LPF


 


Urine Mucus  NEGATIVE    /LPF


 


Urine Culture Indicated  YES    











Physical Exam


Physical Exam


Vital Signs





Vital Signs - First Documented








 8/30/21





 11:21


 


Temp 36.5


 


Pulse 68


 


Resp 17


 


B/P (MAP) 132/61 (84)


 


O2 Delivery Room Air





Capillary Refill : Less Than 3 Seconds


Height, Weight, BMI


Height: '"


Weight: lbs. oz. kg; 26.00 BMI


Method:


General Appearance:  No Apparent Distress, WD/WN


Eyes:  Bilateral Eye Normal Inspection, Bilateral Eye PERRL, Bilateral Eye EOMI


HEENT:  PERRL/EOMI, Pharynx Normal, Moist Mucous Membranes


Neck:  Full Range of Motion, Non Tender, Supple


Respiratory:  Chest Non Tender, Lungs Clear, Normal Breath Sounds, No Accessory 

Muscle Use


Cardiovascular:  Regular Rate, Rhythm, No Murmur, Normal Peripheral Pulses


Gastrointestinal:  Normal Bowel Sounds, Non Tender, Soft


Rectal:  Deferred


Back:  Normal Inspection, No CVA Tenderness, No Vertebral Tenderness


Extremity:  Normal Capillary Refill, Non Tender, No Calf Tenderness, No Pedal 

Edema


Neurologic/Psychiatric:  Alert, Oriented x3, Normal Mood/Affect, Other (4/5 

flexion R hip strength)


Skin:  Normal Color, Warm/Dry


Lymphatic:  No Adenopathy





A/P-Cardiology


Admission Diagnosis


TIA


Coronary artery disease


Hypertension


Hyperlipidemia





Assessment/Plan


TIA, had dysarthria and numbness in her left side, returned to baseline and 

resolved, had history of CVA with some residual right hemiparesis.  Still had on

her baseline slight slurred speech and muscle strength is about 2-3 over 5 on 

the right side and 4/5 on the left side.  She has been maintained on aspirin and

Plavix which I recommend continuing.





Coronary artery disease, history of CABG x3 done in 2020 at [facility] 

[place], clinically stable at this time.  Continue to monitor





Hypertension, restart home medication monitor blood pressure





Hyperlipidemia, monitor lipids





History of peripheral arterial disease, had ischemic event after her bypass that

improved with conservative management.





Clinical Quality Measures


Stroke:


Date of last known well:  Aug 30, 2021


Time of last known well:  08:15











BROOKE SHEA MD              Aug 30, 2021 16:43

## 2021-08-30 NOTE — ED NEUROLOGICAL PROBLEM
General


Chief Complaint:  Neuro-Stroke Like Symptoms


Stated Complaint:  SLURRED SPEECH


Source:  patient


Exam Limitations:  no limitations





History of Present Illness


Date Seen by Provider:  Aug 30, 2021


Time Seen by Provider:  11:23


Initial Comments


To ER by [facility] from [facility].  Complains of strokelike symptoms 

including trouble speaking noticed her speech therapist morning at around 0815. 

She has a history of CVA involving the left occipital artery and 2020. 

Deficits at that time included right-sided weakness, dysarthria and expressive 

aphasia.  She is on aspirin and Plavix.


Timing/Duration:  4-6 hours


Severity:  moderate


Associated Symptoms:  slurred speech





Allergies and Home Medications


Allergies


Coded Allergies:  


     No Known Drug Allergies (Unverified , 20)





Home Medications


Amlodipine Besylate 5 Mg Tablet, 10 MG PO DAILY


   Prescribed by: ANDREY BAH on 12/3/20 1651


   Last Action: Continued


Aspirin 81 Mg Tab.chew, 81 MG PO DAILY


   Prescribed by: ANDREY BAH on 12/3/20 1651


Atorvastatin Calcium 40 Mg Tablet, 40 MG PO DAILY


   Prescribed by: ANDREY BAH on 12/3/20 1651


   Last Action: Continued


Budesonide 0.5 Mg/2 Ml Ampul.neb, 0.5 MG IH RTBID


   Prescribed by: ANDREY BAH on 12/3/20 1651


Carvedilol 3.125 Mg Tablet, 3.125 MG PO BID


   Prescribed by: ANDREY BAH on 12/3/20 1651


   Last Action: Continued


Cefuroxime Axetil 500 Mg Tablet, 500 MG PO BID


   . 


   Prescribed by: SHEA MADDOX on 21 1332


Cephalexin 500 Mg Tablet, 500 MG PO TID


   Prescribed by: IFEOMA MARTINES on 21 1530


Clopidogrel Bisulfate 75 Mg Tablet, 75 MG PO DAILY


   Prescribed by: ANDREY BAH on 12/3/20 1651


Famotidine 20 Mg Tablet, 20 MG PO DAILY


   Prescribed by: ANDREY BAH on 12/3/20 1651


   Last Action: Continued


Ipratropium/Albuterol Sulfate 3 Ml Ampul.neb, 3 ML IH RTBID


   Prescribed by: ANDREY BAH on 12/3/20 1651


Sennosides/Docusate Sodium 1 Each Tablet, 1 EA PO BID


   Prescribed by: ANDREY BAH on 12/3/20 1651





Patient Home Medication List


Home Medication List Reviewed:  Yes





Review of Systems


Review of Systems


Constitutional:  see HPI


Eyes:  No Symptoms Reported


Ears, Nose, Mouth, Throat:  no symptoms reported


Respiratory:  no symptoms reported


Cardiovascular:  no symptoms reported


Genitourinary:  no symptoms reported


Musculoskeletal:  no symptoms reported


Skin:  no symptoms reported


Psychiatric/Neurological:  See HPI


Endocrine:  No Symptoms Reported


Hematologic/Lymphatic:  No Symptoms Reported





Past Medical-Social-Family Hx


Immunizations Up To Date


Tetanus Booster (TDap):  Unknown





Seasonal Allergies


Seasonal Allergies:  No





Past Medical History


Surgeries:  Yes


Cardiac, CABG, Open Heart Surgery


Respiratory:  Yes


COPD


Currently Using CPAP:  No


Currently Using BIPAP:  No


Cardiac:  Yes (STENTS, BYPASS )


Cardiomyopathy, Coronary Artery Disease, High Cholesterol, Hypertension


Neurological:  No


Stroke


Sexually Transmitted Disease:  No


HIV/AIDS:  No


Genitourinary:  No


Gastrointestinal:  No


Musculoskeletal:  No


Arthritis


Endocrine:  No


Diabetes, Non-Insulin dep


HEENT:  No


Hearing Impairment:  Denies


Cancer:  No


Psychosocial:  No


Integumentary:  No


Blood Disorders:  No





Family Medical History





Patient reports no known family medical history.





Physical Exam


Vital Signs





Vital Signs - First Documented








 21





 11:21 11:22


 


Temp 36.5 


 


Pulse 68 


 


Resp 17 


 


B/P (MAP) 132/61 (84) 


 


Pulse Ox  94


 


O2 Delivery Room Air 





Capillary Refill :


Height, Weight, BMI


Height: '"


Weight: lbs. oz. kg; 27.00 BMI


Method:


General Appearance:  WD/WN, no apparent distress, other ( hemodynamically stable

alert very pleasant, anxious appearing)


HEENT:  PERRL/EOMI, normal ENT inspection


Neck:  non-tender, full range of motion


Respiratory:  no respiratory distress, no accessory muscle use


Cardiovascular:  regular rate, rhythm, no murmur


Gastrointestinal:  normal bowel sounds, non tender, soft


Neurologic/Psychiatric:  alert, normal mood/affect, oriented x 3


Crainal Nerves:  normal hearing, normal speech, PERRL


Coordination/Gait:  ABN nose to finger (L)


Motor/Sensory:  no motor deficit, no sensory deficit


Skin:  normal color, warm/dry





Stroke


Onset of Symptoms


Date of Onset of Symptoms:  Aug 30, 2021


Time of Symptom Onset:  08:15





NIH Stroke Scale Assessment





   Select: Initial Level of Consciousness: 0=Alert (0), Level of Consciousness-

   Questions: 0=Answers both month/age (0), LOC Commands: 0=Performs both tasks 

   (0), Gaze: Normal (0), Visual Fields: 0=No visual loss (0), Facial Movement 

   (Facial Paresis): 0=Normal symmetrical mnt (0), Motor Function-Arms Right: 

   0=No drift (0), Motor Function-Arms Left: 0=No drift (0), Motor Function-Legs

   Right: 0=No drift (0), Motor Function-Legs Left: 0=No drift (0), Limb Ataxia:

   1=Present in one limb (1), Sensory: 0=Normal:no loss (0), Best Language: 

   1=Mild to moderat aphasia (1), Dysarthria: 1=Mild to moderate loss (1), 

   Extinction & Inattention: 0=No abnormality (0), Total: 3





Stroke Thrombolytic Exclusion


Age 18 or Over:  Yes


Acute intenal hemorrhage:  No


History of CVA:  No


Uncontrolled Coagulation Defec:  No


Intracranial Hemorrhage:  No


Severe Hypertension:  No


GI or  Bleed:  No


Subarachnoid Hemorrhage:  No


Intracranial Neoplasm/Aneurysm:  No


Oral Anticoagulants:  No


Surgery or Trauma:  No


Puncture of Non-Compressible V:  No


Recent CPR:  No


Diabetic Hemorrhagic Retinopat:  No


Organ Biopsy:  No


Recent Obstetric Delivery:  No


Glucose:  No


Significant Hepatic Dysfunctio:  No


NIH Stoke Scale >22:  No


Bacterial Endocarditis:  No


Pericarditis:  No


Improving Symptoms:  No


Platelets:  No


TPA Contraindication:  No





Progress/Results/Core Measures


Results/Orders


Lab Results





Laboratory Tests








Test


 21


11:15 21


12:27 Range/Units


 


 


White Blood Count


 8.3 


 


 4.3-11.0


10^3/uL


 


Red Blood Count


 4.75 


 


 3.80-5.11


10^6/uL


 


Hemoglobin 13.0   11.5-16.0  g/dL


 


Hematocrit 41   35-52  %


 


Mean Corpuscular Volume 86   80-99  fL


 


Mean Corpuscular Hemoglobin 27   25-34  pg


 


Mean Corpuscular Hemoglobin


Concent 32 


 


 32-36  g/dL





 


Red Cell Distribution Width 19.6 H  10.0-14.5  %


 


Platelet Count


 403 H


 


 130-400


10^3/uL


 


Mean Platelet Volume 9.8   9.0-12.2  fL


 


Immature Granulocyte % (Auto) 0    %


 


Neutrophils (%) (Auto) 71   42-75  %


 


Lymphocytes (%) (Auto) 21   12-44  %


 


Monocytes (%) (Auto) 7   0-12  %


 


Eosinophils (%) (Auto) 0   0-10  %


 


Basophils (%) (Auto) 0   0-10  %


 


Neutrophils # (Auto)


 5.9 


 


 1.8-7.8


10^3/uL


 


Lymphocytes # (Auto)


 1.8 


 


 1.0-4.0


10^3/uL


 


Monocytes # (Auto)


 0.6 


 


 0.0-1.0


10^3/uL


 


Eosinophils # (Auto)


 0.0 


 


 0.0-0.3


10^3/uL


 


Basophils # (Auto)


 0.0 


 


 0.0-0.1


10^3/uL


 


Immature Granulocyte # (Auto)


 0.0 


 


 0.0-0.1


10^3/uL


 


Sodium Level 139   135-145  MMOL/L


 


Potassium Level 4.0   3.6-5.0  MMOL/L


 


Chloride Level 103     MMOL/L


 


Carbon Dioxide Level 24   21-32  MMOL/L


 


Anion Gap 12   5-14  MMOL/L


 


Blood Urea Nitrogen 10   7-18  MG/DL


 


Creatinine


 0.71 


 


 0.60-1.30


MG/DL


 


Estimat Glomerular Filtration


Rate 80 


 


  





 


BUN/Creatinine Ratio 14    


 


Glucose Level 243 H    MG/DL


 


Calcium Level 9.8   8.5-10.1  MG/DL


 


Corrected Calcium 9.7   8.5-10.1  MG/DL


 


Total Bilirubin 0.3   0.1-1.0  MG/DL


 


Aspartate Amino Transf


(AST/SGOT) 14 


 


 5-34  U/L





 


Alanine Aminotransferase


(ALT/SGPT) 17 


 


 0-55  U/L





 


Alkaline Phosphatase 94     U/L


 


Troponin I < 0.028   <0.028  NG/ML


 


Total Protein 7.8   6.4-8.2  GM/DL


 


Albumin 4.1   3.2-4.5  GM/DL


 


Urine Color  YELLOW   


 


Urine Clarity  CLEAR   


 


Urine pH  7.0  5-9  


 


Urine Specific Gravity  <=1.005  1.016-1.022  


 


Urine Protein  NEGATIVE  NEGATIVE  


 


Urine Glucose (UA)  2+ H NEGATIVE  


 


Urine Ketones  NEGATIVE  NEGATIVE  


 


Urine Nitrite  NEGATIVE  NEGATIVE  


 


Urine Bilirubin  NEGATIVE  NEGATIVE  


 


Urine Urobilinogen  0.2  < = 1.0  MG/DL


 


Urine Leukocyte Esterase  TRACE H NEGATIVE  


 


Urine RBC (Auto)  NEGATIVE  NEGATIVE  


 


Urine RBC  NONE   /HPF


 


Urine WBC  10-25 H  /HPF


 


Urine Squamous Epithelial


Cells 


 5-10 


  /HPF





 


Urine Crystals  NONE   /LPF


 


Urine Bacteria  TRACE   /HPF


 


Urine Casts  NONE   /LPF


 


Urine Mucus  NEGATIVE   /LPF


 


Urine Culture Indicated  YES   








My Orders





Orders - SHEA MADDOX


Cbc With Automated Diff (21 11:20)


Protime With Inr (21 11:20)


Partial Thromboplastin Time (21 11:20)


Comprehensive Metabolic Panel (21 11:20)


Fibrin Degradation Products (21 11:20)


Troponin I (21 11:20)


Ua Culture If Indicated (21 11:20)


Chest 1 View, Ap/Pa Only (21 11:20)


Ekg Tracing (21 11:20)


Accucheck Stat ONCE (21 11:20)


Ed Iv/Invasive Line Start (21 11:20)


Ed Iv/Invasive Line Start (21 11:20)


Vital Signs Stroke Patient Q15M (21 11:20)


Ct Head Wo-R/O Stroke (21 11:20)


O2 (21 11:20)


Intake & Output 06,14,22 (21 11:20)


Monitor-Rhythm Ecg Trace Only (21 11:20)


Dysphagia Screening Tool (21 11:20)


Post Thrombolytic Adminstratio (21 11:20)


Lipid Panel (21 06:00)


Ct Angio Head/Neck (21 11:20)


Iohexol Injection (Omnipaque 350 Mg/Ml 1 (21 11:45)


Received Contrast (Hold Metformin- Contr (21 11:45)


Ns (Ivpb) (Sodium Chloride 0.9% Ivpb Bag (21 11:45)


Sodium Chloride Flush (Catheter Flush Sy (21 11:45)


Mri Brain W/O Contrast (21 12:49)


Urine Culture (21 12:27)


Ceftriaxone (Rocephin) (21 14:45)





Medications Given in ED





Current Medications








 Medications  Dose


 Ordered  Sig/Bj


 Route  Start Time


 Stop Time Status Last Admin


Dose Admin


 


 Iohexol  75 ml  ONCE  ONCE


 IV  21 11:45


 21 11:46 DC 21 11:58


75 ML


 


 Sodium Chloride  10 ml  AS NEEDED  PRN


 IV  21 11:45


 21 16:11 DC 21 11:58


10 ML


 


 Sodium Chloride  100 ml  ONCE  ONCE


 IV  21 11:45


 21 11:46 DC 21 11:58


80 ML








Vital Signs/I&O











 21





 11:21 11:22


 


Temp 36.5 


 


Pulse 68 66


 


Resp 17 18


 


B/P (MAP) 132/61 (84) 175/82


 


Pulse Ox  94


 


O2 Delivery Room Air 











Departure


Communication (Admissions)


Family Conversation


NAME:   TAYLOR CENTENO


[facility] REC#:   [id number]


ACCOUNT#:   [id number]


PT STATUS:   REG ER


:   1945


PHYSICIAN:   SHEA MADDOX


ADMIT DATE:   21/ER


                                   ***Draft***


Date of Exam:21





MRI BRAIN W/O CONTRAST








Clinical indications: Patient with possible stroke, dysarthria,


ataxia, left-sided weakness. Patient has history of old strokes.





Exam: MRI of the brain performed without IV contrast. Sequences


include axial DWI, ADC map, axial gradient echo, axial T2, axial


FLAIR, and axial T1.





Comparison: CT angiogram of head/neck dated 2021.





Findings:


There is a 3 mm focal area of elevated DWI signal with normalized


ADC map signal involving the high posterior left frontal region


near the region of the precentral gyrus. This may represent a


focal area of subacute infarct. There are no other areas of


infarct seen. There is no right cerebral hemisphere abnormality


noted. There is no intracranial hemorrhage, brain herniation or


midline shift.





There is a small to moderate sized chronic cerebral infarct with


cystic encephalomalacia involving the lateral left


frontal/parietal region. There is a moderate-sized chronic


cerebral infarct with cystic encephalomalacia involving the left


occipital lobe and medial posterior left temporal lobe regions.


There is high T1 signal involving the cortical region of the left


occipital lobe and left temporal lobe chronic infarct regions


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 There is no hemosiderin


staining to suggest sequelae of parenchymal hemorrhage. There is


no brain herniation or midline shift. There are other focal and


patchy areas of high T2 signal white matter changes involving


both cerebral hemispheres and periventricular regions, likely


representing chronic small vessel ischemic disease and


leukoaraiosis. Diffuse brain parenchymal volume loss is seen. The


sellar and suprasellar regions are unremarkable.





The extracranial soft tissues, skull, and orbits are


unremarkable. There is moderate mucosal thickening involving


right maxillary sinus with small air-fluid level. There is a


small mucus retention cyst involving left maxillary sinus. There


is moderate consolidation of fluid involving the left sphenoid


sinus region. There is mild ethmoid sinus mucosal thickening.


There is small amount of fluid involving right mastoid air cells.





IMPRESSION:


1: There is a focal area of abnormal signal involving the high


posterior left frontal lobe region which may represent an


subacute infarct.





2: Otherwise is no MRI evidence of acute cerebral infarct,


intracranial hemorrhage, brain herniation or midline shift. 


There is no hydrocephalus.





3: There is chronic cerebral infarct with cystic encephalomalacia


involving the left occipital lobe/medial posterior left temporal


lobe region and lateral left frontal/parietal lobe regions.





4: Age-related brain parenchymal changes including chronic small


vessel ischemic disease and leukoaraiosis.





5: There is paranasal sinus disease. There is small amount of


fluid in the right mastoid air cells.





  Dictated on workstation # [id number]








Dict:   21 1400


Trans:   21 1414


CVB [id number]





Interpreted by:     SUSANNE HITCHCOCK MD


Electronically signed by:


[id number]-from what I can tell, the expressive aphasia and dysarthria was a deficit 

noted during her last CVA.  She scores 3 on the NIH stroke scale for expressive 

aphasia and dysarthria.  The only new deficit that I can find today is 

questionable ataxia with finger-to-nose test left hand.  This is minimal at 

best.  Differential includes recurrent CVA versus other illness causing 

recrudescence of old stroke symptoms.


1433-Tamiko spoken with Dr martinez from  stroke neurology. recommends maintaining 

DAPT, add high intensity statin if not already on it. D/w Dr Bah and will 

admit obs with tx for UTI. 





NAME:   TAYLOR CENTENO CAROLYN


MED REC#:   [id number]


ACCOUNT#:   [id number]


PT STATUS:   REG ER


:   1945


PHYSICIAN:   SHEA MADDOX APRN


ADMIT DATE:   21/ER


                                   ***Draft***


Date of Exam:21





CT ANGIO HEAD/NECK








Clinical indication: Emergency Room non-activation. Left arm


weakness. Evaluate for LVO.





Exams:   


1:  Head CT with and without IV contrast. Auto Exposure Controls


were utilized during the CT exam to meet ALARA standards for


radiation dose reduction.





2:  CT angiogram of the head and neck performed with 100 cc of


Omnipaque 350 IV contrast. Sagittal and coronal MIP reformations


were created for better visualization of vascular anatomy. CT


angiogram was post-processed using RAPID LVO detection to include


quantitative measurements of cerebral blood flow and automated


results notification to the stroke and/or neurointerventional


team.





Comparison: Head CT without contrast dated 2021..





Findings:


Head CT: There is stable appearance of the brain parenchyma with


no abnormal IV contrast enhancement. Again seen moderate sized


chronic cerebral infarct involving the left occipital lobe and


medial posterior left temporal lobe region. Stable small chronic


infarct involving the posterior lateral left frontal


lobe/parietal lobe region. Brain parenchymal volume loss is seen.


There are small areas of low-attenuation involving both cerebral


hemispheres, likely from chronic infarct changes. There is no


evidence of intraparenchymal hemorrhage, brain herniation midline


shift. There is no hydrocephalus. The extracranial soft tissue,


skull, and orbits are unremarkable. There is moderate amount of


peripheral mucosal thickening involving right maxillary sinus.


There is consolidation of the left sphenoid sinus region and mild


ethmoid sinus mucosal thickening. There is a small mucus


retention cyst involving left maxillary sinus.





CT ANGIOGRAM: Three-vessel aortic arch is seen. There is mild


stenosis of the origin of the right subclavian artery due to


atherosclerotic disease. Otherwise bilateral subclavian arteries


are patent. There is motion artifact which obscures portions of


the proximal great vessels. There is significant motion artifact


which may be related to swallowing which obscures the C3 on C4


vertebral body levels in the region of the bilateral carotid


bifurcations. There is an endovascular stent within the distal


right common carotid artery and proximal right cervical ICA which


is patent. There is mild narrowing seen within the stent.





There is tortuosity and mild narrowing with kinking of the mid to


distal cervical right ICA.





There is atherosclerotic disease of the left ICA bulb with motion


artifact significantly obscuring the region and unable to


evaluate this area. There is tortuosity and mild kinking of the


mid cervical left ICA. The remainder of the cervical left ICA is


patent. There is atherosclerotic disease involving the bilateral


cavernous carotid arteries. There is mild to moderate stenosis


involving the left ICA bulb. There is moderate stenosis involving


the paraclinoid left ICA. There is mild narrowing involving the


paraclinoid right ICA. The bilateral ECAs are patent as


visualized.





There is atherosclerotic disease involving the proximal cervical


right vertebral with moderate stenosis extending from the


proximal portion to the C7 level with slightly lens-shaped


appearance.





There is moderate to severe short segment stenosis involving the


right cervical vertebral artery at the C6 level. The remainder of


the cervical right vertebral artery is patent. There is mild to


moderate stenosis involving the intradural right vertebral artery


distally.





There is occlusion of the cervical and left vertebral artery with


some short segment areas of reconstitution proximally with


continuous contrast seen within the cervical left vertebral


artery at the C3 vertebral body level. There are areas of


vascular irregularity and mild to moderate narrowing involving


the distal cervical left vertebral artery. There is moderate


stenosis involving the intradural cervical left vertebral artery


proximally. The left PICA is patent. The post PICA left vertebral


artery is patent.





There is no significant stenosis of the basilar artery. There is


short segment area of moderate to severe stenosis involving the


left P2 PCA region. Normal-appearing caliber left P1 PCA is seen.


There is contrast seen within the distal left P2 and P3 PCA


regions in the area of infarct.





There is a small caliber absent right P1 PCA. There is a fetal


right PCA. The right PCA and distal branches are patent.





The ACAs and distal branches are patent. There is mild narrowing


involving the proximal right M1 MCA. There is a small left M2 MCA


branch which appears to be in the superior segment which extends


into the anterior sylvian fissure region which has diminished


contrast within it. This may be related to proximal stenosis or


clot. The right MCA and distal branches are otherwise patent. The


dural venous sinuses are patent.





The jugular veins are patent as visualized.





Multiple bilateral thyroid nodules are noted.





The neck soft tissue structures show no other significant


abnormality. Emphysematous disease of the visualized upper lungs


are noted. There is cervical spine degenerative disease including


grade 1 anterolisthesis of C4 on C5.





IMPRESSION:


1: Stable CT scan of the brain with no evidence of interval acute


intracranial hemorrhage or cerebral infarct.





2: Again seen chronic infarct involving the left occipital lobe,


medial posterior left temporal lobe and posterior left


frontal/parietal region laterally. MRI of the brain with and


without contrast may help better evaluate for acute finding.





3: There is diminished contrast within a small caliber left M2


MCA vessel which appears to be in the superior segment and


extends in the anterior left sylvian fissure region. This is of


unknown age. Relation to patient's chronic cerebral infarct may


be considered.





4: There is occlusion of the proximal cervical left vertebral


artery with significant stenosis and vascular irregularity


throughout which is described above. This is concerning for


vascular dissection of unknown age. There are also areas of


stenosis of the intradural left vertebral artery.





5: There is a short segment area of moderate to severe stenosis


involving the cervical right vertebral artery at the C6 level and


an area of moderate stenosis involving the proximal cervical


right ICA to the C7 level which may also be related to vascular


dissection of unknown age. The remainder of the cervical right


vertebral artery is patent.





6: There is motion artifact which obscures the bilateral carotid


artery bifurcations with the left side being uninterpretable.


There is an endovascular stent involving the distal right common


carotid artery extending to the proximal cervical right ICA which


is patent.





7: There is a short segment area of moderate to severe stenosis


involving the left P2 PCA, of unknown age. This may be related to


chronic cerebral infarct in the region.





Results of this report was discussed with MARTIN Leroy via


the telephone on 2021 at 1245 hours.





  Dictated on workstation # [id number]








Dict:   21 1214


Trans:   21 1303


[facility] [id number]





Interpreted by:     SUSANNE HITCHCOCK MD


Electronically signed by:





Impression





   Primary Impression:  


   Expressive aphasia


   Additional Impressions:  


   Dysphagia


   UTI (urinary tract infection)


Disposition:   ADMITTED AS INPATIENT


Condition:  Stable





Admissions


Decision to Admit Reason:  Admit from ER (General)


Decision to Admit/Date:  Aug 30, 2021


Time/Decision to Admit Time:  14:34





Departure-Patient Inst.


Referrals:  


ANDREY BAH DO (PCP/Family)


Primary Care Physician











SHEA MADDOX             Aug 30, 2021 11:24

## 2021-08-30 NOTE — DIAGNOSTIC IMAGING REPORT
Clinical indications: Patient with possible stroke, dysarthria,

ataxia, left-sided weakness. Patient has history of old strokes.



Exam: MRI of the brain performed without IV contrast. Sequences

include axial DWI, ADC map, axial gradient echo, axial T2, axial

FLAIR, and axial T1.



Comparison: CT angiogram of head/neck dated 08/30/2021.



Findings:

There is a 3 mm focal area of elevated DWI signal with normalized

ADC map signal involving the high posterior left frontal region

near the region of the precentral gyrus. This may represent a

focal area of subacute infarct. There are no other areas of

infarct seen. There is no right cerebral hemisphere abnormality

noted. There is no intracranial hemorrhage, brain herniation or

midline shift.



There is a small to moderate sized chronic cerebral infarct with

cystic encephalomalacia involving the lateral left

frontal/parietal region. There is a moderate-sized chronic

cerebral infarct with cystic encephalomalacia involving the left

occipital lobe and medial posterior left temporal lobe regions.

There is high T1 signal involving the cortical region of the left

occipital lobe and left temporal lobe chronic infarct regions

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 There is no hemosiderin

staining to suggest sequelae of parenchymal hemorrhage. There is

no brain herniation or midline shift. There are other focal and

patchy areas of high T2 signal white matter changes involving

both cerebral hemispheres and periventricular regions, likely

representing chronic small vessel ischemic disease and

leukoaraiosis. Diffuse brain parenchymal volume loss is seen. The

sellar and suprasellar regions are unremarkable.



The extracranial soft tissues, skull, and orbits are

unremarkable. There is moderate mucosal thickening involving

right maxillary sinus with small air-fluid level. There is a

small mucus retention cyst involving left maxillary sinus. There

is moderate consolidation of fluid involving the left sphenoid

sinus region. There is mild ethmoid sinus mucosal thickening.

There is small amount of fluid involving right mastoid air cells.



IMPRESSION:

1: There is a focal area of abnormal signal involving the high

posterior left frontal lobe region which may represent an

subacute infarct.



2: Otherwise is no MRI evidence of acute cerebral infarct,

intracranial hemorrhage, brain herniation or midline shift. 

There is no hydrocephalus.



3: There is chronic cerebral infarct with cystic encephalomalacia

involving the left occipital lobe/medial posterior left temporal

lobe region and lateral left frontal/parietal lobe regions.



4: Age-related brain parenchymal changes including chronic small

vessel ischemic disease and leukoaraiosis.



5: There is paranasal sinus disease. There is small amount of

fluid in the right mastoid air cells.



Dictated by: 



  Dictated on workstation # [id number]

## 2021-08-30 NOTE — DIAGNOSTIC IMAGING REPORT
Clinical indication: Emergency Room non-activation. Left arm

weakness. Evaluate for LVO.



Exams:   

1:  Head CT with and without IV contrast. Auto Exposure Controls

were utilized during the CT exam to meet ALARA standards for

radiation dose reduction.



2:  CT angiogram of the head and neck performed with 100 cc of

Omnipaque 350 IV contrast. Sagittal and coronal MIP reformations

were created for better visualization of vascular anatomy. CT

angiogram was post-processed using RAPID LVO detection to include

quantitative measurements of cerebral blood flow and automated

results notification to the stroke and/or neurointerventional

team.



Comparison: Head CT without contrast dated 08/30/2021..



Findings:

Head CT: There is stable appearance of the brain parenchyma with

no abnormal IV contrast enhancement. Again seen moderate sized

chronic cerebral infarct involving the left occipital lobe and

medial posterior left temporal lobe region. Stable small chronic

infarct involving the posterior lateral left frontal

lobe/parietal lobe region. Brain parenchymal volume loss is seen.

There are small areas of low-attenuation involving both cerebral

hemispheres, likely from chronic infarct changes. There is no

evidence of intraparenchymal hemorrhage, brain herniation midline

shift. There is no hydrocephalus. The extracranial soft tissue,

skull, and orbits are unremarkable. There is moderate amount of

peripheral mucosal thickening involving right maxillary sinus.

There is consolidation of the left sphenoid sinus region and mild

ethmoid sinus mucosal thickening. There is a small mucus

retention cyst involving left maxillary sinus.



CT ANGIOGRAM: Three-vessel aortic arch is seen. There is mild

stenosis of the origin of the right subclavian artery due to

atherosclerotic disease. Otherwise bilateral subclavian arteries

are patent. There is motion artifact which obscures portions of

the proximal great vessels. There is significant motion artifact

which may be related to swallowing which obscures the C3 on C4

vertebral body levels in the region of the bilateral carotid

bifurcations. There is an endovascular stent within the distal

right common carotid artery and proximal right cervical ICA which

is patent. There is mild narrowing seen within the stent.



There is tortuosity and mild narrowing with kinking of the mid to

distal cervical right ICA.



There is atherosclerotic disease of the left ICA bulb with motion

artifact significantly obscuring the region and unable to

evaluate this area. There is tortuosity and mild kinking of the

mid cervical left ICA. The remainder of the cervical left ICA is

patent. There is atherosclerotic disease involving the bilateral

cavernous carotid arteries. There is mild to moderate stenosis

involving the left ICA bulb. There is moderate stenosis involving

the paraclinoid left ICA. There is mild narrowing involving the

paraclinoid right ICA. The bilateral ECAs are patent as

visualized.



There is atherosclerotic disease involving the proximal cervical

right vertebral with moderate stenosis extending from the

proximal portion to the C7 level with slightly lens-shaped

appearance.



There is moderate to severe short segment stenosis involving the

right cervical vertebral artery at the C6 level. The remainder of

the cervical right vertebral artery is patent. There is mild to

moderate stenosis involving the intradural right vertebral artery

distally.



There is occlusion of the cervical and left vertebral artery with

some short segment areas of reconstitution proximally with

continuous contrast seen within the cervical left vertebral

artery at the C3 vertebral body level. There are areas of

vascular irregularity and mild to moderate narrowing involving

the distal cervical left vertebral artery. There is moderate

stenosis involving the intradural cervical left vertebral artery

proximally. The left PICA is patent. The post PICA left vertebral

artery is patent.



There is no significant stenosis of the basilar artery. There is

short segment area of moderate to severe stenosis involving the

left P2 PCA region. Normal-appearing caliber left P1 PCA is seen.

There is contrast seen within the distal left P2 and P3 PCA

regions in the area of infarct.



There is a small caliber absent right P1 PCA. There is a fetal

right PCA. The right PCA and distal branches are patent.



The ACAs and distal branches are patent. There is mild narrowing

involving the proximal right M1 MCA. There is a small left M2 MCA

branch which appears to be in the superior segment which extends

into the anterior sylvian fissure region which has diminished

contrast within it. This may be related to proximal stenosis or

clot. The right MCA and distal branches are otherwise patent. The

dural venous sinuses are patent.



The jugular veins are patent as visualized.



Multiple bilateral thyroid nodules are noted.



The neck soft tissue structures show no other significant

abnormality. Emphysematous disease of the visualized upper lungs

are noted. There is cervical spine degenerative disease including

grade 1 anterolisthesis of C4 on C5.



IMPRESSION:

1: Stable CT scan of the brain with no evidence of interval acute

intracranial hemorrhage or cerebral infarct.



2: Again seen chronic infarct involving the left occipital lobe,

medial posterior left temporal lobe and posterior left

frontal/parietal region laterally. MRI of the brain with and

without contrast may help better evaluate for acute finding.



3: There is diminished contrast within a small caliber left M2

MCA vessel which appears to be in the superior segment and

extends in the anterior left sylvian fissure region. This is of

unknown age. Relation to patient's chronic cerebral infarct may

be considered.



4: There is occlusion of the proximal cervical left vertebral

artery with significant stenosis and vascular irregularity

throughout which is described above. This is concerning for

vascular dissection of unknown age. There are also areas of

stenosis of the intradural left vertebral artery.



5: There is a short segment area of moderate to severe stenosis

involving the cervical right vertebral artery at the C6 level and

an area of moderate stenosis involving the proximal cervical

right ICA to the C7 level which may also be related to vascular

dissection of unknown age. The remainder of the cervical right

vertebral artery is patent.



6: There is motion artifact which obscures the bilateral carotid

artery bifurcations with the left side being uninterpretable.

There is an endovascular stent involving the distal right common

carotid artery extending to the proximal cervical right ICA which

is patent.



7: There is a short segment area of moderate to severe stenosis

involving the left P2 PCA, of unknown age. This may be related to

chronic cerebral infarct in the region.



Results of this report was discussed with MARTIN Leroy via

the telephone on 08/30/2021 at 1245 hours.



Dictated by: 



  Dictated on workstation # [id number]

## 2021-08-30 NOTE — DIAGNOSTIC IMAGING REPORT
INDICATION: Chest pain and dizziness and shortness of air.



TIME OF EXAM: 11:43 AM



Correlation is made with prior chest from 07/14/2021.



FINDINGS: Changes of median sternotomy are noted. The left

hemidiaphragm is chronically elevated with some resultant left

basilar subsegmental atelectasis. Right lung is clear. There is

no effusion or pneumothorax.



IMPRESSION: Left basilar subsegmental atelectasis.



Dictated by: 



  Dictated on workstation # [id number]

## 2021-08-30 NOTE — DIAGNOSTIC IMAGING REPORT
PROCEDURE: CT head wo r/o stroke.



TECHNIQUE: Multiple contiguous axial images were obtained through

the brain without the use of intravenous contrast. Auto Exposure

Controls were utilized during the CT exam to meet ALARA standards

for radiation dose reduction. 



INDICATION: Left arm weakness.



Correlation is made with prior head CT from 02/26/2021.



Encephalomalacia and left posterior frontal lobe as well as the

left PCA territory is again noted and similar to prior exam. Old

lacunar infarct right thalamus is again noted. No sulcal

effacement is seen. There is no midline shift. No acute

intra-axial or extra-axial hemorrhage is detected. Cisterns are

patent. Visualized paranasal sinuses demonstrate mucosal

thickening bilateral maxillary sinuses as well as ethmoid air

cells and the sphenoid sinus.



IMPRESSION:

1. Stable chronic intracranial findings when compared with exam

from 02/26/2021. No acute intracranial process is detected.

2. Paranasal sinus disease.



Dictated by: 



  Dictated on workstation # [id number]

## 2021-08-30 NOTE — HISTORY & PHYSICAL
BAINBRIDGE,LUKE MED STUDENT 8/30/21 1559:


History of Present Illness


History of Present Illness


Reason for visit/HPI





Lupe Williamson is a 76 year old white female who presents via EMS from [facility] with complaints of stroke-like symptoms.  Her speech therapist noticed 

around 0800 this am she was a bit lethargic, having trouble talking, and the 

patient is complaining of left face and left finger numbness/tingling.  PMH is 

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

HLP, NIDDM, MI, 4 vessel CABG, and arthritis. Currently she denies headache, 

blurry vision, nausea, vomiting, fevers, chills, and weakness. She is reporting 

numbness and tingling of her left face and left fingertips.  Denies dysuria, 

frequency, urgency, and hematuria.  She reports old deficits of right eye vision

loss, some degree of expressive aphasia at baseline, and right leg weakness 

after suffering a CVA one day after CABG in October of 2020.


Date of Admission


Aug 30, 2021 at 14:38


Date Seen by a Provider:  Aug 30, 2021


Time Seen by a Provider:  15:00


I consulted on this patient on


8/30/21


 15:54


Attending Physician


Nancy Bah DO


Admitting Physician


Nancy Bah DO


Consult








Allergies and Home Medications


Allergies


Coded Allergies:  


     No Known Drug Allergies (Unverified , 11/12/20)





Home Medications


Amlodipine Besylate 5 Mg Tablet, 10 MG PO DAILY


   Prescribed by: NANCY BAH on 12/3/20 1651


   Last Action: Continued


Aspirin 81 Mg Tab.chew, 81 MG PO DAILY


   Prescribed by: NANCY BAH on 12/3/20 1651


Atorvastatin Calcium 40 Mg Tablet, 40 MG PO DAILY


   Prescribed by: NANCY BAH on 12/3/20 1651


   Last Action: Continued


Budesonide 0.5 Mg/2 Ml Ampul.neb, 0.5 MG IH RTBID


   Prescribed by: NANCY BAH on 12/3/20 1651


Carvedilol 3.125 Mg Tablet, 3.125 MG PO BID


   Prescribed by: NANCY BAH on 12/3/20 1651


   Last Action: Continued


Cefuroxime Axetil 500 Mg Tablet, 500 MG PO BID


   . 


   Prescribed by: SHEA MADDOX on 7/14/21 1332


Cephalexin 500 Mg Tablet, 500 MG PO TID


   Prescribed by: IFEOMA MARTINES on 2/26/21 1530


Clopidogrel Bisulfate 75 Mg Tablet, 75 MG PO DAILY


   Prescribed by: NANCY BAH on 12/3/20 1651


Famotidine 20 Mg Tablet, 20 MG PO DAILY


   Prescribed by: NANCY BAH on 12/3/20 1651


   Last Action: Continued


Ipratropium/Albuterol Sulfate 3 Ml Ampul.neb, 3 ML IH RTBID


   Prescribed by: NANCY BAH on 12/3/20 1651


Sennosides/Docusate Sodium 1 Each Tablet, 1 EA PO BID


   Prescribed by: NANCY BAH on 12/3/20 1651





Past Medical-Social-Family Hx


Patient Social History


Employed/Student:  retired


Tobacco Use?:  No


Smoking Status:  Former Smoker (1.2-2ppd x 50 years)


Smokeless Tobacco Frequency:  Never a User


Use of E-Cig and/or Vaping dev:  No


Use of E-Cig and/or Vaping Victorino:  Never a User


Substance use?:  No


Alcohol Use?:  No


Pt feels they are or have been:  No





Immunizations Up To Date


Date of Influenza Vaccine:  Nov 10, 2020


First/Initial COVID19 Vaccinat:  05/2021


Second COVID19 Vaccination Joey:  06/2021


Tetanus Booster (TDap):  More Than 5 Years





Seasonal Allergies


Seasonal Allergies:  No





Current Status


Pregnancy status:  No


Breastfeeding status:  No


Advance Directives:  No


Communicates:  Verbally


Primary Language:  English


Preferred Spoken Language:  English


Is interpretation needed?:  No


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


Implanted or Applied Medical D:  None





Past Medical History


Surgeries:  Cardiac, CABG, Coronary Stent


COPD


Currently Using CPAP:  No


Currently Using BIPAP:  No


Aneurysm (R groin vessel s/p carotid artery stent placement), Cardiomyopathy, 

Coronary Artery Disease, Heart Attack, High Cholesterol, Hypertension


Stroke


Sexually Transmitted Disease:  No


HIV/AIDS:  No


Arthritis


Diabetes, Non-Insulin dep


Loss of Vision:  Right (Blind)


Hearing Impairment:  Denies


Blood Disorders:  No





Family Medical History





Patient reports no known family medical history.


Heart Disease, Diabetes, Hypertension





Review of Systems


Constitutional:  No chills, No diaphoresis, No dizziness, No malaise


EENTM:  vision loss (Blind R eye after prior CVA); No hearing loss, No blurred 

vision, No double vision


Respiratory:  No cough, No dyspnea on exertion, No hemoptysis, No short of 

breath


Cardiovascular:  No chest pain, No edema, No palpitations


Gastrointestinal:  No abdominal pain, No constipation, No diarrhea, No melena, 

No nausea, No vomiting


Genitourinary:  No decreased output, No discharge, No dysuria, No frequency, No 

hematuria, No incontinence


Pregnant:  No


Musculoskeletal:  No back pain, No joint pain


Skin:  No change in color, No change in hair/nails, No lesions, No rash


Psychiatric/Neurological:  Denies Anxiety, Denies Depressed; Numbness (L face 

and left fingers), Pre-Existing Deficit (Residual weakness right lower extrem 

s/p old CVA)





All Other Systems Reviewed


Negative Unless Noted:  Yes





Physical Exam


Vital Signs





Vital Signs - First Documented








 8/30/21





 11:21


 


Temp 36.5


 


Pulse 68


 


Resp 17


 


B/P (MAP) 132/61 (84)


 


O2 Delivery Room Air





Capillary Refill : Less Than 3 Seconds


Height, Weight, BMI


Height: '"


Weight: lbs. oz. kg; 26.00 BMI


Method:


General Appearance:  No Apparent Distress, WD/WN


Eyes:  Bilateral Eye Normal Inspection, Bilateral Eye PERRL, Bilateral Eye EOMI


HEENT:  PERRL/EOMI, Pharynx Normal, Moist Mucous Membranes


Neck:  Full Range of Motion, Non Tender, Supple


Respiratory:  Chest Non Tender, Lungs Clear, Normal Breath Sounds, No Accessory 

Muscle Use


Cardiovascular:  Regular Rate, Rhythm, No Murmur, Normal Peripheral Pulses


Gastrointestinal:  Normal Bowel Sounds, Non Tender, Soft


Rectal:  Deferred


Back:  Normal Inspection, No CVA Tenderness, No Vertebral Tenderness


Extremity:  Normal Capillary Refill, Non Tender, No Calf Tenderness, No Pedal 

Edema


Neurologic/Psychiatric:  Alert, Oriented x3, Normal Mood/Affect, Other (4/5 

flexion R hip strength)


Skin:  Normal Color, Warm/Dry


Lymphatic:  No Adenopathy





Assessment/Plan


Assessment and Plan


Questionable subacute infarct involving posterior left frontal lobe


Expressive aphasia


Numbness/tingling left face and left fingertips


UTI


HTN


HLP


CAD


MI


COPD


NIDDM


Carotid artery stenosis with stent placement


H/O 4 vessel CABG


H/O CVA 1 day after CABG





Rocephin for UTI


Urine culture


Continue plavix and ASA


Lipitor


Accuchecks achs, SSI


Diabetic diet


EKG showed NSR


CT angio head and neck, stable CT w/o evidence of interval acute hemorrhage or 

infarct


Brain MRI, focal area abnormal signal involving the high posterior L frontal 

lobe region, may be possible subacute infarct


Neuro checks q4hrs x6


Fall precautions





Admission Diagnosis


Admission Status:  Observation


Reason for Inpatient Admission:  


Antibiotics UTI


Neurologic monitoring





Clinical Quality Measures


Stroke:


Date of last known well:  Aug 30, 2021


Time of last known well:  08:15





NANCY BAH DO 8/31/21 0547:


History of Present Illness


History of Present Illness


Reason for visit/HPI


Chief complaint: Acute on chronic stroke symptoms





History of present illness: This is a 76-year-old white female clinic patient of

[facility] since recovered from CVA following bypass surgery and lengthy stay at 

inpatient rehab who had just moved to [facility] assisted living who was

sent to ER due to suspicion of stroke.  Apparently she was having speech therapy

when they noticed left face drooping and dysarthria.  Imaging was evaluated 

which showed very complicated cerebrovascular disease but NIH score was 3 so UTI

was noted and will be treated with Rocephin and full evaluation will be 

completed.





Allergies and Home Medications


Allergies


Coded Allergies:  


     No Known Drug Allergies (Unverified , 11/12/20)





Home Medications


Amlodipine Besylate 5 Mg Tablet, 10 MG PO DAILY


   Prescribed by: NANCY BAH on 12/3/20 1651


   Last Action: Continued


Aspirin 81 Mg Tab.chew, 81 MG PO DAILY


   Prescribed by: NANCY BAH on 12/3/20 1651


Atorvastatin Calcium 40 Mg Tablet, 40 MG PO DAILY


   Prescribed by: NANCY BAH on 12/3/20 1651


   Last Action: Continued


Budesonide 0.5 Mg/2 Ml Ampul.neb, 0.5 MG IH RTBID


   Prescribed by: NANCY BAH on 12/3/20 1651


Carvedilol 3.125 Mg Tablet, 3.125 MG PO BID


   Prescribed by: NANCY BAH on 12/3/20 1651


   Last Action: Continued


Cefuroxime Axetil 500 Mg Tablet, 500 MG PO BID


   . 


   Prescribed by: SHEA MADDOX on 7/14/21 1332


Cephalexin 500 Mg Tablet, 500 MG PO TID


   Prescribed by: IFEOMA MARTINES on 2/26/21 1530


Clopidogrel Bisulfate 75 Mg Tablet, 75 MG PO DAILY


   Prescribed by: NANCY BAH on 12/3/20 1651


Famotidine 20 Mg Tablet, 20 MG PO DAILY


   Prescribed by: NANCY BAH on 12/3/20 1651


   Last Action: Continued


Ipratropium/Albuterol Sulfate 3 Ml Ampul.neb, 3 ML IH RTBID


   Prescribed by: NANCY BAH on 12/3/20 1651


Sennosides/Docusate Sodium 1 Each Tablet, 1 EA PO BID


   Prescribed by: NANCY BAH on 12/3/20 1651





Patient Home Medication List


Home Medication List Reviewed:  Yes





Past Medical-Social-Family Hx


Patient Social History


Marrital Status:  single


Employed/Student:  retired


Smoking Status:  Former Smoker (1.2-2ppd x 50 years)


Smokeless Tobacco Frequency:  Never a User


Use of E-Cig and/or Vaping dev:  No


Use of E-Cig and/or Vaping Victorino:  Never a User


Pt feels they are or have been:  No





Past Medical History


Surgeries:  Cardiac, CABG, Coronary Stent, Vascular Surgery


Aneurysm (R groin vessel s/p carotid artery stent placement), Cardiomyopathy, 

Coronary Artery Disease, Heart Attack, High Cholesterol, Hypertension


Stroke


Gastroesophageal Reflux


Arthritis, Chronic Back Pain


Diabetes, Non-Insulin dep





Family Medical History





Patient reports no known family medical history.





Review of Systems


Constitutional:  no symptoms reported


EENTM:  vision loss (Blind R eye after prior CVA)


Respiratory:  no symptoms reported


Cardiovascular:  no symptoms reported


Gastrointestinal:  no symptoms reported


Genitourinary:  no symptoms reported


Musculoskeletal:  no symptoms reported


Skin:  no symptoms reported


Psychiatric/Neurological:  Numbness (L face and left fingers), Pre-Existing 

Deficit (Residual weakness right lower extrem s/p old CVA), Tingling, Weakness





Physical Exam


General Appearance:  No Apparent Distress, WD/WN, Chronically ill


Eyes:  Bilateral Eye Normal Inspection, Bilateral Eye PERRL, Bilateral Eye EOMI


HEENT:  PERRL/EOMI, Normal ENT Inspection, Pharynx Normal


Neck:  Full Range of Motion, Normal Inspection, Non Tender, Supple, Carotid 

Bruit


Respiratory:  Chest Non Tender, Lungs Clear, Normal Breath Sounds, No Accessory 

Muscle Use, No Respiratory Distress


Cardiovascular:  Regular Rate, Rhythm, No Edema, No Gallop, No JVD, No Murmur, 

Normal Peripheral Pulses


Gastrointestinal:  Normal Bowel Sounds, No Organomegaly, No Pulsatile Mass, Non 

Tender, Soft


Back:  Normal Inspection, No CVA Tenderness, No Vertebral Tenderness


Extremity:  Normal Capillary Refill, Normal Inspection, Normal Range of Motion, 

Non Tender, No Calf Tenderness, No Pedal Edema


Neurologic/Psychiatric:  Alert, Oriented x3, Normal Mood/Affect, Motor Weakness 

(Subtle weakness), Other (4/5 flexion R hip strength)


Skin:  Normal Color, Warm/Dry


Lymphatic:  No Adenopathy





Assessment/Plan


Assessment and Plan





Assessment: 


Acute on chronic stroke symptoms


h/o CVA involving left occipital artery


h/o 3 vessel CABG


PVD


Expressive aphasia


Dysarthria


Right sided weakness


Diabetes mellitus


COPD


CAD





Plan:


Rocephin


Home meds


PT OT


Inpatient rehab





Problems:  


(1) Expressive aphasia


(2) UTI (urinary tract infection)


Status:  Acute


(3) Dysphagia





Admission Diagnosis


Admission Status:  Observation


Reason for Inpatient Admission:  


Stroke symptoms





Supervisory-Addendum Brief


Verification & Attestation


Participated in pt care:  history, MDM, physical


Personally performed:  exam, history, MDM, supervision of care


Care discussed with:  Medical Student


Procedures:  n/a


Results interpretation:  Verified all documentation


Verification and Attestation of Medical Student E/M Service





A medical student performed and documented this service in my presence. I 

reviewed and verified all information documented by the medical student and made

modifications to such information, when appropriate. I personally performed the 

physical exam and medical decision making. 





 Nancy Bah, Aug 31, 2021,05:51











BAINBRIDGE,LUKE MED STUDENT    Aug 30, 2021 15:59


NANCY BAH DO                Aug 31, 2021 05:47

## 2021-08-31 VITALS — SYSTOLIC BLOOD PRESSURE: 145 MMHG | DIASTOLIC BLOOD PRESSURE: 60 MMHG

## 2021-08-31 VITALS — DIASTOLIC BLOOD PRESSURE: 68 MMHG | SYSTOLIC BLOOD PRESSURE: 158 MMHG

## 2021-08-31 VITALS — DIASTOLIC BLOOD PRESSURE: 60 MMHG | SYSTOLIC BLOOD PRESSURE: 145 MMHG

## 2021-08-31 VITALS — DIASTOLIC BLOOD PRESSURE: 78 MMHG | SYSTOLIC BLOOD PRESSURE: 157 MMHG

## 2021-08-31 LAB
ALBUMIN SERPL-MCNC: 3.6 GM/DL (ref 3.2–4.5)
ALP SERPL-CCNC: 79 U/L (ref 40–136)
ALT SERPL-CCNC: 14 U/L (ref 0–55)
BASOPHILS # BLD AUTO: 0 10^3/UL (ref 0–0.1)
BASOPHILS NFR BLD AUTO: 0 % (ref 0–10)
BILIRUB SERPL-MCNC: 0.3 MG/DL (ref 0.1–1)
BUN/CREAT SERPL: 13
CALCIUM SERPL-MCNC: 9.1 MG/DL (ref 8.5–10.1)
CHLORIDE SERPL-SCNC: 105 MMOL/L (ref 98–107)
CHOLEST SERPL-MCNC: 146 MG/DL (ref ?–200)
CO2 SERPL-SCNC: 25 MMOL/L (ref 21–32)
CREAT SERPL-MCNC: 0.64 MG/DL (ref 0.6–1.3)
EOSINOPHIL # BLD AUTO: 0.1 10^3/UL (ref 0–0.3)
EOSINOPHIL NFR BLD AUTO: 2 % (ref 0–10)
GFR SERPLBLD BASED ON 1.73 SQ M-ARVRAT: 90 ML/MIN
GLUCOSE SERPL-MCNC: 120 MG/DL (ref 70–105)
HCT VFR BLD CALC: 39 % (ref 35–52)
HDLC SERPL-MCNC: 45 MG/DL (ref 40–60)
HGB BLD-MCNC: 12 G/DL (ref 11.5–16)
LYMPHOCYTES # BLD AUTO: 1.5 10^3/UL (ref 1–4)
LYMPHOCYTES NFR BLD AUTO: 26 % (ref 12–44)
MANUAL DIFFERENTIAL PERFORMED BLD QL: NO
MCH RBC QN AUTO: 27 PG (ref 25–34)
MCHC RBC AUTO-ENTMCNC: 31 G/DL (ref 32–36)
MCV RBC AUTO: 87 FL (ref 80–99)
MONOCYTES # BLD AUTO: 0.5 10^3/UL (ref 0–1)
MONOCYTES NFR BLD AUTO: 9 % (ref 0–12)
NEUTROPHILS # BLD AUTO: 3.6 10^3/UL (ref 1.8–7.8)
NEUTROPHILS NFR BLD AUTO: 63 % (ref 42–75)
PLATELET # BLD: 319 10^3/UL (ref 130–400)
PMV BLD AUTO: 9.4 FL (ref 9–12.2)
POTASSIUM SERPL-SCNC: 3.8 MMOL/L (ref 3.6–5)
PROT SERPL-MCNC: 6.7 GM/DL (ref 6.4–8.2)
SODIUM SERPL-SCNC: 139 MMOL/L (ref 135–145)
TRIGL SERPL-MCNC: 69 MG/DL (ref ?–150)
VLDLC SERPL CALC-MCNC: 14 MG/DL (ref 5–40)
WBC # BLD AUTO: 5.8 10^3/UL (ref 4.3–11)

## 2021-08-31 RX ADMIN — SODIUM CHLORIDE, SODIUM LACTATE, POTASSIUM CHLORIDE, AND CALCIUM CHLORIDE SCH MLS/HR: 600; 310; 30; 20 INJECTION, SOLUTION INTRAVENOUS at 02:05

## 2021-08-31 NOTE — PROGRESS NOTE
LEANNE CALLAWAY 8/31/21 1323:


Progress Note


75yo WF with PMH of HTN, CAD, HLD, NIDDM, MI, 4x CABG, and CVA of LAC in 2020 

presents with complaints of stroke like symptoms affecting her left side with 

dysarthria, numbness, and tingling. Pt went to the ED after speech therapist 

noticed lethargy, trouble talking, and those left sided somatic complaints. C

ardiology was consulted with recommendation to continue aspirin/Plavix. Short 

term PT consulted. 08/31, pt reported no complaints and denies any fever, 

vomiting, chest pain, or any urinary complaints. Pt discharged to assisted 

living with [facility] prior to complete OT evaluation and plans to have future 

appointment with Dr. Petit at her clinic.





NANCY PETIT DO 9/1/21 0612:


Supervisory-Addendum Brief


Verification & Attestation


Participated in pt care:  history, MDM, physical


Personally performed:  exam, history, MDM, supervision of care


Care discussed with:  Medical Student


Procedures:  n/a


Results interpretation:  Verified all documentation


Verification and Attestation of Medical Student E/M Service





A medical student performed and documented this service in my presence. I 

reviewed and verified all information documented by the medical student and made

modifications to such information, when appropriate. I personally performed the 

physical exam and medical decision making. 





 Nancy Petit Sep 1, 2021,06:12











LEANNE CALLAWAY                    Aug 31, 2021 13:23


NANCY PETIT DO                 Sep 1, 2021 06:12

## 2021-08-31 NOTE — PHYSICAL THERAPY EVALUATION
PT Evaluation-General


Medical Diagnosis


Admission Date


Aug 30, 2021 at 14:38


Medical Diagnosis:  UTI/CVA


Onset Date:  Aug 30, 2021





Therapy Diagnosis


Therapy Diagnosis:  debility/weakness





Precautions


Precautions/Isolations:  Fall Prevention, Standard Precautions





Referral


Physician:  Damaso


Reason for Referral:  Evaluation/Treatment





Medical History


Pertinent Medical History:  CABG, CAD, COPD, CVA, DM, PVD


Current History


EMS secondary to increase slurred speech


Reviewed History:  Yes





Social History


Home:  Assisted Living





Prior


Prior Level of Function


SCALE: Activities may be completed with or without assistive devices.





6-Indepedent-patient completes the activity by him/herself with no assistance 

from a helper.


5-Set-up or Clean-up Assistance-helper sets up or cleans up; patient completes 

activity. [place] assists only prior to or  


    following the activity.


4-Supervision or Touching Assistance-helper provides verbal cues and/or 

touching/steadying and/or contact guard assistance as patient completes 

activity. Assistance may be provided   


    throughout the activity or intermittently.


3-Partial/Moderate Assistance-helper does LESS THAN HALF the effort. [place] 

lifts, holds or supports trunk or limbs, but provides less than half the effort.


2-Substantial/Maximal Assistance-helper does MORE THAN HALF the effort. [place] 

lifts or holds trunk or limbs and provides more than half the effort.


7-Wdbwmsswj-iyhway does ALL the effort. Patient does none of the effort to 

complete the activity. Or, the assistance of 2 or more helpers is required for 

the patient to complete the  


    activity.


If activity was not attempted, code reason:


7-Patient Refused.


9-Not Applicable-not attempted and the patient did not perform the activity 

before the current illness, exacerbation or injury.


10-Not Attempted due to Environmental Limitations-(lack of equipment, weather 

restraints, etc.).


88-Not Attempted due to Medical Conditions or Safety Concerns.


Bed Mobility:  5


Transfers (B,C,W/C):  5


Gait:  5


Indoor Mobility (Ambulation):  Independent


Prior Devices Use:  Walker





PT Evaluation-Current


Subjective


Patient agrees to PT.  She reports she is feeling better.





Objective


Patient Orientation:  Normal For Age





ROM/Strength


ROM Lower Extremities


bilateral LE WFL


Strength Lower Extremities


4/5 grossly bilateral LE





Integumentary/Posture


Bowel Incontinence:  No


Bladder Incontinence:  No


Posture


WFL





Neuromuscular


(Tone, Coordination, Reflexes)


grossly intact





Sensory


Vision:  Wears Glasses


Hearing:  Functional





Transfers


Lying to Sitting/Side of Bed(Q:  5


Sit to Stand (QC):  5


Chair/Bed-to-Chair Xfer(QC):  5





Gait


Does the Patient Walk?:  Yes


Mode of Locomotion:  Walk


Anticipated Mode of Locomotion:  Walk


Walk 10 feet (QC):  4


Walk 50 ft with 2 Turns(QC):  4


Walk 150 ft (QC):  4


Distance:  250'


Gait Assistive Device:  FWW


Comments/Gait Description


safe and functional with no deviation





Balance


Sitting Static:  Normal


Sitting Dynamic:  Normal


Standing Static:  Normal


 Standing Dynamic:  Normal





Assessment/Needs


76 y.o. female, will be seen short term by skilled PT to address functional 

mobility to ensure safe return to AL at maximum LOF.


Rehab Potential:  Fair





PT Long Term Goals


Long Term Goals


PT Long Term Goals Time Frame:  Sep 11, 2021


Roll Left & Right (QC):  5


Sit to Lying (QC):  5


Lying-Sitting on Side/Bed(QC):  5


Sit to Stand (QC):  5


Chair/Bed-to-Chair Xfer(QC):  5


Toilet Transfer (QC):  5


Does the Patient Walk:  Yes


Walk 10 feet (QC):  5


Walk 50ft with 2 Turns (QC):  5


Walk 150 ft (QC):  5





PT Plan


Treatment/Plan


Treatment Plan:  Continue Plan of Care


Treatment Plan:  Education, Functional Activity Nona, Functional Strength, 

Gait, Safety, Therapeutic Exercise, Transfers


Treatment Duration:  Sep 11, 2021


Frequency:  6 times per week


Estimated Hrs Per Day:  .25 hour per day


Patient and/or Family Agrees t:  Yes





Time/GCodes


Time In:  820


Time Out:  834


Total Billed Treatment Time:  14


Total Billed Treatment


1 visit


EVMod 14 min











AVELINA ADRIAN PT              Aug 31, 2021 10:33

## 2021-08-31 NOTE — OCC THERAPY PROGRESS NOTE
Therapy Progress Note


OT orders received, pt discharged prior to OT evaluation being complete.











BERT RAINEY OT          Aug 31, 2021 11:54
Unknown

## 2021-09-09 NOTE — PHYSICIAN QUERY-FINAL DX
ROBERT CASTNAEDA 9/9/21 0929:


Final Diagnosis


Give Final Diagnosis


Please give Final Diagnosis





ANDREY BAH DO 9/9/21 1818:


Final Diagnosis


Give Final Diagnosis


UTI with encephalopathy











ROBERT CASTANEDA                     Sep 9, 2021 09:29


ANDREY BAH DO                 Sep 9, 2021 18:18

## 2021-10-19 ENCOUNTER — HOSPITAL ENCOUNTER (OUTPATIENT)
Dept: HOSPITAL 75 - REHAB | Age: 76
LOS: 13 days | Discharge: HOME | End: 2021-11-01
Attending: INTERNAL MEDICINE
Payer: MEDICARE

## 2021-10-19 DIAGNOSIS — E11.9: ICD-10-CM

## 2021-10-19 DIAGNOSIS — R26.89: ICD-10-CM

## 2021-10-19 DIAGNOSIS — I11.9: ICD-10-CM

## 2021-10-19 DIAGNOSIS — I69.351: Primary | ICD-10-CM

## 2021-10-26 ENCOUNTER — HOSPITAL ENCOUNTER (INPATIENT)
Dept: HOSPITAL 75 - ER | Age: 76
LOS: 3 days | Discharge: HOME HEALTH | DRG: 65 | End: 2021-10-29
Attending: INTERNAL MEDICINE | Admitting: INTERNAL MEDICINE
Payer: MEDICARE

## 2021-10-26 VITALS — SYSTOLIC BLOOD PRESSURE: 195 MMHG | DIASTOLIC BLOOD PRESSURE: 122 MMHG

## 2021-10-26 VITALS — DIASTOLIC BLOOD PRESSURE: 77 MMHG | SYSTOLIC BLOOD PRESSURE: 141 MMHG

## 2021-10-26 VITALS — WEIGHT: 130.73 LBS | BODY MASS INDEX: 22.32 KG/M2 | HEIGHT: 63.98 IN

## 2021-10-26 DIAGNOSIS — R47.01: ICD-10-CM

## 2021-10-26 DIAGNOSIS — Z95.1: ICD-10-CM

## 2021-10-26 DIAGNOSIS — I25.2: ICD-10-CM

## 2021-10-26 DIAGNOSIS — R47.1: ICD-10-CM

## 2021-10-26 DIAGNOSIS — M54.9: ICD-10-CM

## 2021-10-26 DIAGNOSIS — I42.9: ICD-10-CM

## 2021-10-26 DIAGNOSIS — F32.A: ICD-10-CM

## 2021-10-26 DIAGNOSIS — I10: ICD-10-CM

## 2021-10-26 DIAGNOSIS — G81.91: ICD-10-CM

## 2021-10-26 DIAGNOSIS — Z87.891: ICD-10-CM

## 2021-10-26 DIAGNOSIS — M19.90: ICD-10-CM

## 2021-10-26 DIAGNOSIS — Q21.1: ICD-10-CM

## 2021-10-26 DIAGNOSIS — I25.10: ICD-10-CM

## 2021-10-26 DIAGNOSIS — E78.00: ICD-10-CM

## 2021-10-26 DIAGNOSIS — G89.29: ICD-10-CM

## 2021-10-26 DIAGNOSIS — R29.704: ICD-10-CM

## 2021-10-26 DIAGNOSIS — I63.9: Primary | ICD-10-CM

## 2021-10-26 DIAGNOSIS — E11.9: ICD-10-CM

## 2021-10-26 DIAGNOSIS — Z95.5: ICD-10-CM

## 2021-10-26 DIAGNOSIS — Z79.82: ICD-10-CM

## 2021-10-26 DIAGNOSIS — J44.9: ICD-10-CM

## 2021-10-26 DIAGNOSIS — N39.0: ICD-10-CM

## 2021-10-26 DIAGNOSIS — Z79.899: ICD-10-CM

## 2021-10-26 DIAGNOSIS — K21.9: ICD-10-CM

## 2021-10-26 DIAGNOSIS — E78.2: ICD-10-CM

## 2021-10-26 LAB
ALBUMIN SERPL-MCNC: 4.3 GM/DL (ref 3.2–4.5)
ALP SERPL-CCNC: 91 U/L (ref 40–136)
ALT SERPL-CCNC: 17 U/L (ref 0–55)
APTT BLD: 38 SEC (ref 24–35)
APTT PPP: YELLOW S
BACTERIA #/AREA URNS HPF: NEGATIVE /HPF
BASOPHILS # BLD AUTO: 0.1 10^3/UL (ref 0–0.1)
BASOPHILS NFR BLD AUTO: 1 % (ref 0–10)
BILIRUB SERPL-MCNC: 0.4 MG/DL (ref 0.1–1)
BILIRUB UR QL STRIP: NEGATIVE
BUN/CREAT SERPL: 19
CALCIUM SERPL-MCNC: 9.8 MG/DL (ref 8.5–10.1)
CHLORIDE SERPL-SCNC: 101 MMOL/L (ref 98–107)
CO2 SERPL-SCNC: 24 MMOL/L (ref 21–32)
CREAT SERPL-MCNC: 0.74 MG/DL (ref 0.6–1.3)
D DIMER PPP FEU-MCNC: 0.37 UG/ML (ref 0–0.49)
EOSINOPHIL # BLD AUTO: 0.1 10^3/UL (ref 0–0.3)
EOSINOPHIL NFR BLD AUTO: 2 % (ref 0–10)
FIBRINOGEN PPP-MCNC: CLEAR MG/DL
GFR SERPLBLD BASED ON 1.73 SQ M-ARVRAT: 77 ML/MIN
GLUCOSE SERPL-MCNC: 103 MG/DL (ref 70–105)
GLUCOSE UR STRIP-MCNC: NEGATIVE MG/DL
HCT VFR BLD CALC: 42 % (ref 35–52)
HGB BLD-MCNC: 13.5 G/DL (ref 11.5–16)
HYALINE CASTS #/AREA URNS LPF: (no result) /LPF
INR PPP: 1 (ref 0.8–1.4)
KETONES UR QL STRIP: NEGATIVE
LEUKOCYTE ESTERASE UR QL STRIP: (no result)
LYMPHOCYTES # BLD AUTO: 2.6 10^3/UL (ref 1–4)
LYMPHOCYTES NFR BLD AUTO: 33 % (ref 12–44)
MANUAL DIFFERENTIAL PERFORMED BLD QL: NO
MCH RBC QN AUTO: 29 PG (ref 25–34)
MCHC RBC AUTO-ENTMCNC: 32 G/DL (ref 32–36)
MCV RBC AUTO: 90 FL (ref 80–99)
MONOCYTES # BLD AUTO: 0.8 10^3/UL (ref 0–1)
MONOCYTES NFR BLD AUTO: 10 % (ref 0–12)
NEUTROPHILS # BLD AUTO: 4.2 10^3/UL (ref 1.8–7.8)
NEUTROPHILS NFR BLD AUTO: 55 % (ref 42–75)
NITRITE UR QL STRIP: NEGATIVE
PH UR STRIP: 6 [PH] (ref 5–9)
PLATELET # BLD: 378 10^3/UL (ref 130–400)
PMV BLD AUTO: 9.8 FL (ref 9–12.2)
POTASSIUM SERPL-SCNC: 4.2 MMOL/L (ref 3.6–5)
PROT SERPL-MCNC: 8 GM/DL (ref 6.4–8.2)
PROT UR QL STRIP: NEGATIVE
PROTHROMBIN TIME: 13.6 SEC (ref 12.2–14.7)
RBC #/AREA URNS HPF: (no result) /HPF
SODIUM SERPL-SCNC: 136 MMOL/L (ref 135–145)
SP GR UR STRIP: 1.02 (ref 1.02–1.02)
SQUAMOUS #/AREA URNS HPF: (no result) /HPF
WBC # BLD AUTO: 7.8 10^3/UL (ref 4.3–11)
WBC #/AREA URNS HPF: (no result) /HPF

## 2021-10-26 PROCEDURE — 85730 THROMBOPLASTIN TIME PARTIAL: CPT

## 2021-10-26 PROCEDURE — 82947 ASSAY GLUCOSE BLOOD QUANT: CPT

## 2021-10-26 PROCEDURE — 85379 FIBRIN DEGRADATION QUANT: CPT

## 2021-10-26 PROCEDURE — 93041 RHYTHM ECG TRACING: CPT

## 2021-10-26 PROCEDURE — 80053 COMPREHEN METABOLIC PANEL: CPT

## 2021-10-26 PROCEDURE — 70551 MRI BRAIN STEM W/O DYE: CPT

## 2021-10-26 PROCEDURE — 85025 COMPLETE CBC W/AUTO DIFF WBC: CPT

## 2021-10-26 PROCEDURE — 80061 LIPID PANEL: CPT

## 2021-10-26 PROCEDURE — 93005 ELECTROCARDIOGRAM TRACING: CPT

## 2021-10-26 PROCEDURE — 36415 COLL VENOUS BLD VENIPUNCTURE: CPT

## 2021-10-26 PROCEDURE — 87088 URINE BACTERIA CULTURE: CPT

## 2021-10-26 PROCEDURE — 70498 CT ANGIOGRAPHY NECK: CPT

## 2021-10-26 PROCEDURE — 81000 URINALYSIS NONAUTO W/SCOPE: CPT

## 2021-10-26 PROCEDURE — 84484 ASSAY OF TROPONIN QUANT: CPT

## 2021-10-26 PROCEDURE — 71045 X-RAY EXAM CHEST 1 VIEW: CPT

## 2021-10-26 PROCEDURE — 70496 CT ANGIOGRAPHY HEAD: CPT

## 2021-10-26 PROCEDURE — 87186 SC STD MICRODIL/AGAR DIL: CPT

## 2021-10-26 PROCEDURE — 93306 TTE W/DOPPLER COMPLETE: CPT

## 2021-10-26 PROCEDURE — 70450 CT HEAD/BRAIN W/O DYE: CPT

## 2021-10-26 PROCEDURE — 85610 PROTHROMBIN TIME: CPT

## 2021-10-26 RX ADMIN — FLUOXETINE SCH MG: 20 CAPSULE ORAL at 20:28

## 2021-10-26 NOTE — ED NEUROLOGICAL PROBLEM
General


Chief Complaint:  Neuro-Stroke Like Symptoms


Stated Complaint:  L ARM NUMBNESS/DIFF SPEAKING


Nursing Triage Note:  


C/O SLURRED SPEECH AND LEFT ARM WEAKNESS LAST KNOWN WELL TIME IS 1000AM TODAY. 


DAUGHTER AT BEDSIDE, ACTIVATION DONE AT THIS TIME.


Source:  patient


Exam Limitations:  no limitations





History of Present Illness


Date Seen by Provider:  Oct 26, 2021


Time Seen by Provider:  16:05


Initial Comments


This 76-year-old woman presents to the emergency room with complaints of 

difficulty speaking and left-sided numbness and weakness that started abruptly 

around 10:00 this morning.  She denied any neurologic deficits when she went to 

bed or when she woke up this morning.  She does have history of prior stroke.  

She takes aspirin and Plavix.  She denies any other notable symptoms prior to 

her last known well time.  Stroke activation was paged.  Unfortunately, 

tenecteplase was not considered as she was beyond 4.5 hours from last known well

time.





Allergies and Home Medications


Allergies


Coded Allergies:  


     No Known Drug Allergies (Unverified , 20)





Patient Home Medication List


Home Medication List Reviewed:  Yes


Amlodipine Besylate (Amlodipine Besylate) 5 Mg Tablet, 5 MG PO DAILY, (Reported)


   Entered as Reported by: ANJELICA PEPPER on 21


   Last Action: Reviewed


Aspirin (Aspirin EC) 81 Mg Tablet.dr, 81 MG PO DAILY, (Reported)


   Entered as Reported by: ANJELICA PEPPER on 21


   Last Action: Reviewed


Atorvastatin Calcium (Atorvastatin Calcium) 40 Mg Tablet, 40 MG PO DAILY, 

(Reported)


   Entered as Reported by: ANJELICA PEPPER on 21


   Last Action: Reviewed


Budesonide/Formoterol Fumarate (Symbicort 160-4.5 Mcg Inhaler) 10.2 Gm 

Hfa.aer.ad, 2 PUFF IH BID, (Reported)


   Entered as Reported by: ANJELICA PEPPER on 21


   Last Action: Reviewed


Carvedilol (Carvedilol) 3.125 Mg Tablet, 3.125 MG PO BID, (Reported)


   Entered as Reported by: ANJELICA PEPPER on 21


   Last Action: Reviewed


Clopidogrel Bisulfate (Clopidogrel) 75 Mg Tablet, 75 MG PO DAILY, (Reported)


   Entered as Reported by: ANJELICA PEPPER on 21


   Last Action: Reviewed


Famotidine (Famotidine) 20 Mg Tablet, 20 MG PO DAILY, (Reported)


   Entered as Reported by: ANJELICA PEPPER on 21


   Last Action: Reviewed


Fluoxetine HCl (Fluoxetine HCl) 40 Mg Capsule, 40 MG PO HS, (Reported)


   Entered as Reported by: ANJELICA PEPPER on 21


   Last Action: Reviewed


Olmesartan/Hydrochlorothiazide (Benicar Hct 20-12.5 mg Tablet) 1 Each Tablet, 1 

EACH PO HS, (Reported)


   Entered as Reported by: ANJELICA PEPPER on 21


   Last Action: Reviewed


Sennosides/Docusate Sodium (Senna-S Tablet) 1 Each Tablet, 1 EACH PO BID, 

(Reported)


   Entered as Reported by: ANJELICA PEPPER on 21


   Last Action: Reviewed


Discontinued Medications


Cefdinir (Cefdinir) 300 Mg Capsule, 300 MG PO BID


   Discontinued Reason: No Longer Taking


   Prescribed by: ANDREY BAH on 21 102


   Last Action: Discontinued





Review of Systems


Review of Systems


Constitutional:  no symptoms reported


Eyes:  No Symptoms Reported


Ears, Nose, Mouth, Throat:  no symptoms reported


Respiratory:  no symptoms reported


Cardiovascular:  no symptoms reported


Genitourinary:  no symptoms reported


Musculoskeletal:  no symptoms reported


Skin:  no symptoms reported


Psychiatric/Neurological:  See HPI


Endocrine:  No Symptoms Reported


Hematologic/Lymphatic:  No Symptoms Reported





Past Medical-Social-Family Hx


Patient Social History


Tobacco Use?:  No


Use of E-Cig and/or Vaping dev:  No


Substance use?:  No


Alcohol Use?:  No


Pt feels they are or have been:  No





Immunizations Up To Date


Tetanus Booster (TDap):  Unknown


First/Initial COVID19 Vaccinat:  


Second COVID19 Vaccination Joey:  


Third COVID19 Vaccination Date:  2021


COVID19 Vaccine :  Continuent





Seasonal Allergies


Seasonal Allergies:  No





Past Medical History


Surgeries:  Yes


Cardiac, CABG, Coronary Stent, Vascular Surgery


Respiratory:  Yes


COPD


Currently Using CPAP:  No


Currently Using BIPAP:  No


Cardiac:  Yes (STENTS, BYPASS )


Aneurysm, Cardiomyopathy, Coronary Artery Disease, Heart Attack, High 

Cholesterol, Hypertension


Neurological:  No


Stroke


Pregnant:  No


Reproductive Disorders:  No


Sexually Transmitted Disease:  No


HIV/AIDS:  No


Genitourinary:  No


Gastrointestinal:  Yes


Gastroesophageal Reflux


Musculoskeletal:  Yes


Arthritis, Chronic Back Pain


Endocrine:  Yes


Diabetes, Non-Insulin dep


HEENT:  No


Loss of Vision:  Right


Hearing Impairment:  Denies


Cancer:  No


Psychosocial:  No


Integumentary:  No


Blood Disorders:  No





Family Medical History





Patient reports no known family medical history.


Heart Disease, Diabetes, Hypertension





Physical Exam


Vital Signs





Vital Signs - First Documented




















Capillary Refill : Less Than 3 Seconds


Height, Weight, BMI


Height: '"


Weight: lbs. oz. kg; 22.00 BMI


Method:


General Appearance:  WD/WN, mild distress


HEENT:  PERRL/EOMI, normal ENT inspection, pharynx normal


Neck:  normal inspection


Respiratory:  lungs clear, normal breath sounds, no respiratory distress


Cardiovascular:  regular rate, rhythm, no edema, no murmur


Gastrointestinal:  non tender, soft; No distended


Extremities:  normal inspection, no pedal edema


Neurologic/Psychiatric:  alert, normal mood/affect, oriented x 3


Crainal Nerves:  PERRL, abnormal speech (Difficulty with word finding and 

articulation of speech), facial droop (Subtle left-sided facial droop)


Coordination/Gait:  normal finger to nose (Normal heel-to-shin)


Motor/Sensory:  no motor deficit, no sensory deficit


Skin:  normal color, warm/dry





Stroke


Onset of Symptoms


Date of Onset of Symptoms:  Oct 26, 2021


Time of Symptom Onset:  10:00


Onset of Symptoms:  Yes





NIH Stroke Scale Assessment





   Level of Consciousness: 0=Alert (0), Level of Consciousness-Questions: 

   1=Answers one question (1), LOC Commands: 0=Performs both tasks (0), Gaze: 

   Normal (0), Visual Fields: 0=No visual loss (0), Facial Movement (Facial 

   Paresis): 1=Minor paralysis (1), Motor Function-Arms Right: 0=No drift (0), 

   Motor Function-Arms Left: 0=No drift (0), Motor Function-Legs Right: 0=No 

   drift (0), Motor Function-Legs Left: 0=No drift (0), Limb Ataxia: 0=Absent 

   (0), Sensory: 0=Normal:no loss (0), Best Language: 1=Mild to moderat aphasia 

   (1), Dysarthria: 1=Mild to moderate loss (1), Extinction & Inattention: 0=No 

   abnormality (0), Total: 4





Stroke Thrombolytic Exclusion


Age 18 or Over:  Yes


Acute intenal hemorrhage:  No


History of CVA:  No


Uncontrolled Coagulation Defec:  No


Intracranial Hemorrhage:  No


Severe Hypertension:  No


GI or  Bleed:  No


Subarachnoid Hemorrhage:  No


Intracranial Neoplasm/Aneurysm:  No


Oral Anticoagulants:  No


Surgery or Trauma:  No


Puncture of Non-Compressible V:  No


Recent CPR:  No


Diabetic Hemorrhagic Retinopat:  No


Organ Biopsy:  No


Recent Obstetric Delivery:  No


Glucose:  No


Significant Hepatic Dysfunctio:  No


NIH Stoke Scale >22:  No


Bacterial Endocarditis:  No


Pericarditis:  No


Improving Symptoms:  No


Platelets:  No


TPA Contraindication:  Yes





IV - TPa Received


IV - TPa Procedure Performed?:  No





Progress/Results/Core Measures


Results/Orders


Lab Results





Laboratory Tests








Test


 10/26/21


16:14 10/26/21


16:33 Range/Units


 


 


White Blood Count


 7.8 


 


 4.3-11.0


10^3/uL


 


Red Blood Count


 4.70 


 


 3.80-5.11


10^6/uL


 


Hemoglobin 13.5   11.5-16.0  g/dL


 


Hematocrit 42   35-52  %


 


Mean Corpuscular Volume 90   80-99  fL


 


Mean Corpuscular Hemoglobin 29   25-34  pg


 


Mean Corpuscular Hemoglobin


Concent 32 


 


 32-36  g/dL





 


Red Cell Distribution Width 14.6 H  10.0-14.5  %


 


Platelet Count


 378 


 


 130-400


10^3/uL


 


Mean Platelet Volume 9.8   9.0-12.2  fL


 


Immature Granulocyte % (Auto) 0    %


 


Neutrophils (%) (Auto) 55   42-75  %


 


Lymphocytes (%) (Auto) 33   12-44  %


 


Monocytes (%) (Auto) 10   0-12  %


 


Eosinophils (%) (Auto) 2   0-10  %


 


Basophils (%) (Auto) 1   0-10  %


 


Neutrophils # (Auto)


 4.2 


 


 1.8-7.8


10^3/uL


 


Lymphocytes # (Auto)


 2.6 


 


 1.0-4.0


10^3/uL


 


Monocytes # (Auto)


 0.8 


 


 0.0-1.0


10^3/uL


 


Eosinophils # (Auto)


 0.1 


 


 0.0-0.3


10^3/uL


 


Basophils # (Auto)


 0.1 


 


 0.0-0.1


10^3/uL


 


Immature Granulocyte # (Auto)


 0.0 


 


 0.0-0.1


10^3/uL


 


Prothrombin Time 13.6   12.2-14.7  SEC


 


INR Comment 1.0   0.8-1.4  


 


Activated Partial


Thromboplast Time 38 H


 


 24-35  SEC





 


D-Dimer


 0.37 


 


 0.00-0.49


UG/ML


 


Sodium Level 136   135-145  MMOL/L


 


Potassium Level 4.2   3.6-5.0  MMOL/L


 


Chloride Level 101     MMOL/L


 


Carbon Dioxide Level 24   21-32  MMOL/L


 


Anion Gap 11   5-14  MMOL/L


 


Blood Urea Nitrogen 14   7-18  MG/DL


 


Creatinine


 0.74 


 


 0.60-1.30


MG/DL


 


Estimat Glomerular Filtration


Rate 77 


 


  





 


BUN/Creatinine Ratio 19    


 


Glucose Level 103     MG/DL


 


Glucometer 102     MG/DL


 


Calcium Level 9.8   8.5-10.1  MG/DL


 


Corrected Calcium 9.6   8.5-10.1  MG/DL


 


Total Bilirubin 0.4   0.1-1.0  MG/DL


 


Aspartate Amino Transf


(AST/SGOT) 15 


 


 5-34  U/L





 


Alanine Aminotransferase


(ALT/SGPT) 17 


 


 0-55  U/L





 


Alkaline Phosphatase 91     U/L


 


Troponin I < 0.028   <0.028  NG/ML


 


Total Protein 8.0   6.4-8.2  GM/DL


 


Albumin 4.3   3.2-4.5  GM/DL


 


Urine Color  YELLOW   


 


Urine Clarity  CLEAR   


 


Urine pH  6.0  5-9  


 


Urine Specific Gravity  1.020  1.016-1.022  


 


Urine Protein  NEGATIVE  NEGATIVE  


 


Urine Glucose (UA)  NEGATIVE  NEGATIVE  


 


Urine Ketones  NEGATIVE  NEGATIVE  


 


Urine Nitrite  NEGATIVE  NEGATIVE  


 


Urine Bilirubin  NEGATIVE  NEGATIVE  


 


Urine Urobilinogen  0.2  < = 1.0  MG/DL


 


Urine Leukocyte Esterase  TRACE H NEGATIVE  


 


Urine RBC (Auto)  NEGATIVE  NEGATIVE  


 


Urine RBC  NONE   /HPF


 


Urine WBC  0-2   /HPF


 


Urine Squamous Epithelial


Cells 


 RARE 


  /HPF





 


Urine Crystals  NONE   /LPF


 


Urine Bacteria  NEGATIVE   /HPF


 


Urine Casts  PRESENT   /LPF


 


Urine Hyaline Casts  RARE   /LPF


 


Urine Mucus  NEGATIVE   /LPF


 


Urine Culture Indicated  NO   








My Orders





Orders - YAMILA LILLY MD


Cbc With Automated Diff (10/26/21 16:22)


Protime With Inr (10/26/21 16:22)


Partial Thromboplastin Time (10/26/21 16:22)


Comprehensive Metabolic Panel (10/26/21 16:22)


Fibrin Degradation Products (10/26/21 16:22)


Troponin I (10/26/21 16:22)


Ua Culture If Indicated (10/26/21 16:22)


Chest 1 View, Ap/Pa Only (10/26/21 16:22)


Ekg Tracing (10/26/21 16:22)


Accucheck Stat ONCE (10/26/21 16:22)


Ed Iv/Invasive Line Start (10/26/21 16:22)


Ed Iv/Invasive Line Start (10/26/21 16:22)


Vital Signs Stroke Patient Q15M (10/26/21 16:22)


Ct Head Wo-R/O Stroke (10/26/21 16:22)


O2 (10/26/21 16:22)


Monitor-Rhythm Ecg Trace Only (10/26/21 16:22)


Lipid Panel (10/27/21 06:00)


Ct Angio Head/Neck (10/26/21 16:52)


Iohexol Injection (Omnipaque 350 Mg/Ml 1 (10/26/21 17:45)


Received Contrast (Hold Metformin- Contr (10/26/21 17:45)


Ns (Ivpb) (Sodium Chloride 0.9% Ivpb Bag (10/26/21 17:45)


General/Regular (10/26/21 Dinner)





Medications Given in ED





Vital Signs/I&O











 10/26/21 10/26/21 10/26/21 10/26/21





 16:10 16:10 16:10 16:15


 


Temp 36.4 36.4  


 


Pulse 68 68 68 69


 


Resp 20 20 20 


 


B/P (MAP) 195/122 (146) 195/122 195/122 183/81


 


Pulse Ox 100 100 100 


 


O2 Delivery Room Air   


 


    





 10/26/21 10/26/21 10/26/21 





 16:30 16:45 17:00 


 


Pulse 63 60 66 


 


B/P (MAP) 162/70 136/78 157/69 














Blood Pressure Mean:                    98











FSBG Bedside Testing


Finger Stick Blood Glucose:  102





Progress


Progress Note :  


Progress Note


NIH stroke score was 4.  Patient was not a TPA candidate due to greater than 4.5

hours from last known well time.  CT head and CT angiogram head and neck 

revealed no acute problems to treat aggressively today.  Of the vertebral artery

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  There seems to be 

retrograde flow from a large right vertebral artery that is compensating.  Case 

was discussed with Dr. Shane, stroke neurologist at [facility].  She agrees with the

decision to avoid thrombolytics and agrees that transfer to an interventional 

facility is not appropriate.  She recommends admission with MRI and 

echocardiogram.  Patient is agreeable to admission.  We discussed CODE STATUS 

and she reports a DNR request.  Patient reported improvement of the weakness in 

her left hand and arm which was not measurable by my exam.  Speech was somewhat 

waxing and waning.  She did have a mild chronic deficit in speech from prior 

stroke but this was much worse today.  Patient's daughter was also at bedside 

and agreed with these decisions also.


Initial ECG Impression Date:  Oct 26, 2021


Initial ECG Impression Time:  16:16


Initial ECG Rate:  64


Initial ECG Rhythm:  Normal Sinus


Comment


Sinus rhythm with no ST elevation or depression.  LVH noted.  No abnormal 

intervals.





Diagnostic Imaging





   Diagonstic Imaging:  Xray


   Plain Films/CT/US/NM/MRI:  chest


Comments


Chest x-ray viewed by me and report reviewed.  See report below





NAME:   TAYLOR CENTENO [facility] REC#:   [id number]


ACCOUNT#:   [id number]


PT STATUS:   REG ER


:   1945


PHYSICIAN:   YAMILA LILLY MD


ADMIT DATE:   10/26/21/ER


***Signed***


Date of Exam:10/26/21





CHEST 1 VIEW, AP/PA ONLY





Indication: Stroke





Upright portable chest shows normal heart size and vascularity.


The lungs are clear. There is no effusion or pneumothorax. There


are changes of prior CABG.





IMPRESSION: No acute abnormality is seen with no change from


2021.





Dictated by: 





  Dictated on workstation # [id number]





Dict:   10/26/21 1643


Trans:   10/26/21 1643


 [id number]





Interpreted by:     DIVINE ESPINAL MD


Electronically signed by: DIVINE ESPINAL MD 10/26/21 1643








   Diagonstic Imaging:  CT


   Plain Films/CT/US/NM/MRI:  head


Comments


CT head viewed by me and report reviewed.  See report below:





NAME:   TAYLOR CENTENO [facility] REC#:   [id number]


ACCOUNT#:   [id number]


PT STATUS:   REG ER


:   1945


PHYSICIAN:   YAMILA LILLY MD


ADMIT DATE:   10/26/21/ER


***Signed***


Date of Exam:10/26/21





CT HEAD WO-R/O STROKE





PROCEDURE: CT head wo r/o stroke.





TECHNIQUE: Multiple contiguous axial images were obtained through


the brain without the use of intravenous contrast. Auto Exposure


Controls were utilized during the CT exam to meet ALARA standards


for radiation dose reduction. 





INDICATION: Slurred speech and left arm weakness.





COMPARISON: Comparison is made with a head CT from 2021.





FINDINGS: Encephalomalacia in the left occipital lobe and left


frontal lobe is again noted and similar to prior exam. There is


an old lacunar infarct in the right thalamus. Sulci are


unremarkable. No sulcal effacement is seen. There is no midline


shift. No acute intra-axial or extra-axial hemorrhage is


detected. Cisterns are patent. There is some mucosal thickening


of the ethmoid air cells. Paranasal sinuses are otherwise clear.





IMPRESSION: Stable chronic changes when compared with exam from


2021. No acute intracranial process is detected.





Dictated by: 





  Dictated on workstation # [id number]





Dict:   10/26/21 1633


Trans:   10/26/21 1730


 [id number]





Interpreted by:     HERIBERTO DURAN MD


Electronically signed by: HERIBERTO DURAN MD 10/26/21 1730








   Diagonstic Imaging:  CT


   Plain Films/CT/US/NM/MRI:  other (CT angiogram head and neck viewed by me and

report reviewed.  See report below:)


Comments


NAME:   TAYLOR CENTENO


[facility] REC#:   [id number]


ACCOUNT#:   [id number]


PT STATUS:   REG ER


:   1945


PHYSICIAN:   YAMILA LILLY MD


ADMIT DATE:   10/26/21/ER


***Signed***


Date of Exam:10/26/21





CT ANGIO HEAD/NECK





PROCEDURE: CT angiography of the head and CT angiography of the


neck with and without contrast.





TECHNIQUE: Contiguous noncontrast images were obtained from the


skull base through the vertex. After intravenous contrast


administration, helical CT angiography of the neck was performed.


Source data was reformatted into 3D MIP projections. Delayed post


contrast acquisition was also obtained. Auto Exposure Controls


were utilized during the CT exam to meet ALARA standards for


radiation dose reduction. 





INDICATION: Slurred speech. Left arm weakness.





Comparison is made with a prior CT of the head performed earlier


in the same day.





There is mild atherosclerotic plaquing evident within the


visualized portion of the arch. There is no significant stenosis


at the origins of the great vessels arising from the arch. There


is mild atherosclerotic narrowing of the right subclavian. The


origin of the right vertebral artery is patent. The right


vertebral artery appears to be dominant given the size of the


transverse foramen. The origin of the left vertebral artery is


occluded with only few short segment regions of the left


vertebral artery within the neck demonstrating contrast


opacification. There is flow within the intracranial left


vertebral artery.





The common carotid arteries within the neck demonstrate no


high-grade stenosis. There is a stent at the right carotid


bifurcation which may have some internal hyperplasia but does


remain patent. There is less than 50% stenosis at the left


bifurcation. There is no ICA dissection.





The right vertebral artery throughout the neck is without


evidence of dissection. There is some mild atherosclerotic


plaquing within its proximal cervical aspect.





Intracranially, there is moderate atherosclerotic disease within


both of the carotid siphons. There is patent flow to the level of


the carotid terminus bilaterally. There is flow within the M1


segment of both of the middle cerebral arteries. There is no


definable M2 branch occlusion. The anterior cerebral arteries


also appear patent.





Within the posterior circulation, the dominant right vertebral


artery demonstrates no  significant stenosis. There is mild


atherosclerotic plaquing. There is flow within the left vertebral


artery which may be retrograde. Both of the posterior inferior


cerebellar arteries appear patent. The basilar is small. This


appears to be secondary to a fetal origin of the right PCA. The


left PCA demonstrates advanced atherosclerotic disease within the


proximal P2 segment and diminutive flow distally that appears to


be secondary to the patient's prior large left PCA infarct.





There are no findings of intracranial aneurysm formation. The


dural venous sinuses are patent.





There is no acute soft tissue abnormality evident within the


neck.





The lung apices are clear. Cervical spine demonstrates multilevel


degenerative disc disease and facet arthropathy with a


anterolisthesis of C4 on C5. There is no acute osseous


abnormality. There is high-grade bilateral foraminal stenosis at


the C4-C5 and C5-C6 levels.





IMPRESSION:


1. Intracranially, there is intracranial atherosclerotic disease


but no findings of a large vessel occlusion or intraluminal


thrombosis. There is diminutive flow within the left PCA related


to the patient's remote left PCA territory infarct.


2. Flow within the intracranial left vertebral artery may be


retrograde as there is occlusion of the proximal aspect of the


left vertebral artery within the neck.


3. No findings of intracranial aneurysm formation.


4. The dural venous sinuses are patent.


5. No pathologic intracranial enhancement


6. Previous right carotid stenting. The stent remains patent.


There is less than 50% stenosis of the left bifurcation. The


right vertebral artery within the neck widely patent.


7. Cervical degenerative disc disease and facet arthropathy.





Dictated by: 





  Dictated on workstation # [id number]





Dict:   10/26/21 1803


Trans:   10/26/21 1817


[facility] [id number]





Interpreted by:     JAOSN VDIALES MD


Electronically signed by: JASON VIDALES MD 10/26/21 1817


CT Read Date:  Oct 26, 2021


CT Read Time:  16:50


CT Results/Progress Notes


No acute changes





Departure


Impression





   Primary Impression:  


   Dysarthria


   Additional Impression:  


   Dysphasia


Disposition:   ADMITTED AS INPATIENT


Condition:  Improved





Admissions


Decision to Admit Reason:  Admit from ER (General)


Decision to Admit/Date:  Oct 26, 2021


Time/Decision to Admit Time:  16:20





Departure-Patient Inst.


Referrals:  


ANDREY BAH DO (PCP/Family)


Primary Care Physician











YAMILA LILLY MD        Oct 26, 2021 18:03

## 2021-10-26 NOTE — DIAGNOSTIC IMAGING REPORT
PROCEDURE: CT angiography of the head and CT angiography of the

neck with and without contrast.



TECHNIQUE: Contiguous noncontrast images were obtained from the

skull base through the vertex. After intravenous contrast

administration, helical CT angiography of the neck was performed.

Source data was reformatted into 3D MIP projections. Delayed post

contrast acquisition was also obtained. Auto Exposure Controls

were utilized during the CT exam to meet ALARA standards for

radiation dose reduction. 



INDICATION: Slurred speech. Left arm weakness.



Comparison is made with a prior CT of the head performed earlier

in the same day.



There is mild atherosclerotic plaquing evident within the

visualized portion of the arch. There is no significant stenosis

at the origins of the great vessels arising from the arch. There

is mild atherosclerotic narrowing of the right subclavian. The

origin of the right vertebral artery is patent. The right

vertebral artery appears to be dominant given the size of the

transverse foramen. The origin of the left vertebral artery is

occluded with only few short segment regions of the left

vertebral artery within the neck demonstrating contrast

opacification. There is flow within the intracranial left

vertebral artery.



The common carotid arteries within the neck demonstrate no

high-grade stenosis. There is a stent at the right carotid

bifurcation which may have some internal hyperplasia but does

remain patent. There is less than 50% stenosis at the left

bifurcation. There is no ICA dissection.



The right vertebral artery throughout the neck is without

evidence of dissection. There is some mild atherosclerotic

plaquing within its proximal cervical aspect.



Intracranially, there is moderate atherosclerotic disease within

both of the carotid siphons. There is patent flow to the level of

the carotid terminus bilaterally. There is flow within the M1

segment of both of the middle cerebral arteries. There is no

definable M2 branch occlusion. The anterior cerebral arteries

also appear patent.



Within the posterior circulation, the dominant right vertebral

artery demonstrates no  significant stenosis. There is mild

atherosclerotic plaquing. There is flow within the left vertebral

artery which may be retrograde. Both of the posterior inferior

cerebellar arteries appear patent. The basilar is small. This

appears to be secondary to a fetal origin of the right PCA. The

left PCA demonstrates advanced atherosclerotic disease within the

proximal P2 segment and diminutive flow distally that appears to

be secondary to the patient's prior large left PCA infarct.



There are no findings of intracranial aneurysm formation. The

dural venous sinuses are patent.



There is no acute soft tissue abnormality evident within the

neck.



The lung apices are clear. Cervical spine demonstrates multilevel

degenerative disc disease and facet arthropathy with a

anterolisthesis of C4 on C5. There is no acute osseous

abnormality. There is high-grade bilateral foraminal stenosis at

the C4-C5 and C5-C6 levels.



IMPRESSION:

1. Intracranially, there is intracranial atherosclerotic disease

but no findings of a large vessel occlusion or intraluminal

thrombosis. There is diminutive flow within the left PCA related

to the patient's remote left PCA territory infarct.

2. Flow within the intracranial left vertebral artery may be

retrograde as there is occlusion of the proximal aspect of the

left vertebral artery within the neck.

3. No findings of intracranial aneurysm formation.

4. The dural venous sinuses are patent.

5. No pathologic intracranial enhancement

6. Previous right carotid stenting. The stent remains patent.

There is less than 50% stenosis of the left bifurcation. The

right vertebral artery within the neck widely patent.

7. Cervical degenerative disc disease and facet arthropathy.



Dictated by: 



  Dictated on workstation # [id number]

## 2021-10-26 NOTE — DIAGNOSTIC IMAGING REPORT
Indication: Stroke



Upright portable chest shows normal heart size and vascularity.

The lungs are clear. There is no effusion or pneumothorax. There

are changes of prior CABG.



IMPRESSION: No acute abnormality is seen with no change from

08/30/2021.



Dictated by: 



  Dictated on workstation # [id number]

## 2021-10-26 NOTE — XMS REPORT
Clinical Summary

                             Created on: 10/26/2021



Lupe Williamson

External Reference #: [id number]

: 1945

Sex: Female



Demographics





                          Address                   [address] [place]  [address]

 

                          Home Phone                [phone]

 

                          Preferred Language        English

 

                          Marital Status            Single

 

                          Ep[employer]opalian Affiliation     Unknown

 

                          Race                      White

 

                          Ethnic Group              Not  or 





Author





                          Author                    [facility]

 

                          Address                   Unknown

 

                          Phone                     Unavailable







Support





                Name            Relationship    Address         Phone

 

                José Miguel Williamson    EC[employer]            Unknown         [phone]

 

                Monik Chahal   [employer]            Unknown         [phone]

 

                George Williamson  EC[employer]            Unknown         [phone]







Care Team Providers





                    Care Team Member Name Role                Phone

 

                    Vivi Bales FREDERICK  PP                  [phone]







Allergies

No known active allergies



Medications





                          End Date                  Status



              Medication   Sig          Dispensed    Refills      Start  



                                         Date  

 

                                                    Active



              atorvastatin (LIPITOR) 40  Take 1 tablet  30 tablet    0          

  10/14/202  



                     mg tablet           (40 mg total)       0  



                                         by mouth     



                                         every night.     







Active Problems





 



                           Problem                   Noted Date

 

 



                           DVT, lower extremity, distal, acute, right  10/21/202

0

 

 



                           Respiratory failure       10/14/2020

 

 



                           Hypotension               10/12/2020

 

 



                           Acute respiratory failure with hypoxia  10/12/2020

 

 



                           CVA (cerebral vascular accident)  10/12/2020

 

 



                           Acute blood loss anemia   10/04/2020

 

 



                           Leukocytosis              10/04/2020

 

 



                           Coronary artery disease involving native coronary art

josep of native heart  

10/03/2020



                                         with unstable angina pectoris 

 

 



                           NSTEMI (non-ST elevated myocardial infarction)  10/03

/2020

 

 



                           Acute on chronic HFrEF (heart failure with reduced ej

ection fraction)  

10/03/2020

 

 



                           Ischemic cardiomyopathy   10/03/2020

 

 



                           Tobacco abuse             10/03/2020







Social History





                                        Date



                 Tobacco Use     Types           Packs/Day       Years Used 

 

                                         



                     Current Some Day Smoker  Cigarettes          0.5  







 



                           Sex Assigned at Birth     Date Recorded

 

 



                                         Not on file 







                                        Industry



                           Job Start Date            Occupation 

 

                                        Not on file



                           Not on file               Not on file 







Last Filed Vital Signs





                    Reading             Time Taken          Comments



                                         Vital Sign   

 

                    134/58              10/25/2020 12:00 PM CDT  



                                         Blood Pressure   

 

                    88                  10/25/2020 12:00 PM CDT  



                                         Pulse   

 

                    36 C (96.8 F)   10/25/2020 12:00 PM CDT  



                                         Temperature   

 

                    18                  10/25/2020 12:00 PM CDT  



                                         Respiratory Rate   

 

                    100%                10/25/2020 12:00 PM CDT  



                                         Oxygen Saturation   

 

                    -                   -                    



                                         Inhaled Oxygen   



                                         Concentration   

 

                    60.1 kg (132 lb 7.9 oz) 10/25/2020  4:00 AM CDT  



                                         Weight   

 

                    147.3 cm (4' 9.99") 10/09/2020 10:36 AM CDT  



                                         Height   

 

                    27.7                10/09/2020 10:36 AM CDT  



                                         Body Mass Index   







Plan of Treatment





   



                 Health Maintenance  Due Date        Last Done       Comments

 

   



                           CT Colonography           1945  

 

   



                           Cologuard                 1945  

 

   



                           Colonoscopy               1945  

 

   



                           Colorectal Cancer         1945  



                                         Screening   

 

   



                           Lipid Panel               1945  

 

   



                           Medicare Wellness         1945  

 

   



                           MMR Vaccines (1 of 1 -    1946  



                                         Standard series)   

 

   



                           Varicella Vaccines (1 of  1947  



                                         2 - 2-dose childhood   



                                         series)   

 

   



                           Pneumococcal 65+ Yr (1 of  1952  



                                         2 - PPSV23)   

 

   



                           COVID-19 Vaccine (1)      1958  

 

   



                           Depression Screening      1958  

 

   



                           DTaP,Tdap,and Td Vaccines  1965  



                                         (1 - Tdap)   

 

   



                           FOBT/FIT                  1991  

 

   



                           Sigmoidoscopy             1991  

 

   



                           Osteoporosis Screening    1996  

 

   



                           Zoster Vaccines (1 of 2)  1996  

 

   



                           Glaucoma Screening 65+ Yr  2011  

 

   



                           Influenza Vaccine (#1)    2021  

 

   



                     HIB Vaccines        Aged Out            No longer eligible 

based on patient's age to



                                         complete this topic

 

   



                     HPV Vaccines        Aged Out            No longer eligible 

based on patient's age to



                                         complete this topic

 

   



                     Hepatitis A Vaccines  Aged Out            No longer eligibl

e based on patient's age to



                                         complete this topic

 

   



                     Hepatitis B Vaccines  Aged Out            No longer eligibl

e based on patient's age to



                                         complete this topic

 

   



                     IPV Vaccines        Aged Out            No longer eligible 

based on patient's age to



                                         complete this topic

 

   



                     Meningococcal Vaccine  Aged Out            No longer eligib

le based on patient's age to



                                         complete this topic







Implants





                    Device Identifier   Shelf Expiration Date Model / Serial / L

ot



                 Implanted       Type            Area            Manufactur   



                                         er   

 

                                        2022          [id number] /

[id number] /





                     Device Closure Cordis Mynxgrip Od5  Closure             CAR

DINAL   



                           Fr 10 Ml Vascular Balloon Catheter    HEALTH   



                                         Integrate Sealant Lock Syringe      



                                         Atraumatic Tip Sterile Latex Free      



                                         Disposable Gray - [id number] -      



                                         [id number]      



                                         Implanted: Qty: 1 on 10/23/2020 by     

 



                                         Bean Ernst MD at [facility]      







                    Device Identifier   Shelf Expiration Date Model / Serial / L

ot



                 Explanted       Type            Area            Manufactur   



                                         er   

 

                                        2024          [id number] /

NA /

PPBCQT



                 Suture Nonabsorbable Temporary  Wire            N/A: Heart     

 J and J   



                           Pacing Wire Stainless Steel 2-0    [employer]   



                                         Bb-1 Sks-3 L24 In 2 Arm Braid Dark     

 



                                         Blue Light Blue - Sna - [id number]     

 



                                         Explanted: Qty: 1 on 10/04/2020 at     

 



                                         [facility]      







Results

Not on filefrom Last 3 Months



Insurance





                                        Type



            Payer      Benefit    Subscriber ID  Effective  Phone      Address 



                           Plan /                    Dates   



                                         Group     

 

                                         



              MEDICARE     MEDICARE     [id number]  [id number]    



                     PART A AND          resent              [employer] 



                           [address] 



                                         [address] 



                                         [place] 



                                         [place], [place] 



                                         [address] 







     



            Guarantor Name  Account    Relation to  Date of    Phone      Cedric

g Address



                     Type                Patient             Birth  

 

     



            Lupe Williamson  Personal/F  Self       1945  [phone]  [address]               (Home)              [place] [address]







Advance Directives





                          Patient Representative    Explanation



                           Type                      Date Recorded  

 

                                                     



                                         Advance Directives   



                                         and Living Will   

 

                                                     



                                         Power of    











                          Date Inactivated          Comments



                           Code Status               Date Activated  

 

                          10/25/2020  2:04 PM        



                           Full Code                 10/25/2020 10:13 AM  







                                                     

 

                          10/25/2020 10:13 AM        



                           Full Code                 10/21/2020  5:37 PM  







                                                     

 

                          10/21/2020  2:54 PM        



                           Full Code                 10/14/2020  9:08 PM  







                                                     

 

                          10/14/2020  8:44 PM        



                           Full Code                 10/2/2020 11:48 PM  







Care Teams





                          Start Date                End Date



                     Team Member         Relationship        Specialty  

 

                          10/2/20                    



                     Vivi Bales FNP  PCP - General       Family  



                           [id number] [address] [id number] [phone] (Work)    



                                         [phone] (Fax)

## 2021-10-27 VITALS — SYSTOLIC BLOOD PRESSURE: 156 MMHG | DIASTOLIC BLOOD PRESSURE: 77 MMHG

## 2021-10-27 VITALS — SYSTOLIC BLOOD PRESSURE: 133 MMHG | DIASTOLIC BLOOD PRESSURE: 75 MMHG

## 2021-10-27 VITALS — DIASTOLIC BLOOD PRESSURE: 64 MMHG | SYSTOLIC BLOOD PRESSURE: 138 MMHG

## 2021-10-27 VITALS — SYSTOLIC BLOOD PRESSURE: 153 MMHG | DIASTOLIC BLOOD PRESSURE: 73 MMHG

## 2021-10-27 VITALS — SYSTOLIC BLOOD PRESSURE: 146 MMHG | DIASTOLIC BLOOD PRESSURE: 78 MMHG

## 2021-10-27 VITALS — SYSTOLIC BLOOD PRESSURE: 146 MMHG | DIASTOLIC BLOOD PRESSURE: 64 MMHG

## 2021-10-27 VITALS — SYSTOLIC BLOOD PRESSURE: 155 MMHG | DIASTOLIC BLOOD PRESSURE: 86 MMHG

## 2021-10-27 VITALS — SYSTOLIC BLOOD PRESSURE: 142 MMHG | DIASTOLIC BLOOD PRESSURE: 65 MMHG

## 2021-10-27 LAB
ALBUMIN SERPL-MCNC: 3.7 GM/DL (ref 3.2–4.5)
ALP SERPL-CCNC: 75 U/L (ref 40–136)
ALT SERPL-CCNC: 13 U/L (ref 0–55)
BASOPHILS # BLD AUTO: 0 10^3/UL (ref 0–0.1)
BASOPHILS NFR BLD AUTO: 1 % (ref 0–10)
BILIRUB SERPL-MCNC: 0.4 MG/DL (ref 0.1–1)
BUN/CREAT SERPL: 17
CALCIUM SERPL-MCNC: 9.2 MG/DL (ref 8.5–10.1)
CHLORIDE SERPL-SCNC: 104 MMOL/L (ref 98–107)
CHOLEST SERPL-MCNC: 150 MG/DL (ref ?–200)
CO2 SERPL-SCNC: 22 MMOL/L (ref 21–32)
CREAT SERPL-MCNC: 0.63 MG/DL (ref 0.6–1.3)
EOSINOPHIL # BLD AUTO: 0.1 10^3/UL (ref 0–0.3)
EOSINOPHIL NFR BLD AUTO: 1 % (ref 0–10)
GFR SERPLBLD BASED ON 1.73 SQ M-ARVRAT: 92 ML/MIN
GLUCOSE SERPL-MCNC: 112 MG/DL (ref 70–105)
HCT VFR BLD CALC: 40 % (ref 35–52)
HDLC SERPL-MCNC: 51 MG/DL (ref 40–60)
HGB BLD-MCNC: 12.9 G/DL (ref 11.5–16)
LYMPHOCYTES # BLD AUTO: 1.9 10^3/UL (ref 1–4)
LYMPHOCYTES NFR BLD AUTO: 30 % (ref 12–44)
MANUAL DIFFERENTIAL PERFORMED BLD QL: NO
MCH RBC QN AUTO: 29 PG (ref 25–34)
MCHC RBC AUTO-ENTMCNC: 32 G/DL (ref 32–36)
MCV RBC AUTO: 89 FL (ref 80–99)
MONOCYTES # BLD AUTO: 0.6 10^3/UL (ref 0–1)
MONOCYTES NFR BLD AUTO: 10 % (ref 0–12)
NEUTROPHILS # BLD AUTO: 3.7 10^3/UL (ref 1.8–7.8)
NEUTROPHILS NFR BLD AUTO: 58 % (ref 42–75)
PLATELET # BLD: 302 10^3/UL (ref 130–400)
PMV BLD AUTO: 9.8 FL (ref 9–12.2)
POTASSIUM SERPL-SCNC: 3.8 MMOL/L (ref 3.6–5)
PROT SERPL-MCNC: 6.8 GM/DL (ref 6.4–8.2)
SODIUM SERPL-SCNC: 137 MMOL/L (ref 135–145)
TRIGL SERPL-MCNC: 73 MG/DL (ref ?–150)
VLDLC SERPL CALC-MCNC: 15 MG/DL (ref 5–40)
WBC # BLD AUTO: 6.3 10^3/UL (ref 4.3–11)

## 2021-10-27 RX ADMIN — INSULIN ASPART SCH UNIT: 100 INJECTION, SOLUTION INTRAVENOUS; SUBCUTANEOUS at 21:25

## 2021-10-27 RX ADMIN — FLUOXETINE SCH MG: 20 CAPSULE ORAL at 20:18

## 2021-10-27 RX ADMIN — CLOPIDOGREL BISULFATE SCH MG: 75 TABLET, FILM COATED ORAL at 09:05

## 2021-10-27 RX ADMIN — DOCUSATE SODIUM SCH MG: 100 CAPSULE ORAL at 09:05

## 2021-10-27 RX ADMIN — ASPIRIN SCH MG: 325 TABLET, COATED ORAL at 09:05

## 2021-10-27 NOTE — OCCUPATIONAL THERAPY EVAL
OT Evaluation-General/PLF


Medical Diagnosis


Admission Date


Oct 26, 2021 at 19:01


Medical Diagnosis:  CVA


Onset Date:  Oct 26, 2021





Therapy Diagnosis


Therapy Diagnosis:  impaired balance





Precautions


Precautions/Isolations:  Aspiration, Fall Prevention, Standard Precautions





Referral


Physician:  Nancy Petit DO


Referral Reason:  Evaluation/Treatment





Medical History


Pertinent Medical History:  CABG, CAD, COPD, CVA, DM, PVD


Current History


Pt presented to ER yesterday afternoon with complaints of L side num

bness/weakness and difficulty speaking. Per patient, she lives in assisted 

living. She is indep with adls. Facility provides assist with meals and 

transportation. She owns a walker and a cane but only uses a cane when out in 

the community. She has residual deficits from old stroke including dysarthria, 

expressive aphasia, and FM control/coordination.





Social History


Home:  Assisted Living


Current Living Status:  Alone


Entry Into Home:  Level Entry





ADL-Prior Level of Function


SCALE: Activities may be completed with or without assistive devices.





6-Indepedent-patient completes the activity by him/herself with no assistance 

from a helper.


5-Set-up or Clean-up Assistance-helper sets up or cleans up; patient completes 

activity. [place] assists only prior to or  


    following the activity.


4-Supervision or Touching Assistance-helper provides verbal cues and/or 

touching/steadying and/or contact guard assistance as patient completes 

activity. Assistance may be provided   


    throughout the activity or intermittently.


3-Partial/Moderate Assistance-helper does LESS THAN HALF the effort. [place] 

lifts, holds or supports trunk or limbs, but provides less than half the effort.


2-Substantial/Maximal Assistance-helper does MORE THAN HALF the effort. [place] 

lifts or holds trunk or limbs and provides more than half the effort.


6-[id number]-hdvbyy does ALL the effort. Patient does none of the effort to 

complete the activity. Or, the assistance of 2 or more helpers is required for 

the patient to complete the  


    activity.


If activity was not attempted, code reason:


7-Patient Refused.


9-Not Applicable-not attempted and the patient did not perform the activity 

before the current illness, exacerbation or injury.


10-Not Attempted due to Environmental Limitations-(lack of equipment, weather 

restraints, etc.).


88-Not Attempted due to Medical Conditions or Safety Concerns.


Self Care:  Independent


Functional Cognition:  Needed Some Help


DME/Equipment:  Bath Chair, Grab Bars, Shower


Drive Self:  No





OT Current Status


Subjective


Pt denies pain, agreeable to Eval.





Appearance


Returned to supine in bed, all needs within reach, dtr in room.





Mental Status/Objective


Patient Orientation:  Person, Place, Situation


Attachments:  IV, Telemetry





Current


Upper Extremity ROM


WFL, extra time for finger opposition.


Upper Extremity Strength


3+/5 throughout. Pt with residual weakness


Pt reports impaired R peripheral vision.





ADL-Treatment


Upper Body Dressing (QC):  4


Lower Body Dressing (QC):  4


On/Off Footwear (QC):  4


Toileting Hygiene (QC):  4


Supine<>sit: SBA. Pt able to don/doff see socks with use of cross over method. 

Sit<>Stand: CGA for initial unsteadiness. She ambulated to/from bathroom with 

CGA, no assistive device. Able to lower/stand from toilet with CGA and use of 

grab bar. No assist needed for clothing management or aurelio care. Discussed using

walker more frequently as pt may be more steady with an assistive device. Pt in 

agreement with suggestion.





Education


OT Patient Education:  Correct positioning, Energy conservation, Modified ADL 

techniques, Progress toward Goal/Update tx plan, Purpose of tx/functional 

activities, Reviewed precautions, Rehab process, Safety issues, Transfer 

techniques


Teaching Recipient:  Patient


Teaching Methods:  Demonstration, Discussion


Response to Teaching:  Verbalize Understanding, Return Demonstration, 

Reinforcement Needed





OT Long Term Goals


Long Term Goals


Time Frame:  Nov 5, 2021


Oral Hygiene (QC):  5


Toileting Hygiene (QC):  6


Upper Body Dressing (QC):  5


Lower Body Dressing (QC):  5


On/Off Footwear (QC):  5


1=Demonstrate adherence to instructed precautions during ADL tasks.


2=Patient will verbalize/demonstrate understanding of assistive 

devices/modifications for ADL.


3=Patient will improve strength/tolerance for activity to enable patient to 

perform ADL's.





OT Education/Plan


Problem List/Assessment


Assessment:  Decreased Activ Tolerance, Decreased Safety Aware, Decreased UE 

Strength, Impaired Coordination, Impaired Funct Balance, Impaired I ADL's





Discharge Recommendations


Plan/Recommendations:  Continue POC


Comment


continue to assess. Possible home health pending progress





Treatment Plan/Plan of Care


Treatment,Training & Education:  Yes


Patient would benefit from OT for education, treatment and training to promote i

ndependence in ADL's, mobility, safety and/or upper extremity function for 

ADL's.


Plan of Care:  ADL Retraining, Functional Mobility, UE Funct Exercise/Act, UE 

Neuromus Re-Ed/Coord, Visual/Perceptual Retrain


Treatment Duration:  Nov 5, 2021


Frequency:  5 times per week


Estimated Hrs Per Day:  .25 hour per day


Agreement:  Yes


Rehab Potential:  Fair





Time/GCodes


Start Time:  14:07


Stop Time:  14:27


Total Time Billed (hr/min):  20


Billed Treatment Time


1 visit,


Margot Mazariegos OT                Oct 27, 2021 15:02

## 2021-10-27 NOTE — PHYSICAL THERAPY EVALUATION
PT Evaluation-General


Medical Diagnosis


Admission Date


Oct 26, 2021 at 19:01


Medical Diagnosis:  CVA


Onset Date:  Oct 26, 2021





Therapy Diagnosis


Therapy Diagnosis:  impaired mobility, strength, endurance, balance





Precautions


Precautions/Isolations:  Fall Prevention, Standard Precautions





Referral


Physician:  Nancy Petit DO


Reason for Referral:  Evaluation/Treatment





Medical History


Pertinent Medical History:  CABG, CAD, COPD, CVA, DM, PVD


Additional Medical History


Past Medical History


Surgeries:  Yes


Cardiac, CABG, Coronary Stent, Vascular Surgery


Respiratory:  Yes


COPD


Currently Using CPAP:  No


Currently Using BIPAP:  No


Cardiac:  Yes (STENTS, BYPASS )


Aneurysm, Cardiomyopathy, Coronary Artery Disease, Heart Attack, High 

Cholesterol, Hypertension


Neurological:  No


Stroke


Pregnant:  No


Reproductive Disorders:  No


Sexually Transmitted Disease:  No


HIV/AIDS:  No


Genitourinary:  No


Gastrointestinal:  Yes


Gastroesophageal Reflux


Musculoskeletal:  Yes


Arthritis, Chronic Back Pain


Endocrine:  Yes


Diabetes, Non-Insulin dep


Reviewed History:  Yes





Social History


Home:  Apartment ([place])


Current Living Status:  Alone


Entry Into Home:  Level Entry





Prior


Prior Level of Function


SCALE: Activities may be completed with or without assistive devices.





6-Indepedent-patient completes the activity by him/herself with no assistance 

from a helper.


5-Set-up or Clean-up Assistance-helper sets up or cleans up; patient completes 

activity. [place] assists only prior to or  


    following the activity.


4-Supervision or Touching Assistance-helper provides verbal cues and/or 

touching/steadying and/or contact guard assistance as patient completes 

activity. Assistance may be provided   


    throughout the activity or intermittently.


3-Partial/Moderate Assistance-helper does LESS THAN HALF the effort. [place] 

lifts, holds or supports trunk or limbs, but provides less than half the effort.


2-Substantial/Maximal Assistance-helper does MORE THAN HALF the effort. [place] 

lifts or holds trunk or limbs and provides more than half the effort.


8-Rvjxikxlq-kfklke does ALL the effort. Patient does none of the effort to 

complete the activity. Or, the assistance of 2 or more helpers is required for 

the patient to complete the  


    activity.


If activity was not attempted, code reason:


7-Patient Refused.


9-Not Applicable-not attempted and the patient did not perform the activity befo

re the current illness, exacerbation or injury.


10-Not Attempted due to Environmental Limitations-(lack of equipment, weather re

straints, etc.).


88-Not Attempted due to Medical Conditions or Safety Concerns.


Bed Mobility:  6


Transfers (B,C,W/C):  6


Gait:  6


Indoor Mobility (Ambulation):  Independent


Prior Devices Use:  Walker





PT Evaluation-Current


Subjective


Patient in bed pre tx, agrees to PT, has no complaints of pain.





Pt/Family Goals


to be able to go back home





Objective


Patient Orientation:  Person, Place, Situation





ROM/Strength


ROM Lower Extremities


WNL


Strength Lower Extremities


LLE (hip flexion 3+/5, knee flexion 4+/5, knee extension 4+/5, dorsiflexion 

4+/5), RLE (hip flexion 3+/5, knee flexion 4+/5, knee extension 4+/5, 

dorsiflexion 4+/5)





Neuromuscular


(Tone, Coordination, Reflexes)


impaired peripheral vision on the right side





Sensory


Vision:  Wears Glasses


Hearing:  Functional


Sensation Right Lower Extremit:  Intact


Sensation Left Lower Extremity:  Intact





Transfers


Roll Left to Right (QC):  6


Sit to Lying (QC):  4


Lying to Sitting/Side of Bed(Q:  4


Sit to Stand (QC):  3





Gait


Does the Patient Walk?:  Yes


Mode of Locomotion:  Walk


Anticipated Mode of Locomotion:  Walk


Walk 10 feet (QC):  4


Distance:  30'


Gait Assistive Device:  Handheld Assist


Comments/Gait Description


Patient was able to maintain balance but very unsteady, would benefit from the 

use of a walker next time, short choppy steps





Balance


Sitting Static:  Normal


Sitting Dynamic:  Normal


Standing Static:  Poor


 Standing Dynamic:  Poor





Treatment


supine bilateral LE ex x20 (AP, HS)





Assessment/Needs


Patient  has impaired mobility, strength, endurance, balance.  Patient in bed 

post tx with nurse call, phone, tray, all needs met.  Patient is very unsteady 

with ambulation, needs assist.


Rehab Potential:  Fair





PT Long Term Goals


Long Term Goals


PT Long Term Goals Time Frame:  Nov 3, 2021


Roll Left & Right (QC):  6


Sit to Lying (QC):  6


Lying-Sitting on Side/Bed(QC):  6


Sit to Stand (QC):  4


Chair/Bed-to-Chair Xfer(QC):  4


Walk 10 feet (QC):  4


Walk 50ft with 2 Turns (QC):  4


Walk 150 ft (QC):  4





PT Plan


Problem List


Problem List:  Activity Tolerance, Functional Strength, Safety, Balance, Gait, 

Transfer, Bed Mobility, ROM





Treatment/Plan


Treatment Plan:  Continue Plan of Care


Treatment Plan:  Bed Mobility, Education, Functional Activity Nona, Functional 

Strength, Gait, Safety, Therapeutic Exercise, Transfers


Treatment Duration:  Nov 3, 2021


Frequency:  6 times per week


Estimated Hrs Per Day:  .25 hour per day


Patient and/or Family Agrees t:  Yes





Safety Risks/Education


Patient Education:  Gait Training, Transfer Techniques, Correct Positioning, 

Safety Issues


Teaching Recipient:  Patient


Teaching Methods:  Demonstration, Discussion


Response to Teaching:  Reinforcement Needed





Discharge Recommendations


Plan


Patient will perform bed mobility and transfer training, balance and endurance 

training, functional strengthening, gait training, and education to improve 

functional mobility and independence at home.


Therapy Discharge Recommendati:  Scheduled Assistance, Assisted Living, Post 

Acute PT





Time/GCodes


Time In:  0822


Time Out:  0836


Total Billed Treatment Time:  14


Total Billed Treatment


1 visit


MELODY PAIGE PT                Oct 27, 2021 09:13

## 2021-10-27 NOTE — DIAGNOSTIC IMAGING REPORT
CLINICAL INDICATION: 

Possible stroke. 24 hour TPA.



EXAM: 

MRI of the brain performed without IV contrast. Sequences include

axial DWI, ADC map, coronal gradient echo, axial T2, axial FLAIR,

axial T1, and sagittal T1.



COMPARISON: 

CT angiogram of the head/neck dated 10/26/2021.



FINDINGS:

 There is a 4 mm punctate area of diffusion restriction involving

the high posterior left frontal lobe region. There is no other

area of acute cerebral infarct. There is a chronic cerebral

infarct involving the medial left temporal lobe and left

occipital lobe region with cortical high T1 signal, likely

related to cortical laminar necrosis.



There is a small area of chronic cerebral infarct involving the

lateral left frontal lobe and insular region.



There are multiple focal areas of high T2 signal white matter

changes involving both cerebral hemispheres and periventricular

regions. There is no hydrocephalus, brain herniation, or midline

shift. There is no intracranial hemorrhage. The basal cisterns

are unremarkable. The [place] vascular structures show

no gross abnormality as visualized. The pituitary gland, sella,

and suprasellar regions are unremarkable as visualized.



There is mild mucosal thickening involving the ethmoid sinus.

There is a small mucus retention cyst involving the left

maxillary sinus.



There is a small amount of fluid involving the right mastoid air

cells.



IMPRESSION:

1: There is a punctate area of diffusion restriction involving

the high posterior left frontal lobe region, likely representing

a focal area of acute/subacute infarct.



2: Otherwise, there is no other acute intracranial process. There

is no intra-cranial hemorrhage, brain herniation, or midline

shift.



3: There is a chronic infarct involving the left medial

temporal/left occipital lobe region and lateral left frontal lobe

region.



Dictated by: 



  Dictated on workstation # [id number]

## 2021-10-27 NOTE — HISTORY & PHYSICAL
GALATIERRA MED STUDENT 10/27/21 1251:


History of Present Illness


History of Present Illness


Reason for visit/HPI


CC: Stroke-like symptoms





HPI: Pt presented to ER yesterday afternoon with complaints of L side 

numbness/weakness and difficulty speaking. She is unable to recall the events 

yesterday. Per ER, stroke protocol was activated but she was past the window for

tPA therapy. She history of CVA, 3x CABG, CAD, L PCA infarct. She has residual 

deficits including slight R facial droop, dysarthria, expressive aphasia. She 

feels well this morning with no complaints of chest pain, SOB, N/V, abd pain, 

numbness/tingling, motor weakness, sensory loss. She is alert, oriented to self,

disoriented to place/time.


Date of Admission


Oct 26, 2021 at 19:01


Date Seen by a Provider:  Oct 27, 2021


Time Seen by a Provider:  08:00


I consulted on this patient on


10/27/21


 12:48


Attending Physician


Nancy Bah DO


Admitting Physician


Nancy Bah DO


Consult








Allergies and Home Medications


Allergies


Coded Allergies:  


     No Known Drug Allergies (Unverified , 11/12/20)





Patient Home Medication List


Home Medication List Reviewed:  Yes


Amlodipine Besylate (Amlodipine Besylate) 5 Mg Tablet, 5 MG PO DAILY, (Reported)


   Entered as Reported by: ANJELICA PEPPER on 8/31/21 0853


   Last Action: Reviewed


Aspirin (Aspirin EC) 81 Mg Tablet.dr, 81 MG PO DAILY, (Reported)


   Entered as Reported by: ANJELICA PEPPER on 8/31/21 0853


   Last Action: Reviewed


Atorvastatin Calcium (Atorvastatin Calcium) 40 Mg Tablet, 40 MG PO DAILY, 

(Reported)


   Entered as Reported by: ANJELICA PEPPER on 8/31/21 0853


   Last Action: Reviewed


Budesonide/Formoterol Fumarate (Symbicort 160-4.5 Mcg Inhaler) 10.2 Gm 

Hfa.aer.ad, 2 PUFF IH BID, (Reported)


   Entered as Reported by: ANJELICA PEPPER on 8/31/21 0853


   Last Action: Reviewed


Carvedilol (Carvedilol) 3.125 Mg Tablet, 3.125 MG PO BID, (Reported)


   Entered as Reported by: ANJELICA PEPPER on 8/31/21 0853


   Last Action: Reviewed


Clopidogrel Bisulfate (Clopidogrel) 75 Mg Tablet, 75 MG PO DAILY, (Reported)


   Entered as Reported by: ANJELICA PEPPER on 8/31/21 0853


   Last Action: Reviewed


Famotidine (Famotidine) 20 Mg Tablet, 20 MG PO DAILY, (Reported)


   Entered as Reported by: ANJELICA PEPPER on 8/31/21 0853


   Last Action: Reviewed


Fluoxetine HCl (Fluoxetine HCl) 40 Mg Capsule, 40 MG PO HS, (Reported)


   Entered as Reported by: ANJELICA PEPPER on 8/31/21 0903


   Last Action: Reviewed


Olmesartan/Hydrochlorothiazide (Benicar Hct 20-12.5 mg Tablet) 1 Each Tablet, 1 

EACH PO HS, (Reported)


   Entered as Reported by: ANJELICA PEPPER on 8/31/21 0853


   Last Action: Reviewed


Sennosides/Docusate Sodium (Senna-S Tablet) 1 Each Tablet, 1 EACH PO BID, 

(Reported)


   Entered as Reported by: ANJELICA PEPPER on 8/31/21 0853


   Last Action: Reviewed


Discontinued Medications


Cefdinir (Cefdinir) 300 Mg Capsule, 300 MG PO BID


   Discontinued Reason: No Longer Taking


   Prescribed by: NANCY BAH on 8/31/21 1029


   Last Action: Discontinued





Past Medical-Social-Family Hx


Patient Social History


Tobacco Use?:  No


Smoking Status:  Former Smoker


Smokeless Tobacco Frequency:  Never a User


Use of E-Cig and/or Vaping dev:  No


Substance use?:  No


Alcohol Use?:  No


Pt feels they are or have been:  No





Immunizations Up To Date


Date of Influenza Vaccine:  Nov 10, 2020


First/Initial COVID19 Vaccinat:  2021


Second COVID19 Vaccination Joey:  2021


Tetanus Booster (TDap):  Unknown





Seasonal Allergies


Seasonal Allergies:  No





Current Status


Pregnancy status:  No


Advance Directives:  No


Advance Directive Location:  DAUGHTER IS POA


Communicates:  Verbally


Primary Language:  English


Preferred Spoken Language:  English


Is interpretation needed?:  No


Sensory deficits:  Vision impairment


Implanted or Applied Medical D:  Stents





Past Medical History


Surgeries:  Cardiac, CABG, Coronary Stent, Vascular Surgery


COPD


Currently Using CPAP:  No


Currently Using BIPAP:  No


Aneurysm, Cardiomyopathy, Coronary Artery Disease, Heart Attack, High 

Cholesterol, Hypertension


Stroke


Sexually Transmitted Disease:  No


HIV/AIDS:  No


Gastroesophageal Reflux


Arthritis, Chronic Back Pain


Diabetes, Non-Insulin dep


Loss of Vision:  Right


Hearing Impairment:  Denies


Blood Disorders:  No





Family Medical History





Patient reports no known family medical history.


Heart Disease, Diabetes, Hypertension





Review of Systems


Constitutional:  No chills, No dizziness, No fever, No weakness


EENTM:  vision loss; No hearing loss, No eye pain


Respiratory:  No cough, No dyspnea on exertion, No short of breath


Cardiovascular:  No chest pain, No edema, No palpitations


Gastrointestinal:  No abdominal pain, No constipation, No diarrhea, No dysphagia


Genitourinary:  No decreased output, No dysuria, No frequency, No hematuria


Musculoskeletal:  No back pain, No joint pain, No muscle pain


Skin:  No change in color, No change in hair/nails


Psychiatric/Neurological:  Denies Anxiety, Denies Depressed, Denies Headache, 

Denies Numbness





All Other Systems Reviewed


Negative Unless Noted:  Yes





Physical Exam


Vital Signs





Vital Signs - First Documented




















Capillary Refill : Less Than 3 Seconds


Height, Weight, BMI


Height: '"


Weight: lbs. oz. kg; 22.45 BMI


Method:


General Appearance:  No Apparent Distress, Chronically ill


Eyes:  Bilateral Eye Normal Inspection, Bilateral Eye PERRL, Bilateral Eye EOMI


HEENT:  PERRL/EOMI, Normal ENT Inspection, Pharynx Normal


Neck:  Full Range of Motion, Normal Inspection, Non Tender, Supple


Respiratory:  Chest Non Tender, Lungs Clear, Normal Breath Sounds, No Accessory 

Muscle Use, No Respiratory Distress


Cardiovascular:  Regular Rate, Rhythm, No Edema, Normal Peripheral Pulses


Gastrointestinal:  Normal Bowel Sounds, Non Tender, Soft


Rectal:  Deferred


Back:  Normal Inspection, No CVA Tenderness, No Vertebral Tenderness


Extremity:  Normal Capillary Refill, Normal Inspection, Normal Range of Motion, 

Non Tender, No Pedal Edema, Other (strength +3/5 x4 extremities)


Neurologic/Psychiatric:  Alert, No Motor/Sensory Deficits, Normal Mood/Affect, 

CNs II-XII Norm as Tested, Aphasia (mild word-finding difficulty), Disoriented 

(unable to state place/date), Facial Droop (slight R facial droop); No Sensory 

Deficit; Other (trouble pronouncing certain words eg. stated her name as "SUJEY Holloway")


Skin:  Normal Color, Warm/Dry


Lymphatic:  No Adenopathy





Assessment/Plan


Assessment and Plan


Dysarthria


Aphasia


   CTA and CT head shows no evidence of acute infarct


   CXR normal


   MRI head today


   Continue to monitor for neuro deficits


   PT/OT/ST eval for IRF placement


L side weakness


   Appears resolved


CAD w/ 3x stents


   Continue aspirin/plavix home regimen


COPD


HLD


   Continue home regimen


HTN


GERD


T2DM


   Metformin held due to contrast





Admission Diagnosis


Admission Status:  Observation





Clinical Quality Measures


Stroke:


Date of last known well:  Oct 26, 2021


Time of last known well:  10:00





NANCY BAH DO 10/28/21 0521:


History of Present Illness


History of Present Illness


Reason for visit/HPI


Chief complaint: Recurrent stroke





History present illness: This is a 76-year-old white female clinic patient of 

mine who has a prior stroke with minimal residual dysarthria who presented to 

the ER with strokelike symptoms with left-sided weakness and difficulty 

speaking.  ER revealed no significant acute abnormality but due to her deficits 

she was placed in observation on telemetry to obtain MRI and further risk 

stratification.  MRI did confirm new CVA.  Inpatient rehab will be pursued.





Allergies and Home Medications


Allergies


Coded Allergies:  


     No Known Drug Allergies (Unverified , 11/12/20)





Patient Home Medication List


Home Medication List Reviewed:  Yes


Amlodipine Besylate (Amlodipine Besylate) 5 Mg Tablet, 5 MG PO DAILY, (Reported)


   Entered as Reported by: ANJELICA PEPPER on 8/31/21 0853


   Last Action: Reviewed


Aspirin (Aspirin EC) 81 Mg Tablet.dr, 81 MG PO DAILY, (Reported)


   Entered as Reported by: ANJELICA PEPPER on 8/31/21 0853


   Last Action: Reviewed


Atorvastatin Calcium (Atorvastatin Calcium) 40 Mg Tablet, 40 MG PO DAILY, (

Reported)


   Entered as Reported by: ANJELICA PEPPER on 8/31/21 0853


   Last Action: Reviewed


Budesonide/Formoterol Fumarate (Symbicort 160-4.5 Mcg Inhaler) 10.2 Gm 

Hfa.aer.ad, 2 PUFF IH BID, (Reported)


   Entered as Reported by: ANJELICA PEPPER on 8/31/21 0853


   Last Action: Reviewed


Carvedilol (Carvedilol) 3.125 Mg Tablet, 3.125 MG PO BID, (Reported)


   Entered as Reported by: ANJELICA PEPPER on 8/31/21 0853


   Last Action: Reviewed


Clopidogrel Bisulfate (Clopidogrel) 75 Mg Tablet, 75 MG PO DAILY, (Reported)


   Entered as Reported by: ANJELICA PEPPER on 8/31/21 0853


   Last Action: Reviewed


Famotidine (Famotidine) 20 Mg Tablet, 20 MG PO DAILY, (Reported)


   Entered as Reported by: ANJELICA PEPPER on 8/31/21 0853


   Last Action: Reviewed


Fluoxetine HCl (Fluoxetine HCl) 40 Mg Capsule, 40 MG PO HS, (Reported)


   Entered as Reported by: ANJELICA PEPPER on 8/31/21 0903


   Last Action: Reviewed


Olmesartan/Hydrochlorothiazide (Benicar Hct 20-12.5 mg Tablet) 1 Each Tablet, 1 

EACH PO HS, (Reported)


   Entered as Reported by: ANJELICA PEPPER on 8/31/21 0853


   Last Action: Reviewed


Sennosides/Docusate Sodium (Senna-S Tablet) 1 Each Tablet, 1 EACH PO BID, 

(Reported)


   Entered as Reported by: ANJELICA PEPPER on 8/31/21 0853


   Last Action: Reviewed


Discontinued Medications


Cefdinir (Cefdinir) 300 Mg Capsule, 300 MG PO BID


   Discontinued Reason: No Longer Taking


   Prescribed by: NANCY BAH on 8/31/21 1029


   Last Action: Discontinued





Past Medical-Social-Family Hx


Patient Social History


Marrital Status:  single


Employed/Student:  retired


Smoking Status:  Former Smoker


Substance use?:  No


Alcohol Use?:  No





Past Medical History


Surgeries:  Vascular Surgery (Carotid)


COPD


Coronary Artery Disease, High Cholesterol, Hypertension


Stroke


Bladder Infection, Renal Failure


Arthritis


Diabetes, Non-Insulin dep


Depression





Family Medical History





Patient reports no known family medical history.





Review of Systems


Constitutional:  see HPI, dizziness, weakness


EENTM:  no symptoms reported


Respiratory:  no symptoms reported


Cardiovascular:  no symptoms reported


Gastrointestinal:  no symptoms reported


Genitourinary:  no symptoms reported


Musculoskeletal:  no symptoms reported


Skin:  no symptoms reported


Psychiatric/Neurological:  Weakness





All Other Systems Reviewed


Negative Unless Noted:  Yes





Physical Exam


General Appearance:  No Apparent Distress, WD/WN, Chronically ill


Eyes:  Bilateral Eye Normal Inspection, Bilateral Eye PERRL, Bilateral Eye EOMI


HEENT:  PERRL/EOMI, Normal ENT Inspection, Pharynx Normal


Neck:  Full Range of Motion, Normal Inspection, Non Tender, Supple, Carotid 

Bruit


Respiratory:  Chest Non Tender, Lungs Clear, Normal Breath Sounds, No Accessory 

Muscle Use, No Respiratory Distress


Cardiovascular:  Regular Rate, Rhythm, No Edema, No Gallop, No JVD, No Murmur, 

Normal Peripheral Pulses


Gastrointestinal:  Normal Bowel Sounds, No Organomegaly, No Pulsatile Mass, Non 

Tender, Soft


Back:  Normal Inspection, No CVA Tenderness, No Vertebral Tenderness


Extremity:  Normal Capillary Refill, Normal Inspection, Normal Range of Motion, 

Non Tender, No Calf Tenderness, No Pedal Edema


Neurologic/Psychiatric:  Alert, No Motor/Sensory Deficits, Normal Mood/Affect, 

Aphasia (mild word-finding difficulty), Disoriented (unable to state 

place/date), Facial Droop (slight R facial droop)


Skin:  Normal Color, Warm/Dry


Lymphatic:  No Adenopathy





Assessment/Plan


Assessment and Plan


Assessment:


Subacute CVA not a TPA candidate confirmed on MRI


Chronic stroke changes on MRI


Dysarthria


Subtle confusion


Diabetes


CAD previous bypass


PVD previous carotid stent


Previous smoker


History of UTI


Depression





Plan:


Rehab


Problems:  


(1) CVA (cerebral vascular accident)


(2) Dysarthria





Admission Diagnosis


Admission Status:  Observation





Supervisory-Addendum Brief


Verification & Attestation


Participated in pt care:  history, MDM, physical


Personally performed:  exam, history, MDM, supervision of care


Care discussed with:  Medical Student


Procedures:  n/a


Results interpretation:  Verified all documentation


Verification and Attestation of Medical Student E/M Service





A medical student performed and documented this service in my presence. I 

reviewed and verified all information documented by the medical student and made

modifications to such information, when appropriate. I personally performed the 

physical exam and medical decision making. 





 Nancy Bah, Oct 28, 2021,05:16











TIERRA CEDEÑO MED STUDENT       Oct 27, 2021 12:51


NANCY BAH DO                Oct 28, 2021 05:21

## 2021-10-27 NOTE — ST COGNITIVE LINGUISTIC EVAL
Speech Evaluation-General


Medical Diagnosis


CVA


Onset Date:  Oct 26, 2021





Therapy Diagnosis


Therapy Diagnosis:  Aphasia, Dysarthria





Referral


Referring Physician:  Dr. Petit


Reason for Referral:  Evaluation/Treatment





Medical History


Pertinent Medical History:  CABG, CAD, COPD, CVA, DM, PVD


Reviewed History:  Yes





Social History


Current Living Status:  Alone





Speech PLF-Current Status


Prior Level of Function





Patient is known to this clinician due to a previous CVA and ARU


admission. The patient currently lives in a local [facility] where she was


primarily independent with her daily needs.





Subjective


Patient was pleasant and cooperative with the cognitive/speech assessment. 

Daughter was present and reported that the patient has continued to receive 

skilled ST in the home.





Language Eval: Auditory


Comprehends Simple Yes/No Ques:  Functional


Follows 1-Step Commands:  Functional


Follows Complex Directions:  Mild


Follows General Conversations:  Mild





Objective Cognitive Domain


Attention:  WNL


Memory:  Mild


Problem Solving:  Mild


Executive Functions:  Mild


Visuospatial Skills:  WNL


Composite Severity Rating:  Mild





Objective


Formal/Standardized Tests


[facility] Mental Status ([facility]) subtests, informal tasks, observation

and chart review.


Results


The patient presents with clear speech, memory in tact at 80%, able to answer 

questions appropriately and state preferences. Daughter was present who cued 

patient on occasion.


Oral Motor/Speech Production


Patient's speech is clear and intelligible. She does state she had much more 

slurred speech yesterday upon admission.


Impression


Patient is a pleasant 77 y/o female who is known to this clinician from a 

previous CVA and admission. Patient has continued to receive skilled ST in her 

home since that time. She was evaluated in her room this afternoon with her 

daughter present. She completed subtests of the SLUMS, informal speech tasks 

with observation and chart review. Patient was eating a candy bar upon my 

entering the room. She continued to eat while answering questions without any 

difficulty note. Speech was reported as slurred yesterday, however today her 

speech is clear and intelligible. She exhibited mild memory deficit which her 

daughter cued her as needed. She currently lives in [facility] which

she states she loves. She voiced she does not want to do therapy on the 

inpatient rehab, but prefers to do it in her home. Patient has mild deficits at 

this time with memory and safety awareness. She states she has a little bit of 

weakness on the left side this time. She also states her "trouble" with the last

CVA was on the right side. She states she is doing ok but feels she is stuck 

"between heaven and hell".





Speech Short Term Goals


Short Term Goals


Short Term Goals


1) Patient will complete memory tasks related to her daily needs at 80% or 

greater with minimal cues.


2) Patient will complete safety awareness tasks related to her daily needs at 

80% or greater with minimal cues.





Speech Long Term Goals


Long Term Goals


Patient will be able to complete daily tasks with improved function and safety.





Speech-Plan


Patient/Family Goals


Patient/Family Goals:  


Patient plans on returning to her [facility] apartment and resuming previouos


therapy.





Treatment Plan


Speech Therapy Treatment Plan:  Continue Plan of Care


Treatment Duration:  Nov 2, 2021


Frequency:  4 times per week


Estimated Hrs Per Day:  .25 hour per day


Rehab Potential:  Fair


Barriers to Learning:  


Patient's mild deficits, previous CVA


Pt/Family Agrees to Plan:  Yes





Safety Risks/Education


Teaching Recipient:  Patient, Family


Teaching Methods:  Demonstration, Discussion


Response to Teaching:  Verbalize Understanding, Return Demonstration


Education Topics Provided:  


Safety within her room and communication of wants/needs.





Time


Speech Therapy Time In:  14:30


Speech Therapy Time Out:  14:50


Total Billed Time:  20


Billed Treatment Time


1, [address]            Oct 27, 2021 15:00

## 2021-10-28 VITALS — DIASTOLIC BLOOD PRESSURE: 68 MMHG | SYSTOLIC BLOOD PRESSURE: 148 MMHG

## 2021-10-28 VITALS — SYSTOLIC BLOOD PRESSURE: 166 MMHG | DIASTOLIC BLOOD PRESSURE: 73 MMHG

## 2021-10-28 VITALS — SYSTOLIC BLOOD PRESSURE: 160 MMHG | DIASTOLIC BLOOD PRESSURE: 72 MMHG

## 2021-10-28 VITALS — DIASTOLIC BLOOD PRESSURE: 75 MMHG | SYSTOLIC BLOOD PRESSURE: 168 MMHG

## 2021-10-28 VITALS — DIASTOLIC BLOOD PRESSURE: 72 MMHG | SYSTOLIC BLOOD PRESSURE: 170 MMHG

## 2021-10-28 VITALS — DIASTOLIC BLOOD PRESSURE: 69 MMHG | SYSTOLIC BLOOD PRESSURE: 159 MMHG

## 2021-10-28 LAB
APTT PPP: YELLOW S
BACTERIA #/AREA URNS HPF: (no result) /HPF
BILIRUB UR QL STRIP: NEGATIVE
FIBRINOGEN PPP-MCNC: CLEAR MG/DL
GLUCOSE UR STRIP-MCNC: NEGATIVE MG/DL
KETONES UR QL STRIP: NEGATIVE
LEUKOCYTE ESTERASE UR QL STRIP: (no result)
NITRITE UR QL STRIP: NEGATIVE
PH UR STRIP: 6 [PH] (ref 5–9)
PROT UR QL STRIP: (no result)
RBC #/AREA URNS HPF: (no result) /HPF
SP GR UR STRIP: 1.02 (ref 1.02–1.02)
SQUAMOUS #/AREA URNS HPF: (no result) /HPF
WBC #/AREA URNS HPF: (no result) /HPF

## 2021-10-28 RX ADMIN — CEFDINIR SCH MG: 300 CAPSULE ORAL at 20:20

## 2021-10-28 RX ADMIN — FLUOXETINE SCH MG: 20 CAPSULE ORAL at 20:20

## 2021-10-28 RX ADMIN — CLOPIDOGREL BISULFATE SCH MG: 75 TABLET, FILM COATED ORAL at 08:32

## 2021-10-28 RX ADMIN — DOCUSATE SODIUM SCH MG: 100 CAPSULE ORAL at 08:32

## 2021-10-28 RX ADMIN — ASPIRIN SCH MG: 325 TABLET, COATED ORAL at 08:32

## 2021-10-28 RX ADMIN — INSULIN ASPART SCH UNIT: 100 INJECTION, SOLUTION INTRAVENOUS; SUBCUTANEOUS at 20:04

## 2021-10-28 RX ADMIN — INSULIN ASPART SCH UNIT: 100 INJECTION, SOLUTION INTRAVENOUS; SUBCUTANEOUS at 15:26

## 2021-10-28 RX ADMIN — INSULIN ASPART SCH UNIT: 100 INJECTION, SOLUTION INTRAVENOUS; SUBCUTANEOUS at 11:52

## 2021-10-28 RX ADMIN — INSULIN ASPART SCH UNIT: 100 INJECTION, SOLUTION INTRAVENOUS; SUBCUTANEOUS at 05:23

## 2021-10-28 NOTE — OCCUPATIONAL THER DAILY NOTE
OT Current Status-Daily Note


Subjective


No pain reported.





Appearance


Pt. up in chair.  Daughter at her side.  Pt. had just had shower.





Mental Status/Objective


Patient Orientation:  Person, Place, Time, Situation





ADL-Treatment


Therapy Code Descriptions/Definitions 





Functional [place] Measure:


0=Not Assessed/NA        4=Minimal Assistance


1=Total Assistance        5=Supervision or Setup


2=Maximal Assistance  6=Modified [place]


3=Moderate Assistance 7=Complete IndependenceSCALE: Activities may be completed 

with or without assistive devices.





6-Indepedent-patient completes the activity by him/herself with no assistance 

from a helper.


5-Set-up or Clean-up Assistance-helper sets up or cleans up; patient completes 

activity. [place] assists only prior to or  


    following the activity.


4-Supervision or Touching Assistance-helper provides verbal cues and/or 

touching/steadying and/or contact guard assistance as patient completes 

activity. Assistance may be provided   


    throughout the activity or intermittently.


3-Partial/Moderate Assistance-helper does LESS THAN HALF the effort. [place] 

lifts, holds or supports trunk or limbs, but provides less than half the effort.


2-Substantial/Maximal Assistance-helper does MORE THAN HALF the effort. [place] 

lifts or holds trunk or limbs and provides more than half the effort.


6-[place] does ALL the effort. Patient does none of the effort to 

complete the activity. Or, the assistance of 2 or more helpers is required for 

the patient to complete the  


    activity.


If activity was not attempted, code reason:


7-Patient Refused.


9-Not Applicable-not attempted and the patient did not perform the activity 

before the current illness, exacerbation or injury.


10-Not Attempted due to Environmental Limitations-(lack of equipment, weather 

restraints, etc.).


88-Not Attempted due to Medical Conditions or Safety Concerns.


Shower/Bathe Self (QC):  4 (CGA per pt. and her daughter.)


Upper Body Dressing (QC):  4 (SBA per pt. and her daughter.)


Lower Body Dressing (QC):  3 (Min assist per pt. and her daughter.)


On/Off Footwear:  4 (SBA to doff/don slipper socks.)


Toileting Hygiene (QC):  4 (CGA to doff pants and underwear off hips, sit on 

toilet, cleanse self, and don back over hips.)


Toilet Transfer (QC):  4 (SBA)





Other Treatment


Pt. agrees to work with OT.  She practices slipper socks first, and then stands 

from chair with CGA to ambulate into bathroom.  Pt. does not use adaptive 

device.  Pt. practices toileting, although she has just been straight cathed and

does not have to go.  Pt. stands from toilet and ambulates to sink with CGA.  

Brushes hair with right hand, and CGA provided for balance due to ataxic type 

movements.  Pt. ambulates back to reclining chair, and all needs met.





Education


OT Patient Education:  Correct positioning, Modified ADL techniques, Progress 

toward Goal/Update tx plan, Purpose of tx/functional activities, Reviewed 

precautions, Rehab process, Transfer techniques


Teaching Recipient:  Patient, Family


Teaching Methods:  Demonstration, Discussion


Response to Teaching:  Verbalize Understanding, Return Demonstration





OT Long Term Goals


Long Term Goals


Time Frame:  Nov 5, 2021


Oral Hygiene (QC):  5


Toileting Hygiene (QC):  6


Upper Body Dressing (QC):  5


Lower Body Dressing (QC):  5


On/Off Footwear (QC):  5


1=Demonstrate adherence to instructed precautions during ADL tasks.


2=Patient will verbalize/demonstrate understanding of assistive 

devices/modifications for ADL.


3=Patient will improve strength/tolerance for activity to enable patient to 

perform ADL's.





OT Education/Plan


Problem List/Assessment


Assessment:  Decreased Activ Tolerance, Impaired Funct Balance, Impaired I 

ADL's, Impaired Self-Care Skills, Visual-Perceptual Deficit





Discharge Recommendations


Plan/Recommendations:  Continue POC


Therapy Discharge Recommendati:  Post Acute OT





Treatment Plan/Plan of Care


Patient would benefit from OT for education, treatment and training to promote 

independence in ADL's, mobility, safety and/or upper extremity function for ADL'

s.


Plan of Care:  ADL Retraining, Functional Mobility, UE Funct Exercise/Act, UE 

Neuromus Re-Ed/Coord, Visual/Perceptual Retrain


Treatment Duration:  Nov 5, 2021


Frequency:  5 times per week


Estimated Hrs Per Day:  .25 hour per day


Agreement:  Yes


Rehab Potential:  Good





Time/GCodes


Start Time:  11:15


Stop Time:  11:30


Total Time Billed (hr/min):  15


Billed Treatment Time


1, ADL











ROSANNA LOVING OT           Oct 28, 2021 13:01

## 2021-10-28 NOTE — PROGRESS NOTE
TIERRA CEDEÑO MED STUDENT 10/28/21 1244:


Subjective


Date Seen by a Provider:  Oct 28, 2021


Time Seen by a Provider:  08:00


Subjective/Events-last exam


Pt was seen and examined. Daughter is present in room. Awake, alert, pleasant 

affect, oriented x1 to self, disoriented to place/time. Continues to have 

dysarthria/aphasia trying to recall.


No issues overnight. Last BM yesterday. Plan for transfer to IRF pending 

insurance approval. Findings from head MRI and TTE were discussed and she 

verbalized understanding. She had some concern over L hand numbness and L cheek 

impaired temperature sensation that she has had since March of this year. She 

has complaints of discomfort and urgency with urination. No complaints of chest 

pain, SOB, N/V, abd pain.


Review of Systems


General:  No Chills, No Fatigue


HEENT:  No Visual Changes, No Eye Pain, No Ear Pain


Pulmonary:  No Dyspnea, No Cough


Cardiovascular:  No: Chest Pain, Palpitations, Edema


Gastrointestinal:  No: Nausea, Vomiting, Abdominal Pain, Diarrhea, Constipation


Genitourinary:  Dysuria; No Incontinence, No Hematuria; Other (urgency)


Musculoskeletal:  No: neck pain, shoulder pain, back pain


Neurological:  Numbness (palmar aspect of L hand, impaired hot/cold sensation L 

cheek)





Focused Exam


Sepsis Stage:  Ruled Out


Reason for ruling out sepsis:  0 SIRS criteria





Objective


Exam


Last Set of Vital Signs





Vital Signs








  Date Time  Temp Pulse Resp B/P (MAP) Pulse Ox O2 Delivery O2 Flow Rate FiO2


 


10/28/21 11:25 36.6 65 20 148/68 (94) 97 Room Air  





Capillary Refill : Less Than 3 Seconds


I&O











Intake and Output 


 


 10/28/21





 00:00


 


Intake Total 1300 ml


 


Balance 1300 ml


 


 


 


Intake Oral 1300 ml


 


# Voids 9


 


# Bowel Movements 1








General:  Alert, No Acute Distress, Other (oriented x1 to self, disoriented to 

place/time)


HEENT:  PERRLA, EOMI


Neck:  Supple, No JVD


Lungs:  Clear to Auscultation


Heart:  Regular Rate, Normal S1, Normal S2


Abdomen:  Normal Bowel Sounds, Soft, No Tenderness


Extremities:  No Clubbing, No Cyanosis, No Edema, Normal Pulses


Skin:  No Rashes


Neuro:  Cranial Nerves 3-12 NL, Other (dysarthria and aphasia when asked certain

questions eg place/time)





Results


Lab


Laboratory Tests


10/27/21 20:42: Glucometer 121H


10/27/21 21:11: Glucometer 112H


10/28/21 05:22: Glucometer 104


10/28/21 11:10: 


Urine Color YELLOW, Urine Clarity CLEAR, Urine pH 6.0, Urine Specific Gravity 

1.025H, Urine Protein TRACEH, Urine Glucose (UA) NEGATIVE, Urine Ketones 

NEGATIVE, Urine Nitrite NEGATIVE, Urine Bilirubin NEGATIVE, Urine Urobilinogen 

0.2, Urine Leukocyte Esterase 1+H, Urine RBC (Auto) TRACE-IH, Urine RBC 0-2, 

Urine WBC 50-100H, Urine Squamous Epithelial Cells 0-2, Urine Crystals NONE, 

Urine Bacteria MODERATEH, Urine Casts NONE, Urine Mucus NEGATIVE, Urine Culture 

Indicated YES


10/28/21 11:19: Glucometer 177H





Assessment/Plan


Assessment/Plan


Assess & Plan/Chief Complaint


Subacute CVA


Chronic stroke changes


Dysarthria


Subtle confusion


   New infarct seen on MRI, L posterior frontal lobe


   TTE shows possible PFO, Cardiology consulted 


   IRF evaluation completed and accepted, pending insurance approval for 

transfer


Dysuria


   UA/UC ordered


Diabetes


CAD


PVD


Depression


   Continue home regimen


   Insulin sliding scale





Clinical Quality Measures


Admission Status


Admission Dx


Dysarthria


Aphasia


   CTA and CT head shows no evidence of acute infarct


   CXR normal


   MRI head today


   Continue to monitor for neuro deficits


   PT/OT/ST eval for IRF placement


L side weakness


   Appears resolved


CAD w/ 3x stents


   Continue aspirin/plavix home regimen


COPD


HLD


   Continue home regimen


HTN


GERD


T2DM


   Metformin held due to contrast





Stroke:


Date of last known well:  Oct 26, 2021


Time of last known well:  10:00





NANCY BAH DO 10/29/21 0550:


Subjective


Subjective/Events-last exam


Dysarthria continues


Overall doing well


Inpatient rehab was denied by insurance and skilled care was offered and family 

not asking for expedited appeal


UTI will be managed


Dr. Simon will evaluate PFO


Review of Systems


General:  Fatigue, Malaise


Neurological:  Weakness, Numbness (palmar aspect of L hand, impaired hot/cold 

sensation L cheek), Incoordination, Change in speech, Confusion





Objective


Exam


General:  Alert, Oriented X3, Cooperative, No Acute Distress


Lungs:  Clear to Auscultation, Normal Air Movement


Heart:  Regular Rate, Normal S1, Normal S2, No Murmurs


Psych/Mental Status:  Mood NL





Assessment/Plan


Assessment/Plan


Assess & Plan/Chief Complaint


UTI treatment


Dr. Simon to evaluate PFO


May require anticoagulation if atrial fibrillation is suspected





Supervisory-Addendum Brief


Verification & Attestation


Participated in pt care:  history, MDM, physical


Personally performed:  exam, history, MDM, supervision of care


Care discussed with:  Medical Student


Procedures:  n/a


Results interpretation:  Verified all documentation


Verification and Attestation of Medical Student E/M Service





A medical student performed and documented this service in my presence. I rev

iewed and verified all information documented by the medical student and made 

modifications to such information, when appropriate. I personally performed the 

physical exam and medical decision making. 





 Nancy Bah, Oct 29, 2021,05:48











TIERRA CEDEÑO MED STUDENT       Oct 28, 2021 12:44


NANCY BAH DO                Oct 29, 2021 05:50

## 2021-10-28 NOTE — CONSULTATION-CARDIOLOGY
HPI-Cardiology


Cardiology Consultation:


Date of Consultation


10/28/2021


Date of Admission


10/26/2021


Attending Physician


Nancy Bah DO


Admitting Physician


Nancy Bah DO


Consulting Physician


RANDY GARRETT JR, MD





HPI:


Time Seen by a Provider:  18:29


Chief Complaint:


Reason for consultation: Cerebrovascular accident with possible patent foramen 

ovale


I had the pleasure of seeing Lupe on the medical unit at [facility] 

in [place], [place] this evening.  She has a history of coronary artery disease 

that was complicated by a perioperative stroke when she had bypass surgery in 

2020.  This occurred at an outside hospital in [place].  On the day of 

admission she developed worsening expressive aphasia.  She alerted her daughter 

who brought her to the hospital for further evaluation.  She was found to have 

an acute/subacute infarct of the high posterior left frontal lobe.  She then 

underwent an echocardiogram that showed a possible patent foramen ovale.  

Because of this finding on an echocardiogram, a cardiology consultation was 

requested.  She actually follows with one of my partners but I did not realize 

this until the end of my conversation with the patient due to her expressive 

aphasia.  Nonetheless, she does still have a fair amount of expressive aphasia. 

She can hold a conversation but does have trouble speaking in full sentences and

then becomes frustrated.  After her first stroke, she was unable to use her 

right arm to write or eat although she can still move her right arm.  She denies

chest pain, dyspnea, paroxysmal nocturnal dyspnea, time new, palpitations, 

lightheadedness, syncope, or ankle edema.





Certain portions of this document may have been dictated utilizing voice 

recognition technology.  Inherent to this technology, typographical and 

grammatical errors may exist.  As much as I am diligent to identify and correct 

these mistakes, some errors may remain in the document.





Review of Systems-Cardiology


Review of Systems


Other comments


Difficult to obtain due to her expressive aphasia.





All Other Systems Reviewed


Negative Unless Noted:  Yes





PMH-Social-Family Hx


Patient Social History


Marrital Status:  single


Employed/Student:  retired


Smoking Status:  Former Smoker


Have you traveled recently?:  No


Alcohol Use?:  No


Pt feels they are or have been:  No





Immunizations Up To Date


Tetanus Booster (TDap):  Unknown


Date of Influenza Vaccine:  Nov 10, 2020





Past Medical History


PMH


As described under Assessment.





Family Medical History


Family Medical History:  


She does not report any family history of premature coronary artery


disease.


Family History:  


Patient reports no known family medical history.





Allergies and Home Medications


Allergies


Coded Allergies:  


     No Known Drug Allergies (Unverified , 11/12/20)





Patient Home Medication List


Home Medication List Reviewed:  Yes


Amlodipine Besylate (Amlodipine Besylate) 5 Mg Tablet, 5 MG PO DAILY, (Reported)


   Entered as Reported by: ANJELICA PEPPER on 8/31/21 0853


   Last Action: Reviewed


Aspirin (Aspirin EC) 81 Mg Tablet.dr, 81 MG PO DAILY, (Reported)


   Entered as Reported by: ANJELICA PEPPER on 8/31/21 0853


   Last Action: Reviewed


Atorvastatin Calcium (Atorvastatin Calcium) 40 Mg Tablet, 40 MG PO DAILY, 

(Reported)


   Entered as Reported by: ANJELICA PEPPER on 8/31/21 0853


   Last Action: Reviewed


Budesonide/Formoterol Fumarate (Symbicort 160-4.5 Mcg Inhaler) 10.2 Gm 

Hfa.aer.ad, 2 PUFF IH BID, (Reported)


   Entered as Reported by: ANJELICA PEPPER on 8/31/21 0853


   Last Action: Reviewed


Carvedilol (Carvedilol) 3.125 Mg Tablet, 3.125 MG PO BID, (Reported)


   Entered as Reported by: ANJELICA PEPPER on 8/31/21 0853


   Last Action: Reviewed


Clopidogrel Bisulfate (Clopidogrel) 75 Mg Tablet, 75 MG PO DAILY, (Reported)


   Entered as Reported by: ANJELICA PEPPER on 8/31/21 0853


   Last Action: Reviewed


Famotidine (Famotidine) 20 Mg Tablet, 20 MG PO DAILY, (Reported)


   Entered as Reported by: ANJELICA PEPPER on 8/31/21 0853


   Last Action: Reviewed


Fluoxetine HCl (Fluoxetine HCl) 40 Mg Capsule, 40 MG PO HS, (Reported)


   Entered as Reported by: ANJELICA PEPPER on 8/31/21 0903


   Last Action: Reviewed


Olmesartan/Hydrochlorothiazide (Benicar Hct 20-12.5 mg Tablet) 1 Each Tablet, 1 

EACH PO HS, (Reported)


   Entered as Reported by: ANJELICA PEPPER on 8/31/21 0853


   Last Action: Reviewed


Sennosides/Docusate Sodium (Senna-S Tablet) 1 Each Tablet, 1 EACH PO BID, 

(Reported)


   Entered as Reported by: ANJELICA PEPPER on 8/31/21 0853


   Last Action: Reviewed


Discontinued Medications


Cefdinir (Cefdinir) 300 Mg Capsule, 300 MG PO BID


   Discontinued Reason: No Longer Taking


   Prescribed by: NANCY BAH on 8/31/21 1029


   Last Action: Discontinued





Exam


Vital Signs





Vital Signs








  Date Time  Temp Pulse Resp B/P (MAP) Pulse Ox O2 Delivery O2 Flow Rate FiO2


 


10/28/21 15:15 36.2 61 18 159/69 (99) 96 Room Air  








Physical Exam


General: Alert. No acute distress. Well nourished and appears stated age.


Eye: Extraocular movements are intact. Conjunctivae are clear. There are no 

xanthelasma.


HENT: Normocephalic. Atraumatic. Carotid pulsations 2/2 without bruits.


Neck: Jugular venous pressure does not appear elevated. No thyromegaly 

appreciated.


Respiratory: Lungs are clear to auscultation. Respirations are non-labored. 

Breath sounds are equal. Symmetrical chest wall expansion.


Cardiovascular: Normal rate. Regular rhythm. No murmur. No gallop. Point of 

maximal impulse is not appear displaced. Good pulses equal in all extremities. 

No edema.


Gastrointestinal: Soft. Normal bowel sounds.


Skin: Skin turgor is normal. There is no pallor.


Musculoskeletal: No kyphosis or scoliosis appreciated.


Neurologic: Alert and oriented to person, place, time. Cranial nerves 3-12 

appear grossly intact.  Expressive aphasia.  The patient has slight right-sided 

weakness but good strength on the left side.


Psychiatric: Cooperative. Appropriate mood & affect.


Labs





Laboratory Tests








Test


 10/27/21


20:42 10/27/21


21:11 10/28/21


05:22 10/28/21


11:10 Range/Units


 


 


Glucometer 121 H 112 H 104     MG/DL


 


Urine Color    YELLOW   


 


Urine Clarity    CLEAR   


 


Urine pH    6.0  5-9  


 


Urine Specific Gravity    1.025 H 1.016-1.022  


 


Urine Protein    TRACE H NEGATIVE  


 


Urine Glucose (UA)    NEGATIVE  NEGATIVE  


 


Urine Ketones    NEGATIVE  NEGATIVE  


 


Urine Nitrite    NEGATIVE  NEGATIVE  


 


Urine Bilirubin    NEGATIVE  NEGATIVE  


 


Urine Urobilinogen    0.2  < = 1.0  MG/DL


 


Urine Leukocyte Esterase    1+ H NEGATIVE  


 


Urine RBC (Auto)    TRACE-I H NEGATIVE  


 


Urine RBC    0-2   /HPF


 


Urine WBC     H  /HPF


 


Urine Squamous Epithelial


Cells 


 


 


 0-2 


  /HPF





 


Urine Crystals    NONE   /LPF


 


Urine Bacteria    MODERATE H  /HPF


 


Urine Casts    NONE   /LPF


 


Urine Mucus    NEGATIVE   /LPF


 


Urine Culture Indicated    YES   


 


Test


 10/28/21


11:19 10/28/21


15:07 


 


 Range/Units


 


 


Glucometer 177 H 149 H     MG/DL








Radiology


ECHOCARDIOGRAM (10/27/2021):


1. Left ventricle: The cavity size is normal. There is mild concentric 

hypertrophy. Systolic function is normal. The estimated ejection fraction is 60-

65%. There were no regional wall motion abnormalities identified. Doppler 

parameters are consistent with abnormal left ventricular relaxation (grade 1 

diastolic dysfunction).


2. Right ventricle: The cavity size is normal. Systolic function is mildly 

reduced. TAPSE 1.5 cm.


3. Right atrium: The right atrium is mildly dilated with an area of 20 cm.


4. 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 

foramen ovale seen in the apical four-chamber view.


5. Aortic valve: There is trivial regurgitation. There is mild aortic valve 

sclerosis.


6. Tricuspid valve: There is moderate regurgitation.


7. Pulmonary arteries: The estimated pulmonary artery systolic pressure is 34 

mmHg assuming a right atrial pressure of 5 mmHg.





ECG Impression


ECG


Comment


Electrocardiogram from 10/26/2021 shows sinus rhythm with left atrial 

abnormality, left ventricular hypertrophy with repolarization abnormality and 

probable old inferior and anterior infarcts.





Diagnosis/Problems


Diagnosis/Problems





(1) Acute cerebrovascular accident


Assessment & Plan:  She has had a second cerebrovascular accident in a different

vascular territory than her previous stroke from about 1 year ago.  This raises 

a concern for an embolic event.  Her echocardiogram did show a possible patent 

foramen ovale but in this age group, it would be more likely to have an embolic 

stroke from atrial fibrillation, carotid atherosclerosis or possibly aortic 

atherosclerosis.  Nonetheless, given the possible patent foramen ovale, I 

recommend further evaluation with a transesophageal echocardiogram and possible 

agitated saline injection to definitively determine whether or not the patient 

has a patent foramen ovale.  This may alter long-term management.  I have 

notified my partner, Dr. Dodge, who normally sees the patient and can perform 

the procedure tomorrow.  In the interim, I recommend she continue on aspirin, 

clopidogrel, and statin medication.  I would not change her dual antiplatelet 

therapy over to oral anticoagulant at this time.  Depending upon the results of 

the transesophageal echocardiogram, we may want to consider screening her for 

atrial fibrillation with an event monitor or possibly implantable loop recorder.

 I will leave that up to the discretion of her regular cardiologist.





(2) Patent foramen ovale


Assessment & Plan:  As above, her transthoracic echocardiogram from 10/27 shows 

a possible patent foramen ovale with left-to-right shunting.  I did review her 

previous transthoracic echocardiogram from August and this showed a suspicious 

area in the atrial septum but not as clearly identified as during yesterday's 

study.  We will evaluate this further with a transesophageal echocardiogram.





(3) Coronary artery disease without angina pectoris


Assessment & Plan:  She is status post coronary artery bypass surgery.  She is 

not having any angina.  Continue aspirin, beta-blocker, and statin medication.





(4) Primary hypertension


Assessment & Plan:  Blood pressures are intermittently elevated but in light of 

the acute stroke, I would avoid marked reductions in the blood pressure for the 

time being.





(5) Mixed hyperlipidemia


Assessment & Plan:  Continue statin medication.














RANDY GARRETT JR, MD         Oct 28, 2021 18:32

## 2021-10-28 NOTE — SPEECH THERAPY DAILY NOTE
Speech Daily Progress Note


Subjective


Time Seen by Provider:  00:08


Patient was resting in bed following her lunch, she states she is really tired. 

Patient was oriented to all concepts. Daughter at her bedside.





Objective


Patient able to answer all questions related to self/orientation with clear 

speech at 100%.





Assessment


Assessment Current Status:  Excellent Progress





Treatment Plan


Continue Plan of Care





Speech Short Term Goals


Short Term Goals


Short Term Goals


1) Patient will complete memory tasks related to her daily needs at 80% or 

greater with minimal cues.


2) Patient will complete safety awareness tasks related to her daily needs at 

80% or greater with minimal cues.





Speech Long Term Goals


Long Term Goals


Patient will be able to complete daily tasks with improved function and safety.





Speech-Plan


Patient/Family Goals


Patient/Family Goals:  


Patient plans on returning to her home, however she has been referred to


[facility] with insurance pending.





Treatment Plan


Speech Therapy Treatment Plan:  Continue Plan of Care


Treatment Duration:  Nov 2, 2021


Frequency:  4 times per week


Estimated Hrs Per Day:  .25 hour per day


Rehab Potential:  Good


Barriers to Learning:  


Patient's recent CVA, although speech/cognitive deficits are primarily


resolved


Pt/Family Agrees to Plan:  Yes





Safety Risks/Education


Teaching Recipient:  Patient, Family


Teaching Methods:  Demonstration, Discussion


Response to Teaching:  Verbalize Understanding, Return Demonstration


Education Topics Provided:  


Continued safety and communication





Time


Speech Therapy Time In:  13:35


Speech Therapy Time Out:  13:43


Total Billed Time:  8


Billed Treatment Time


1, FRANCHESKA Vargas            Oct 28, 2021 13:46

## 2021-10-28 NOTE — PHYSICAL THERAPY DAILY NOTE
PT Daily Note-Current


Subjective


Patient agrees to PT.  Family present.





Mental Status


Patient Orientation:  Person, Time, Situation





Transfers


SCALE: Activities may be completed with or without assistive devices.





6-Indepedent-patient completes the activity by him/herself with no assistance 

from a helper.


5-Set-up or Clean-up Assistance-helper sets up or cleans up; patient completes 

activity. [place] assists only prior to or  


    following the activity.


4-Supervision or Touching Assistance-helper provides verbal cues and/or 

touching/steadying and/or contact guard assistance as patient completes 

activity. Assistance may be provided   


    throughout the activity or intermittently.


3-Partial/Moderate Assistance-helper does LESS THAN HALF the effort. [place] 

lifts, holds or supports trunk or limbs, but provides less than half the effort.


2-Substantial/Maximal Assistance-helper does MORE THAN HALF the effort. [place] l

ifts or holds trunk or limbs and provides more than half the effort.


5-Vthzuvfwl-kesgzm does ALL the effort. Patient does none of the effort to 

complete the activity. Or, the assistance of 2 or more helpers is required for 

the patient to complete the  


    activity.


If activity was not attempted, code reason:


7-Patient Refused.


9-Not Applicable-not attempted and the patient did not perform the activity 

before the current illness, exacerbation or injury.


10-Not Attempted due to Environmental Limitations-(lack of equipment, weather 

restraints, etc.).


88-Not Attempted due to Medical Conditions or Safety Concerns.


Lying to Sitting/Side of Bed(Q:  6


Sit to Stand (QC):  4


Chair/Bed-to-Chair Xfer(QC):  4





Gait Training


Does the Patient Walk?:  Yes


Distance:  300'


Walk 10 feet (QC):  4


Walk 50 ft with 2 Turns(QC):  4


Walk 150 ft (QC):  4


Gait Assistive Device:  FWW


SBA with mobility





Exercises


Seated Therapy Exercises:  Ankle pumps, Long arc quads


Seated Reps:  15





Assessment


Patient tolerated treatment well and is up in recliner with family present.  

Family reports encouragement with patient's LOF.





PT Long Term Goals


Long Term Goals


PT Long Term Goals Time Frame:  Nov 3, 2021


Roll Left & Right (QC):  6


Sit to Lying (QC):  6


Lying-Sitting on Side/Bed(QC):  6


Sit to Stand (QC):  4


Chair/Bed-to-Chair Xfer(QC):  4


Walk 10 feet (QC):  4


Walk 50ft with 2 Turns (QC):  4


Walk 150 ft (QC):  4





PT Plan


Treatment/Plan


Treatment Plan:  Continue Plan of Care


Treatment Plan:  Bed Mobility, Education, Functional Activity Nona, Functional 

Strength, Gait, Safety, Therapeutic Exercise, Transfers


Treatment Duration:  Nov 3, 2021


Frequency:  6 times per week


Estimated Hrs Per Day:  .25 hour per day


Patient and/or Family Agrees t:  Yes





Time/GCodes


Time In:  903


Time Out:  918


Total Billed Treatment Time:  15


Total Billed Treatment


1 visit


FA 15 min











AVELINA ADRIAN PT              Oct 28, 2021 10:04

## 2021-10-29 VITALS — DIASTOLIC BLOOD PRESSURE: 78 MMHG | SYSTOLIC BLOOD PRESSURE: 154 MMHG

## 2021-10-29 VITALS — DIASTOLIC BLOOD PRESSURE: 56 MMHG | SYSTOLIC BLOOD PRESSURE: 118 MMHG

## 2021-10-29 VITALS — DIASTOLIC BLOOD PRESSURE: 78 MMHG | SYSTOLIC BLOOD PRESSURE: 178 MMHG

## 2021-10-29 VITALS — DIASTOLIC BLOOD PRESSURE: 58 MMHG | SYSTOLIC BLOOD PRESSURE: 137 MMHG

## 2021-10-29 VITALS — SYSTOLIC BLOOD PRESSURE: 131 MMHG | DIASTOLIC BLOOD PRESSURE: 73 MMHG

## 2021-10-29 VITALS — SYSTOLIC BLOOD PRESSURE: 155 MMHG | DIASTOLIC BLOOD PRESSURE: 80 MMHG

## 2021-10-29 VITALS — SYSTOLIC BLOOD PRESSURE: 120 MMHG | DIASTOLIC BLOOD PRESSURE: 59 MMHG

## 2021-10-29 VITALS — SYSTOLIC BLOOD PRESSURE: 123 MMHG | DIASTOLIC BLOOD PRESSURE: 60 MMHG

## 2021-10-29 VITALS — SYSTOLIC BLOOD PRESSURE: 164 MMHG | DIASTOLIC BLOOD PRESSURE: 74 MMHG

## 2021-10-29 VITALS — SYSTOLIC BLOOD PRESSURE: 125 MMHG | DIASTOLIC BLOOD PRESSURE: 62 MMHG

## 2021-10-29 VITALS — SYSTOLIC BLOOD PRESSURE: 173 MMHG | DIASTOLIC BLOOD PRESSURE: 77 MMHG

## 2021-10-29 VITALS — DIASTOLIC BLOOD PRESSURE: 64 MMHG | SYSTOLIC BLOOD PRESSURE: 148 MMHG

## 2021-10-29 VITALS — DIASTOLIC BLOOD PRESSURE: 71 MMHG | SYSTOLIC BLOOD PRESSURE: 167 MMHG

## 2021-10-29 LAB
ALBUMIN SERPL-MCNC: 3.8 GM/DL (ref 3.2–4.5)
ALP SERPL-CCNC: 81 U/L (ref 40–136)
ALT SERPL-CCNC: 15 U/L (ref 0–55)
BASOPHILS # BLD AUTO: 0 10^3/UL (ref 0–0.1)
BASOPHILS NFR BLD AUTO: 1 % (ref 0–10)
BILIRUB SERPL-MCNC: 0.4 MG/DL (ref 0.1–1)
BUN/CREAT SERPL: 13
CALCIUM SERPL-MCNC: 8.8 MG/DL (ref 8.5–10.1)
CHLORIDE SERPL-SCNC: 104 MMOL/L (ref 98–107)
CO2 SERPL-SCNC: 24 MMOL/L (ref 21–32)
CREAT SERPL-MCNC: 0.64 MG/DL (ref 0.6–1.3)
EOSINOPHIL # BLD AUTO: 0.1 10^3/UL (ref 0–0.3)
EOSINOPHIL NFR BLD AUTO: 2 % (ref 0–10)
GFR SERPLBLD BASED ON 1.73 SQ M-ARVRAT: 90 ML/MIN
GLUCOSE SERPL-MCNC: 108 MG/DL (ref 70–105)
HCT VFR BLD CALC: 42 % (ref 35–52)
HGB BLD-MCNC: 13.5 G/DL (ref 11.5–16)
LYMPHOCYTES # BLD AUTO: 1.5 10^3/UL (ref 1–4)
LYMPHOCYTES NFR BLD AUTO: 27 % (ref 12–44)
MANUAL DIFFERENTIAL PERFORMED BLD QL: NO
MCH RBC QN AUTO: 29 PG (ref 25–34)
MCHC RBC AUTO-ENTMCNC: 32 G/DL (ref 32–36)
MCV RBC AUTO: 90 FL (ref 80–99)
MONOCYTES # BLD AUTO: 0.6 10^3/UL (ref 0–1)
MONOCYTES NFR BLD AUTO: 11 % (ref 0–12)
NEUTROPHILS # BLD AUTO: 3.4 10^3/UL (ref 1.8–7.8)
NEUTROPHILS NFR BLD AUTO: 60 % (ref 42–75)
PLATELET # BLD: 308 10^3/UL (ref 130–400)
PMV BLD AUTO: 9.5 FL (ref 9–12.2)
POTASSIUM SERPL-SCNC: 3.8 MMOL/L (ref 3.6–5)
PROT SERPL-MCNC: 6.9 GM/DL (ref 6.4–8.2)
SODIUM SERPL-SCNC: 139 MMOL/L (ref 135–145)
WBC # BLD AUTO: 5.7 10^3/UL (ref 4.3–11)

## 2021-10-29 RX ADMIN — CEFDINIR SCH MG: 300 CAPSULE ORAL at 11:06

## 2021-10-29 RX ADMIN — ASPIRIN SCH MG: 325 TABLET, COATED ORAL at 11:06

## 2021-10-29 RX ADMIN — CLOPIDOGREL BISULFATE SCH MG: 75 TABLET, FILM COATED ORAL at 11:06

## 2021-10-29 RX ADMIN — INSULIN ASPART SCH UNIT: 100 INJECTION, SOLUTION INTRAVENOUS; SUBCUTANEOUS at 12:00

## 2021-10-29 RX ADMIN — DOCUSATE SODIUM SCH MG: 100 CAPSULE ORAL at 11:06

## 2021-10-29 RX ADMIN — INSULIN ASPART SCH UNIT: 100 INJECTION, SOLUTION INTRAVENOUS; SUBCUTANEOUS at 05:35

## 2021-10-29 NOTE — DISCHARGE SUMMARY
Diagnosis/Chief Complaint


Date of Admission


Oct 26, 2021 at 19:01


Date of Discharge





Discharge Date:  Oct 29, 2021


Discharge Diagnosis


Dysarthria


Aphasia


   CTA and CT head shows no evidence of acute infarct


   CXR normal


   MRI head today


   Continue to monitor for neuro deficits


   PT/OT/ST eval for IRF placement


L side weakness


   Appears resolved


CAD w/ 3x stents


   Continue aspirin/plavix home regimen


COPD


HLD


   Continue home regimen


HTN


GERD


T2DM


   Metformin held due to contrast





Reason Hospital Visit


Chief complaint: Recurrent stroke





History present illness: This is a 76-year-old white female clinic patient of 

mine who has a prior stroke with minimal residual dysarthria who presented to 

the ER with strokelike symptoms with left-sided weakness and difficulty 

speaking.  ER revealed no significant acute abnormality but due to her deficits 

she was placed in observation on telemetry to obtain MRI and further risk 

stratification.  MRI did confirm new CVA.  Inpatient rehab will be pursued.





Discharge Summary


Discharge Physical Examination


Allergies:  


Coded Allergies:  


     No Known Drug Allergies (Unverified , 11/12/20)


Vitals & I&Os





Vital Signs








  Date Time  Temp Pulse Resp B/P (MAP) Pulse Ox O2 Delivery O2 Flow Rate FiO2


 


10/29/21 12:41  59      


 


10/29/21 11:00 36.2       


 


10/29/21 11:00   18 154/78 (103) 96 Room Air  


 


10/29/21 09:00       5.00 








General Appearance:  Alert, Oriented X3, Cooperative


Respiratory:  Clear to Auscultation


Cardiovascular:  Regular Rate


Neuro:  Normal Gait, Normal Speech, Strength at 5/5 X4 Ext


Psych/Mental Status:  Mental Status NL





Hospital Course


Was the Problem List Reviewed?:  Yes


Hospital course:


Lupe presented to the ED on 10/26 with complaints of stroke-like symptoms 

including L sided numbness/weakness, dysarthria, and dysphasia. Stroke protocol 

was activated but she was beyond the window for tPA treatment. Her symptoms 

mostly resolved by the next morning but continued to have residual dysarthria, 

dysphasia and confusion. Her mental status remained A&Ox1 to self, throughout 

her stay. CT/CTA head demonstrated chronic changes/encephalomalacia consistent 

with her prior stroke. MRI head showed a new infarct in her L posterior frontal 

lobe. TTE was completed showing a possible PFO. Cardiology was consulted and AMBRE

was performed showing no PFO present. She developed symptoms consistent with UTI

during her stay and was started on Omnicef which helped resolve her symptoms by 

the time of discharge; she will finish her course of antibiotics as outpatient. 

She has no questions or concerns at time of discharge. She is being discharged 

to [facility] with [facility], today.


Labs (last 24 hrs)


Laboratory Tests


10/26/21 16:14: 


White Blood Count 7.8, Red Blood Count 4.70, Hemoglobin 13.5, Hematocrit 42, 

Mean Corpuscular Volume 90, Mean Corpuscular Hemoglobin 29, Mean Corpuscular 

Hemoglobin Concent 32, Red Cell Distribution Width 14.6H, Platelet Count 378, 

Mean Platelet Volume 9.8, Immature Granulocyte % (Auto) 0, Neutrophils (%) 

(Auto) 55, Lymphocytes (%) (Auto) 33, Monocytes (%) (Auto) 10, Eosinophils (%) 

(Auto) 2, Basophils (%) (Auto) 1, Neutrophils # (Auto) 4.2, Lymphocytes # (Auto)

2.6, Monocytes # (Auto) 0.8, Eosinophils # (Auto) 0.1, Basophils # (Auto) 0.1, 

Immature Granulocyte # (Auto) 0.0, Prothrombin Time 13.6, INR Comment 1.0, 

Activated Partial Thromboplast Time 38H, D-Dimer 0.37, Sodium Level 136, 

Potassium Level 4.2, Chloride Level 101, Carbon Dioxide Level 24, Anion Gap 11, 

Blood Urea Nitrogen 14, Creatinine 0.74, Estimat Glomerular Filtration Rate 77, 

BUN/Creatinine Ratio 19, Glucose Level 103, Glucometer 102, Calcium Level 9.8, 

Corrected Calcium 9.6, Total Bilirubin 0.4, Aspartate Amino Transf (AST/SGOT) 

15, Alanine Aminotransferase (ALT/SGPT) 17, Alkaline Phosphatase 91, Troponin I 

< 0.028, Total Protein 8.0, Albumin 4.3


10/26/21 16:33: 


Urine Color YELLOW, Urine Clarity CLEAR, Urine pH 6.0, Urine Specific Gravity 

1.020, Urine Protein NEGATIVE, Urine Glucose (UA) NEGATIVE, Urine Ketones 

NEGATIVE, Urine Nitrite NEGATIVE, Urine Bilirubin NEGATIVE, Urine Urobilinogen 

0.2, Urine Leukocyte Esterase TRACEH, Urine RBC (Auto) NEGATIVE, Urine RBC NONE,

Urine WBC 0-2, Urine Squamous Epithelial Cells RARE, Urine Crystals NONE, Urine 

Bacteria NEGATIVE, Urine Casts PRESENT, Urine Hyaline Casts RARE, Urine Mucus 

NEGATIVE, Urine Culture Indicated NO


10/27/21 05:07: 


White Blood Count 6.3, Red Blood Count 4.48, Hemoglobin 12.9, Hematocrit 40, 

Mean Corpuscular Volume 89, Mean Corpuscular Hemoglobin 29, Mean Corpuscular 

Hemoglobin Concent 32, Red Cell Distribution Width 14.6H, Platelet Count 302, 

Mean Platelet Volume 9.8, Immature Granulocyte % (Auto) 1, Neutrophils (%) 

(Auto) 58, Lymphocytes (%) (Auto) 30, Monocytes (%) (Auto) 10, Eosinophils (%) 

(Auto) 1, Basophils (%) (Auto) 1, Neutrophils # (Auto) 3.7, Lymphocytes # (Auto)

1.9, Monocytes # (Auto) 0.6, Eosinophils # (Auto) 0.1, Basophils # (Auto) 0.0, 

Immature Granulocyte # (Auto) 0.0, Sodium Level 137, Potassium Level 3.8, 

Chloride Level 104, Carbon Dioxide Level 22, Anion Gap 11, Blood Urea Nitrogen 

11, Creatinine 0.63, Estimat Glomerular Filtration Rate 92, BUN/Creatinine Ratio

17, Glucose Level 112H, Calcium Level 9.2, Corrected Calcium 9.4, Total 

Bilirubin 0.4, Aspartate Amino Transf (AST/SGOT) 13, Alanine Aminotransferase 

(ALT/SGPT) 13, Alkaline Phosphatase 75, Total Protein 6.8, Albumin 3.7, 

Triglycerides Level 73, Cholesterol Level 150, LDL Cholesterol Direct 87, VLDL 

Cholesterol 15, HDL Cholesterol 51


10/27/21 20:42: Glucometer 121H


10/27/21 21:11: Glucometer 112H


10/28/21 05:22: Glucometer 104


10/28/21 11:10: 


Urine Color YELLOW, Urine Clarity CLEAR, Urine pH 6.0, Urine Specific Gravity 

1.025H, Urine Protein TRACEH, Urine Glucose (UA) NEGATIVE, Urine Ketones 

NEGATIVE, Urine Nitrite NEGATIVE, Urine Bilirubin NEGATIVE, Urine Urobilinogen 

0.2, Urine Leukocyte Esterase 1+H, Urine RBC (Auto) TRACE-IH, Urine RBC 0-2, 

Urine WBC 50-100H, Urine Squamous Epithelial Cells 0-2, Urine Crystals NONE, 

Urine Bacteria MODERATEH, Urine Casts NONE, Urine Mucus NEGATIVE, Urine Culture 

Indicated YES


10/28/21 11:19: Glucometer 177H


10/28/21 15:07: Glucometer 149H


10/28/21 20:02: Glucometer 89


10/29/21 05:34: Glucometer 93


10/29/21 06:25: 


White Blood Count 5.7, Red Blood Count 4.71, Hemoglobin 13.5, Hematocrit 42, 

Mean Corpuscular Volume 90, Mean Corpuscular Hemoglobin 29, Mean Corpuscular 

Hemoglobin Concent 32, Red Cell Distribution Width 14.5, Platelet Count 308, 

Mean Platelet Volume 9.5, Immature Granulocyte % (Auto) 0, Neutrophils (%) 

(Auto) 60, Lymphocytes (%) (Auto) 27, Monocytes (%) (Auto) 11, Eosinophils (%) 

(Auto) 2, Basophils (%) (Auto) 1, Neutrophils # (Auto) 3.4, Lymphocytes # (Auto)

1.5, Monocytes # (Auto) 0.6, Eosinophils # (Auto) 0.1, Basophils # (Auto) 0.0, 

Immature Granulocyte # (Auto) 0.0, Sodium Level 139, Potassium Level 3.8, 

Chloride Level 104, Carbon Dioxide Level 24, Anion Gap 11, Blood Urea Nitrogen 

8, Creatinine 0.64, Estimat Glomerular Filtration Rate 90, BUN/Creatinine Ratio 

13, Glucose Level 108H, Calcium Level 8.8, Corrected Calcium 9.0, Total 

Bilirubin 0.4, Aspartate Amino Transf (AST/SGOT) 16, Alanine Aminotransferase 

(ALT/SGPT) 15, Alkaline Phosphatase 81, Total Protein 6.9, Albumin 3.8


10/29/21 11:27: Glucometer 101





Microbiology


10/28/21 Urine Culture - Final, Complete


           Escherichia coli





Pending Labs





Microbiology








 Date/Time


Source Procedure


Growth Status





 


 10/28/21 11:10


Urine Straight Cath, In/Out Urine Culture - Final


Escherichia coli Complete





Laboratory Tests


10/26/21 16:14: 


White Blood Count 7.8, Red Blood Count 4.70, Hemoglobin 13.5, Hematocrit 42, 

Mean Corpuscular Volume 90, Mean Corpuscular Hemoglobin 29, Mean Corpuscular 

Hemoglobin Concent 32, Red Cell Distribution Width 14.6, Platelet Count 378, 

Mean Platelet Volume 9.8, Immature Granulocyte % (Auto) 0, Neutrophils (%) 

(Auto) 55, Lymphocytes (%) (Auto) 33, Monocytes (%) (Auto) 10, Eosinophils (%) 

(Auto) 2, Basophils (%) (Auto) 1, Neutrophils # (Auto) 4.2, Lymphocytes # (Auto)

2.6, Monocytes # (Auto) 0.8, Eosinophils # (Auto) 0.1, Basophils # (Auto) 0.1, 

Immature Granulocyte # (Auto) 0.0, Prothrombin Time 13.6, INR Comment 1.0, 

Activated Partial Thromboplast Time 38, D-Dimer 0.37, Sodium Level 136, 

Potassium Level 4.2, Chloride Level 101, Carbon Dioxide Level 24, Anion Gap 11, 

Blood Urea Nitrogen 14, Creatinine 0.74, Estimat Glomerular Filtration Rate 77, 

BUN/Creatinine Ratio 19, Glucose Level 103, Glucometer 102, Calcium Level 9.8, 

Corrected Calcium 9.6, Total Bilirubin 0.4, Aspartate Amino Transf (AST/SGOT) 

15, Alanine Aminotransferase (ALT/SGPT) 17, Alkaline Phosphatase 91, Troponin I 

< 0.028, Total Protein 8.0, Albumin 4.3


10/26/21 16:33: 


Urine Color YELLOW, Urine Clarity CLEAR, Urine pH 6.0, Urine Specific Gravity 

1.020, Urine Protein NEGATIVE, Urine Glucose (UA) NEGATIVE, Urine Ketones 

NEGATIVE, Urine Nitrite NEGATIVE, Urine Bilirubin NEGATIVE, Urine Urobilinogen 

0.2, Urine Leukocyte Esterase TRACE, Urine RBC (Auto) NEGATIVE, Urine RBC NONE, 

Urine WBC 0-2, Urine Squamous Epithelial Cells RARE, Urine Crystals NONE, Urine 

Bacteria NEGATIVE, Urine Casts PRESENT, Urine Hyaline Casts RARE, Urine Mucus 

NEGATIVE, Urine Culture Indicated NO


10/27/21 05:07: 


White Blood Count 6.3, Red Blood Count 4.48, Hemoglobin 12.9, Hematocrit 40, 

Mean Corpuscular Volume 89, Mean Corpuscular Hemoglobin 29, Mean Corpuscular 

Hemoglobin Concent 32, Red Cell Distribution Width 14.6, Platelet Count 302, 

Mean Platelet Volume 9.8, Immature Granulocyte % (Auto) 1, Neutrophils (%) 

(Auto) 58, Lymphocytes (%) (Auto) 30, Monocytes (%) (Auto) 10, Eosinophils (%) 

(Auto) 1, Basophils (%) (Auto) 1, Neutrophils # (Auto) 3.7, Lymphocytes # (Auto)

1.9, Monocytes # (Auto) 0.6, Eosinophils # (Auto) 0.1, Basophils # (Auto) 0.0, 

Immature Granulocyte # (Auto) 0.0, Sodium Level 137, Potassium Level 3.8, 

Chloride Level 104, Carbon Dioxide Level 22, Anion Gap 11, Blood Urea Nitrogen 

11, Creatinine 0.63, Estimat Glomerular Filtration Rate 92, BUN/Creatinine Ratio

17, Glucose Level 112, Calcium Level 9.2, Corrected Calcium 9.4, Total Bilirubin

0.4, Aspartate Amino Transf (AST/SGOT) 13, Alanine Aminotransferase (ALT/SGPT) 

13, Alkaline Phosphatase 75, Total Protein 6.8, Albumin 3.7, Triglycerides Level

73, Cholesterol Level 150, LDL Cholesterol Direct 87, VLDL Cholesterol 15, HDL 

Cholesterol 51


10/27/21 20:42: Glucometer 121


10/27/21 21:11: Glucometer 112


10/28/21 05:22: Glucometer 104


10/28/21 11:10: 


Urine Color YELLOW, Urine Clarity CLEAR, Urine pH 6.0, Urine Specific Gravity 

1.025, Urine Protein TRACE, Urine Glucose (UA) NEGATIVE, Urine Ketones NEGATIVE,

Urine Nitrite NEGATIVE, Urine Bilirubin NEGATIVE, Urine Urobilinogen 0.2, Urine 

Leukocyte Esterase 1+, Urine RBC (Auto) TRACE-I, Urine RBC 0-2, Urine WBC 50-

100, Urine Squamous Epithelial Cells 0-2, Urine Crystals NONE, Urine Bacteria 

MODERATE, Urine Casts NONE, Urine Mucus NEGATIVE, Urine Culture Indicated YES


10/28/21 11:19: Glucometer 177


10/28/21 15:07: Glucometer 149


10/28/21 20:02: Glucometer 89


10/29/21 05:34: Glucometer 93


10/29/21 06:25: 


White Blood Count 5.7, Red Blood Count 4.71, Hemoglobin 13.5, Hematocrit 42, 

Mean Corpuscular Volume 90, Mean Corpuscular Hemoglobin 29, Mean Corpuscular 

Hemoglobin Concent 32, Red Cell Distribution Width 14.5, Platelet Count 308, 

Mean Platelet Volume 9.5, Immature Granulocyte % (Auto) 0, Neutrophils (%) 

(Auto) 60, Lymphocytes (%) (Auto) 27, Monocytes (%) (Auto) 11, Eosinophils (%) 

(Auto) 2, Basophils (%) (Auto) 1, Neutrophils # (Auto) 3.4, Lymphocytes # (Auto)

1.5, Monocytes # (Auto) 0.6, Eosinophils # (Auto) 0.1, Basophils # (Auto) 0.0, 

Immature Granulocyte # (Auto) 0.0, Sodium Level 139, Potassium Level 3.8, 

Chloride Level 104, Carbon Dioxide Level 24, Anion Gap 11, Blood Urea Nitrogen 

8, Creatinine 0.64, Estimat Glomerular Filtration Rate 90, BUN/Creatinine Ratio 

13, Glucose Level 108, Calcium Level 8.8, Corrected Calcium 9.0, Total Bilirubin

0.4, Aspartate Amino Transf (AST/SGOT) 16, Alanine Aminotransferase (ALT/SGPT) 

15, Alkaline Phosphatase 81, Total Protein 6.9, Albumin 3.8


10/29/21 11:27: Glucometer 101








Discharge


Home Medications:





Active Scripts


Active


Cefdinir 300 Mg Capsule 300 Mg PO BID


Reported


Fluoxetine HCl 40 Mg Capsule 40 Mg PO HS


Amlodipine Besylate 5 Mg Tablet 5 Mg PO DAILY


Carvedilol 3.125 Mg Tablet 3.125 Mg PO BID


Clopidogrel (Clopidogrel Bisulfate) 75 Mg Tablet 75 Mg PO DAILY


Atorvastatin Calcium 40 Mg Tablet 40 Mg PO DAILY


Aspirin EC (Aspirin) 81 Mg Tablet.dr 81 Mg PO DAILY


Symbicort 160-4.5 Mcg Inhaler (Budesonide/Formoterol Fumarate) 10.2 Gm 

Hfa.aer.ad 2 Puff IH BID


Benicar Hct 20-12.5 mg Tablet (Olmesartan/Hydrochlorothiazide) 1 Each Tablet 1 

Each PO HS


Senna-S Tablet (Sennosides/Docusate Sodium) 1 Each Tablet 1 Each PO BID


Famotidine 20 Mg Tablet 20 Mg PO DAILY





Instructions to patient/family


Please see electronic discharge instructions given to patient.





Diagnosis/Problems


Diagnosis/Problems





(1) CVA (cerebral vascular accident)


(2) Dysarthria





Clinical Quality Measures


Stroke:


Date of last known well:  Oct 26, 2021


Time of last known well:  10:00











ANDREY BAH DO                Oct 29, 2021 12:12

## 2021-10-29 NOTE — PROGRESS NOTE
TIERRA CEDEÑO MED STUDENT 10/29/21 1512:


Progress Note


Hospital course:


Lupe presented to the ED on 10/26 with complaints of stroke-like symptoms 

including L sided numbness/weakness, dysarthria, and dysphasia. Stroke protocol 

was activated but she was beyond the window for tPA treatment. Her symptoms 

mostly resolved by the next morning but continued to have residual dysarthria, 

dysphasia and confusion. Her mental status remained A&Ox1 to self, throughout 

her stay. CT/CTA head demonstrated chronic changes/encephalomalacia consistent 

with her prior stroke. MRI head showed a new infarct in her L posterior frontal 

lobe. TTE was completed showing a possible PFO. Cardiology was consulted and AMBER

was performed showing no PFO present. She developed symptoms consistent with UTI

during her stay and was started on Omnicef which helped resolve her symptoms by 

the time of discharge; she will finish her course of antibiotics as outpatient. 

She has no questions or concerns at time of discharge. She is being discharged 

to [facility] with [facility], today.





NANCY BAH DO 10/30/21 0656:


Supervisory-Addendum Brief


Verification & Attestation


Participated in pt care:  history, MDM, physical


Personally performed:  exam, history, MDM, supervision of care


Care discussed with:  Medical Student


Procedures:  n/a


Results interpretation:  Verified all documentation


Verification and Attestation of Medical Student E/M Service





A medical student performed and documented this service in my presence. I 

reviewed and verified all information documented by the medical student and made

modifications to such information, when appropriate. I personally performed the 

physical exam and medical decision making. 





 Nancy Bah, Oct 30, 2021,06:56











TIERRA CEDEÑO MED STUDENT       Oct 29, 2021 15:12


NANCY BAH DO                Oct 30, 2021 06:56

## 2021-10-29 NOTE — OCCUPATIONAL THER DAILY NOTE
OT Current Status-Daily Note


Subjective


Pt was seated in bed w/ HOB elevated upon OT arrival. Pt's daughter in room 

during tx session on this date.





Mental Status/Objective


Patient Orientation:  Person, Place, Time, Situation





ADL-Treatment


Therapy Code Descriptions/Definitions 





Functional [place] Measure:


0=Not Assessed/NA        4=Minimal Assistance


1=Total Assistance        5=Supervision or Setup


2=Maximal Assistance  6=Modified [place]


3=Moderate Assistance 7=Complete IndependenceSCALE: Activities may be completed 

with or without assistive devices.





6-Indepedent-patient completes the activity by him/herself with no assistance 

from a helper.


5-Set-up or Clean-up Assistance-helper sets up or cleans up; patient completes 

activity. [place] assists only prior to or  


    following the activity.


4-Supervision or Touching Assistance-helper provides verbal cues and/or 

touching/steadying and/or contact guard assistance as patient completes 

activity. Assistance may be provided   


    throughout the activity or intermittently.


3-Partial/Moderate Assistance-helper does LESS THAN HALF the effort. [place] 

lifts, holds or supports trunk or limbs, but provides less than half the effort.


2-Substantial/Maximal Assistance-helper does MORE THAN HALF the effort. [place] 

lifts or holds trunk or limbs and provides more than half the effort.


1-[place]-drknlw does ALL the effort. Patient does none of the effort to 

complete the activity. Or, the assistance of 2 or more helpers is required for 

the patient to complete the  


    activity.


If activity was not attempted, code reason:


7-Patient Refused.


9-Not Applicable-not attempted and the patient did not perform the activity 

before the current illness, exacerbation or injury.


10-Not Attempted due to Environmental Limitations-(lack of equipment, weather 

restraints, etc.).


88-Not Attempted due to Medical Conditions or Safety Concerns.


Oral Hygiene (QC):  4 (Pt performed SBA while standing sink side, no AE during 

task. )


Toileting Hygiene (QC):  6 (Pt performed IND during task w/ FWW for safety. )


Toilet Transfer (QC):  4 (Pt performed SBA during task w/ FWW for safety. )





Other Treatment


Pt was seated in bed w/ HOB elevated upon OT arrival. Pt.'s daughter in room 

during tx session on this date. Pt performed bed mobility supine to EOB, IND. Pt

transferred from sit to stand w/ FWW, SBA for safety. Pt performed functional 

mobility w/ FWW to toilet, SBA, for safety. Pt performed toileting, transferred 

from sit to stand w/ FWW, IND. Pt indicates she does not want to use AD and has 

not been using one, completed remainder of session without AD. Pt stood at sink 

for oral care, SBA, then returned to bed, SBA. Pt transferred sit to supine IND.

Post tx session, pt was seated in bed, w/ HOB elevated, call light within reach,

and all needs met.





Education


OT Patient Education:  Energy conservation, Progress toward Goal/Update tx plan,

Purpose of tx/functional activities


Teaching Recipient:  Patient


Teaching Methods:  Discussion


Response to Teaching:  Verbalize Understanding





OT Long Term Goals


Long Term Goals


Time Frame:  Nov 5, 2021


Oral Hygiene (QC):  5


Toileting Hygiene (QC):  6


Upper Body Dressing (QC):  5


Lower Body Dressing (QC):  5


On/Off Footwear (QC):  5


1=Demonstrate adherence to instructed precautions during ADL tasks.


2=Patient will verbalize/demonstrate understanding of assistive 

devices/modifications for ADL.


3=Patient will improve strength/tolerance for activity to enable patient to 

perform ADL's.





OT Education/Plan


Problem List/Assessment


Assessment:  Impaired I ADL's, Impaired Self-Care Skills





Discharge Recommendations


Plan/Recommendations:  Continue POC





Treatment Plan/Plan of Care


Patient would benefit from OT for education, treatment and training to promote i

ndependence in ADL's, mobility, safety and/or upper extremity function for 

ADL's.


Plan of Care:  ADL Retraining, Functional Mobility, UE Funct Exercise/Act, UE 

Neuromus Re-Ed/Coord, Visual/Perceptual Retrain


Treatment Duration:  Nov 5, 2021


Frequency:  5 times per week


Estimated Hrs Per Day:  .25 hour per day


Agreement:  Yes


Rehab Potential:  Good





Time/GCodes


Start Time:  10:54


Stop Time:  11:05


Total Time Billed (hr/min):  11


Billed Treatment Time


1 Visit, ADL











BERT RAINEY OT          Oct 29, 2021 12:04

## 2021-10-29 NOTE — CONSCIOUS SEDATION/ASA
Conscious Sedation Pre-Proced


Time


09:01





ASA Score


3


For ASA 3 and 4: Consider anesthesia and medical clearance. Also, for patients

with a history of failed moderate sedation consider anesthesia.

















Airway 


 


Lungs 


 


Heart 


 


 ASA score


 


 ASA 1: a normal healthy patient


 


 ASA 2:  a patient with a mild systemic disease (mid diabetes, controlled 

hypertension, obesity 


 


x ASA 3:  a patient with a severe systemic disease that limits activity  

(angina, COPD, prior Myocardial infarction)


 


 ASA 4:  a patient with an incapacitating disease that is a constant threat to 

life (CHF, renal failure)


 


 ASA 5:  a moribund patient not expected to survive 24 hrs.  (ruptured aneurysm)


 


 ASA 6:  a declared brain-dead patient whose organs are being harvested.


 


 For emergent operations, add the letter E after the classification











Mallampati Classification


Grade 3





Sedation Plan


Analgesia, Amnesia, Plan communicated to team members, Discussed options with 

patient/fam, Discussed risks with patient/fam


The patient is an appropriate candidate to undergo the planned procedure, 

sedation, and anesthesia.





The patient immediately re-assessed prior to indication.











BROOKE SHEA MD              Oct 29, 2021 09:02

## 2021-10-29 NOTE — D/C HH FACE TO FACE ORDER
D/C  Face to Face Orders


Reconcile Patient Problems


Problems Reviewed?:  Yes





Instructions for Patient


[facility], [phone]


Patient Instructions/FollowUp:  


Dr Petit in 2 weeks


Physician to follow Patient:  Damaso


Discharge Diet for Home:  ADA Diet, Cardiac Diet


Patient Problems:  


CVA recurrent


UTI





Patient Data-Allergies,Ht & Wt


Patient Allergies:  


Coded Allergies:  


     No Known Drug Allergies (Unverified , 11/12/20)





Home Health Need/Face to Face


Date of Face to Face:  Oct 29, 2021


Clinical Findings:  Generalized weakness and fatigue, Instability, Muscle 

weakness, Unsteady gait


I have seen Pt face-to-face:  Yes


Discharged To:  Home


Diagnosis/Conditions:  


CVA recurrent 


UTI


Patient is Homebound due to:  CognItive deficits, [place] fall risk due to 

instabilty, Muscle weakness


Homebound Status


   Due to the above stated illness, injury or surgical procedure (medical 

condition or diagnosis) and associated clinical findings, the patient is 

homebound because of his/her inability to leave home except with aid of a 

supportive device and/or person AND leaving the home requires a considerable and

taxing effort or is medically contraindicated.


Pt req the following assistanc:  [place]





Home Health Nursing Orders


Home Health Services Order:  Occupational Ther-Evaluate & Treat, Physical 

Therapy-Evaluate & Treat, Speech Language-Evaluate & Treat (Chin Quinones 

requested)





Home Health Infusion Therapy


Line Start Date:  Oct 26, 2021


Certify Stmt


I certify that this patient is under my care and that I, a nurse practitioner or

a physician; a assistant working with me, had a face to face encounter that -

meets the physician face to face encounter requirements with this patient as 

dated.











ANDREY PETIT DO                Oct 29, 2021 12:12

## 2022-10-20 ENCOUNTER — HOSPITAL ENCOUNTER (OUTPATIENT)
Dept: HOSPITAL 75 - RAD | Age: 77
End: 2022-10-20
Attending: NURSE PRACTITIONER
Payer: MEDICARE

## 2022-10-20 DIAGNOSIS — Z86.73: ICD-10-CM

## 2022-10-20 DIAGNOSIS — G31.9: Primary | ICD-10-CM

## 2022-10-20 PROCEDURE — 70450 CT HEAD/BRAIN W/O DYE: CPT

## 2022-10-20 NOTE — DIAGNOSTIC IMAGING REPORT
Indication: Unwitnessed fall and confusion.



Technique: Multiple contiguous axial images were obtained through

the brain without the use of intravenous contrast. Auto Exposure

Controls were utilized during the CT exam to meet ALARA standards

for radiation dose reduction. 



Comparison made to 10/26/2021



There are diffuse atrophic changes. There is a large old left

occipital infarct in the PCA territory. Smaller infarcts are seen

in the left frontoparietal region and posterior frontal region.

There is a small old infarct in the right posterior frontal

region. There is no hemorrhage or mass effect or midline shift.

Ventricles are normal in size for age. Calvarial windows appear

unremarkable.



IMPRESSION:



Atrophic changes and chronic changes with multiple old infarcts

as above. No acute hemorrhage or calvarial fracture.



Dictated by: 



  Dictated on workstation # [id number]

## 2022-12-21 ENCOUNTER — HOSPITAL ENCOUNTER (EMERGENCY)
Dept: HOSPITAL 75 - ER | Age: 77
Discharge: HOME | End: 2022-12-21
Payer: COMMERCIAL

## 2022-12-21 VITALS — HEIGHT: 63.78 IN | BODY MASS INDEX: 22.6 KG/M2 | WEIGHT: 130.73 LBS

## 2022-12-21 VITALS — DIASTOLIC BLOOD PRESSURE: 70 MMHG | SYSTOLIC BLOOD PRESSURE: 150 MMHG

## 2022-12-21 DIAGNOSIS — Z20.822: ICD-10-CM

## 2022-12-21 DIAGNOSIS — F03.90: ICD-10-CM

## 2022-12-21 DIAGNOSIS — N39.0: Primary | ICD-10-CM

## 2022-12-21 LAB
ALBUMIN SERPL-MCNC: 3.6 GM/DL (ref 3.2–4.5)
ALP SERPL-CCNC: 66 U/L (ref 40–136)
ALT SERPL-CCNC: 15 U/L (ref 0–55)
APTT BLD: 27 SEC (ref 24–35)
APTT PPP: YELLOW S
BACTERIA #/AREA URNS HPF: NEGATIVE /HPF
BASOPHILS # BLD AUTO: 0 10^3/UL (ref 0–0.1)
BASOPHILS NFR BLD AUTO: 0 % (ref 0–10)
BILIRUB SERPL-MCNC: 0.5 MG/DL (ref 0.1–1)
BILIRUB UR QL STRIP: NEGATIVE
BUN/CREAT SERPL: 11
CALCIUM SERPL-MCNC: 8.6 MG/DL (ref 8.5–10.1)
CHLORIDE SERPL-SCNC: 104 MMOL/L (ref 98–107)
CO2 SERPL-SCNC: 22 MMOL/L (ref 21–32)
CREAT SERPL-MCNC: 0.63 MG/DL (ref 0.6–1.3)
D DIMER PPP FEU-MCNC: 0.47 UG/ML (ref 0–0.49)
EOSINOPHIL # BLD AUTO: 0 10^3/UL (ref 0–0.3)
EOSINOPHIL NFR BLD AUTO: 0 % (ref 0–10)
FIBRINOGEN PPP-MCNC: (no result) MG/DL
GFR SERPLBLD BASED ON 1.73 SQ M-ARVRAT: 92 ML/MIN
GLUCOSE SERPL-MCNC: 116 MG/DL (ref 70–105)
GLUCOSE UR STRIP-MCNC: NEGATIVE MG/DL
HCT VFR BLD CALC: 38 % (ref 35–52)
HGB BLD-MCNC: 12.8 G/DL (ref 11.5–16)
INR PPP: 1 (ref 0.8–1.4)
KETONES UR QL STRIP: (no result)
LEUKOCYTE ESTERASE UR QL STRIP: (no result)
LIPASE SERPL-CCNC: 22 U/L (ref 8–78)
LYMPHOCYTES # BLD AUTO: 1.6 10^3/UL (ref 1–4)
LYMPHOCYTES NFR BLD AUTO: 22 % (ref 12–44)
MANUAL DIFFERENTIAL PERFORMED BLD QL: NO
MCH RBC QN AUTO: 29 PG (ref 25–34)
MCHC RBC AUTO-ENTMCNC: 34 G/DL (ref 32–36)
MCV RBC AUTO: 87 FL (ref 80–99)
MONOCYTES # BLD AUTO: 0.5 10^3/UL (ref 0–1)
MONOCYTES NFR BLD AUTO: 7 % (ref 0–12)
NEUTROPHILS # BLD AUTO: 5.3 10^3/UL (ref 1.8–7.8)
NEUTROPHILS NFR BLD AUTO: 70 % (ref 42–75)
NITRITE UR QL STRIP: NEGATIVE
PH UR STRIP: 6.5 [PH] (ref 5–9)
PLATELET # BLD: 354 10^3/UL (ref 130–400)
PMV BLD AUTO: 10.4 FL (ref 9–12.2)
POTASSIUM SERPL-SCNC: 3.4 MMOL/L (ref 3.6–5)
PROT SERPL-MCNC: 6.8 GM/DL (ref 6.4–8.2)
PROT UR QL STRIP: NEGATIVE
PROTHROMBIN TIME: 13.4 SEC (ref 12.2–14.7)
RBC #/AREA URNS HPF: (no result) /HPF
SODIUM SERPL-SCNC: 140 MMOL/L (ref 135–145)
SP GR UR STRIP: 1.01 (ref 1.02–1.02)
SQUAMOUS #/AREA URNS HPF: (no result) /HPF
WBC # BLD AUTO: 7.6 10^3/UL (ref 4.3–11)
WBC #/AREA URNS HPF: (no result) /HPF

## 2022-12-21 PROCEDURE — 85025 COMPLETE CBC W/AUTO DIFF WBC: CPT

## 2022-12-21 PROCEDURE — 84145 PROCALCITONIN (PCT): CPT

## 2022-12-21 PROCEDURE — 71045 X-RAY EXAM CHEST 1 VIEW: CPT

## 2022-12-21 PROCEDURE — 84484 ASSAY OF TROPONIN QUANT: CPT

## 2022-12-21 PROCEDURE — 85730 THROMBOPLASTIN TIME PARTIAL: CPT

## 2022-12-21 PROCEDURE — 85610 PROTHROMBIN TIME: CPT

## 2022-12-21 PROCEDURE — 85379 FIBRIN DEGRADATION QUANT: CPT

## 2022-12-21 PROCEDURE — 80053 COMPREHEN METABOLIC PANEL: CPT

## 2022-12-21 PROCEDURE — 83605 ASSAY OF LACTIC ACID: CPT

## 2022-12-21 PROCEDURE — 36415 COLL VENOUS BLD VENIPUNCTURE: CPT

## 2022-12-21 PROCEDURE — 83690 ASSAY OF LIPASE: CPT

## 2022-12-21 PROCEDURE — 87636 SARSCOV2 & INF A&B AMP PRB: CPT

## 2022-12-21 PROCEDURE — 81000 URINALYSIS NONAUTO W/SCOPE: CPT

## 2022-12-21 NOTE — DIAGNOSTIC IMAGING REPORT
INDICATION: Weakness.



COMPARISON: 10/26/2021.



FINDINGS: Sternal wires are midline. Lungs are clear. No failure,

effusion, or pneumothorax.



IMPRESSION: No acute appearing abnormality.



Dictated by: 



  Dictated on workstation # [id number]

## 2022-12-21 NOTE — ED COUGH/URI
General


Stated Complaint:  FLU-LIKE SYMPTOMS





History of Present Illness


Date Seen by Provider:  Dec 21, 2022


Time Seen by Provider:  11:00


Initial Comments


77 yr F with PMH of Dementia/ CVA ( )/ CAD with CABG, with recent Influenza 

A and has completed a course of Tamiflu. The patient went home and sent her here

via EMS with complaints of ongoing fever, cough, nausea and vomiting for the 

past 3 weeks.  In the ER patient has not coughed and she was afebrile.  Patient 

is very weepy, crying, confused due to the dementia.  Patient is alert and 

oriented although she cannot answer questions regarding her past medical history

or any past information.  Denies diarrhea, chest pain, shortness of breath, 

headache.





Allergies and Home Medications


Allergies


Coded Allergies:  


     No Known Drug Allergies (Unverified , 22)





Patient Home Medication List


Home Medication List Reviewed:  Yes


Amlodipine Besylate (Amlodipine Besylate) 5 Mg Tablet, 5 MG PO DAILY, (Reported)


   Entered as Reported by: ANJELICA PEPPER on 21 08


Aspirin (Aspirin EC) 81 Mg Tablet.dr, 81 MG PO DAILY, (Reported)


   Entered as Reported by: ANJELICA PEPPER on 21 08


Atorvastatin Calcium (Atorvastatin Calcium) 40 Mg Tablet, 40 MG PO DAILY, 

(Reported)


   Entered as Reported by: ANJELICA PEPPER on 21 08


Budesonide/Formoterol Fumarate (Symbicort 160-4.5 Mcg Inhaler) 10.2 Gm 

Hfa.aer.ad, 2 PUFF IH BID, (Reported)


   Entered as Reported by: ANJELICA PEPPER on 21 08


Carvedilol (Carvedilol) 3.125 Mg Tablet, 3.125 MG PO BID, (Reported)


   Entered as Reported by: ANJELICA PEPPER on 21 08


Cefdinir (Cefdinir) 300 Mg Capsule, 300 MG PO BID


   Prescribed by: ANDREY BAH on 10/29/21 1211


Clopidogrel Bisulfate (Clopidogrel) 75 Mg Tablet, 75 MG PO DAILY, (Reported)


   Entered as Reported by: ANJELICA PEPPER on 21 08


Famotidine (Famotidine) 20 Mg Tablet, 20 MG PO DAILY, (Reported)


   Entered as Reported by: ANJELICA PEPPER on 21 0853


Fluoxetine HCl (Fluoxetine HCl) 40 Mg Capsule, 40 MG PO HS, (Reported)


   Entered as Reported by: ANJELICA PEPPER on 21 0903


Olmesartan/Hydrochlorothiazide (Benicar Hct 20-12.5 mg Tablet) 1 Each Tablet, 1 

EACH PO HS, (Reported)


   Entered as Reported by: ANJELICA PEPPER on 21 08


Sennosides/Docusate Sodium (Senna-S Tablet) 1 Each Tablet, 1 EACH PO BID, 

(Reported)


   Entered as Reported by: ANJELICA PEPPER on 21 08





Review of Systems


Review of Systems


Constitutional:  see HPI


EENTM:  no symptoms reported


Respiratory:  no symptoms reported


Cardiovascular:  no symptoms reported


Gastrointestinal:  no symptoms reported


Genitourinary:  no symptoms reported


Musculoskeletal:  no symptoms reported


Skin:  no symptoms reported


Psychiatric/Neurological:  Anxiety, Depressed, Emotional Problems


Hematologic/Lymphatic:  No Symptoms Reported


Immunological/Allergic:  no symptoms reported





Past Medical-Social-Family Hx


Immunizations Up To Date


Tetanus Booster (TDap):  Unknown


First/Initial COVID19 Vaccinat:  


Second COVID19 Vaccination Joey:  


Third COVID19 Vaccination Date:  2021





Seasonal Allergies


Seasonal Allergies:  No





Past Medical History


Surgeries:  Yes


Vascular Surgery


Respiratory:  Yes


COPD


Currently Using CPAP:  No


Currently Using BIPAP:  No


Cardiac:  Yes (STENTS, BYPASS )


Coronary Artery Disease, High Cholesterol, Hypertension


Neurological:  No


Stroke


Reproductive Disorders:  No


Sexually Transmitted Disease:  No


HIV/AIDS:  No


Genitourinary:  No


Bladder Infection, Renal Failure


Gastrointestinal:  Yes


Gastroesophageal Reflux


Musculoskeletal:  Yes


Arthritis


Endocrine:  Yes


Diabetes, Non-Insulin dep


HEENT:  No


Loss of Vision:  Right


Hearing Impairment:  Denies


Cancer:  No


Psychosocial:  No


Depression


Integumentary:  No


Blood Disorders:  No





Family Medical History





Patient reports no known family medical history.


Heart Disease, Diabetes, Hypertension





Physical Exam





Vital Signs - First Documented




















Capillary Refill :


Height: '"


Weight: lbs. oz. kg; 22.45 BMI


Method:


General Appearance:  mild distress (Patient is crying and upset and cannot rem

ember her past medical history)


HEENT:  PERRL/EOMI, normal ENT inspection


Neck:  non-tender, normal inspection


Respiratory:  chest non-tender, lungs clear, normal breath sounds, no 

respiratory distress


Cardiovascular:  regular rate, rhythm


Gastrointestinal:  non tender, soft


Extremities:  normal range of motion


Neurologic/Psychiatric:  CNs II-XII nml as tested, no motor/sensory deficits, 

alert, other (Oriented x2)


Skin:  normal color


Lymphatic:  no adenopathy





Focused Exam


Lactate Level


22 10:45: Lactic Acid Level 1.25





Lactic Acid Level





Laboratory Tests








Test


 22


10:45


 


Lactic Acid Level


 1.25 MMOL/L


(0.50-2.00)











Progress/Results/Core Measures


Suspected Sepsis


SIRS


Temperature: 


Pulse:  


Respiratory Rate: 


 


Laboratory Tests


22 10:45: White Blood Count 7.6


Blood Pressure  / 


Mean: 


 





22 10:45: Lactic Acid Level 1.25


Laboratory Tests


22 10:45: 


Creatinine 0.63, Platelet Count 354, Total Bilirubin 0.5


22 13:24: INR Comment 1.0








Results/Orders


Lab Results





Laboratory Tests








Test


 22


10:45 22


13:24 22


13:43 Range/Units


 


 


White Blood Count


 7.6 


 


 


 4.3-11.0


10^3/uL


 


Red Blood Count


 4.40 


 


 


 3.80-5.11


10^6/uL


 


Hemoglobin 12.8    11.5-16.0  g/dL


 


Hematocrit 38    35-52  %


 


Mean Corpuscular Volume 87    80-99  fL


 


Mean Corpuscular Hemoglobin 29    25-34  pg


 


Mean Corpuscular Hemoglobin


Concent 34 


 


 


 32-36  g/dL





 


Red Cell Distribution Width 13.8    10.0-14.5  %


 


Platelet Count


 354 


 


 


 130-400


10^3/uL


 


Mean Platelet Volume 10.4    9.0-12.2  fL


 


Immature Granulocyte % (Auto) 0     %


 


Neutrophils (%) (Auto) 70    42-75  %


 


Lymphocytes (%) (Auto) 22    12-44  %


 


Monocytes (%) (Auto) 7    0-12  %


 


Eosinophils (%) (Auto) 0    0-10  %


 


Basophils (%) (Auto) 0    0-10  %


 


Neutrophils # (Auto)


 5.3 


 


 


 1.8-7.8


10^3/uL


 


Lymphocytes # (Auto)


 1.6 


 


 


 1.0-4.0


10^3/uL


 


Monocytes # (Auto)


 0.5 


 


 


 0.0-1.0


10^3/uL


 


Eosinophils # (Auto)


 0.0 


 


 


 0.0-0.3


10^3/uL


 


Basophils # (Auto)


 0.0 


 


 


 0.0-0.1


10^3/uL


 


Immature Granulocyte # (Auto)


 0.0 


 


 


 0.0-0.1


10^3/uL


 


Sodium Level 140    135-145  MMOL/L


 


Potassium Level 3.4 L   3.6-5.0  MMOL/L


 


Chloride Level 104      MMOL/L


 


Carbon Dioxide Level 22    21-32  MMOL/L


 


Anion Gap 14    5-14  MMOL/L


 


Blood Urea Nitrogen 7    7-18  MG/DL


 


Creatinine


 0.63 


 


 


 0.60-1.30


MG/DL


 


Estimat Glomerular Filtration


Rate 92 


 


 


  





 


BUN/Creatinine Ratio 11     


 


Glucose Level 116 H     MG/DL


 


Lactic Acid Level


 1.25 


 


 


 0.50-2.00


MMOL/L


 


Calcium Level 8.6    8.5-10.1  MG/DL


 


Corrected Calcium 8.9    8.5-10.1  MG/DL


 


Total Bilirubin 0.5    0.1-1.0  MG/DL


 


Aspartate Amino Transf


(AST/SGOT) 19 


 


 


 5-34  U/L





 


Alanine Aminotransferase


(ALT/SGPT) 15 


 


 


 0-55  U/L





 


Alkaline Phosphatase 66      U/L


 


Troponin I < 0.028    <0.028  NG/ML


 


Total Protein 6.8    6.4-8.2  GM/DL


 


Albumin 3.6    3.2-4.5  GM/DL


 


Lipase 22    8-78  U/L


 


Procalcitonin 0.01    <0.10  NG/ML


 


Influenza Type A (RT-PCR) Not Detected    Not Detecte  


 


Influenza Type B (RT-PCR) Not Detected    Not Detecte  


 


SARS-CoV-2 RNA (RT-PCR) Not Detected    Not Detecte  


 


Prothrombin Time  13.4   12.2-14.7  SEC


 


INR Comment  1.0   0.8-1.4  


 


Activated Partial


Thromboplast Time 


 27 


 


 24-35  SEC





 


D-Dimer


 


 0.47 


 


 0.00-0.49


UG/ML


 


Urine Color   YELLOW   


 


Urine Clarity   SL CLOUDY   


 


Urine pH   6.5  5-9  


 


Urine Specific Gravity   1.015 L 1.016-1.022  


 


Urine Protein   NEGATIVE  NEGATIVE  


 


Urine Glucose (UA)   NEGATIVE  NEGATIVE  


 


Urine Ketones   3+ H NEGATIVE  


 


Urine Nitrite   NEGATIVE  NEGATIVE  


 


Urine Bilirubin   NEGATIVE  NEGATIVE  


 


Urine Urobilinogen   0.2  < = 1.0  MG/DL


 


Urine Leukocyte Esterase   TRACE H NEGATIVE  


 


Urine RBC (Auto)   NEGATIVE  NEGATIVE  


 


Urine RBC   NONE   /HPF


 


Urine WBC   0-2   /HPF


 


Urine Squamous Epithelial


Cells 


 


 0-2 


  /HPF





 


Urine Crystals   NONE   /LPF


 


Urine Bacteria   NEGATIVE   /HPF


 


Urine Casts   NONE   /LPF


 


Urine Mucus   NEGATIVE   /LPF


 


Urine Culture Indicated   NO   








My Orders





Orders - STEVEN OGLESBY MD


Covid 19 Inhouse Test (22 10:42)


Influenza A And B By Pcr (22 10:42)


Cbc With Automated Diff (22 11:55)


Comprehensive Metabolic Panel (22 11:55)


Fibrin Degradation Products (22 11:55)


Lactic Acid Analyzer (22 11:55)


Lipase (22 11:55)


Procalcitonin (Pct) (22 11:55)


Protime With Inr (22 11:55)


Partial Thromboplastin Time (22 11:55)


Ua Culture If Indicated (22 11:55)


Troponin I Brooks (22 11:55)


Chest 1 View, Ap/Pa Only (22 11:56)


Ed Iv/Invasive Line Start (22 11:56)


Ns Iv 1000 Ml (Sodium Chloride 0.9%) (22 12:00)





Vital Signs/I&O











 22





 10:45 10:45


 


Temp 36.6 36.6


 


Pulse 61 61


 


Resp 22 22


 


B/P (MAP) 143/101 (115) 143/101


 


Pulse Ox 100 100


 


O2 Delivery Room Air 





Capillary Refill :


Progress Note :  


Progress Note


DEMENTIA SYMPTOMS WITH DEPRESSION AND ANXIETY: UTI


- Labs and CXR normsl


- COVID test and Rapid Flu test negative


- UA is positive for LE


- Nitrofurantoin 100mg bid for 5 days


, with first tab in ER


- Follow up with PCP in the next 3 to 7 days, and would benefit from a 

neuropsych evaluation.


-Advised adequate hydration


-The patient was seen in the ED, and treated appropriately to presentation at a 

specific point in time. Patient is informed that there is a possibility that 

disease and illness can evolve and change in acuity rapidly or slowly after 

patient is discharged from the ER. Precautionary advice given to the patient for

immediate return to ER if symptoms worsen or do not resolve, and to seek 

emergency care sooner rather than later. Pt also advised on the importance of 

PCP follow up and compliance with management and follow up plan with PCP and/or 

specialist, as this is part of the management plan. Pt verbally expressed und

erstanding.





Diagnostic Imaging





   Diagonstic Imaging:  Xray


   Plain Films/CT/US/NM/MRI:  chest


Comments


                 [facility].


                                [place], [place]





NAME:   TAYLOR CENTENO


[facility] REC#:   [id number]


ACCOUNT#:   [id number]


PT STATUS:   REG ER


:   1945


PHYSICIAN:   STEVEN OGLESBY MD


ADMIT DATE:   22/ER


                                   ***Draft***


Date of Exam:22





CHEST 1 VIEW, AP/PA ONLY








INDICATION: Weakness.





COMPARISON: 10/26/2021.





FINDINGS: Sternal wires are midline. Lungs are clear. No failure,


effusion, or pneumothorax.





IMPRESSION: No acute appearing abnormality.





  Dictated on workstation # [id number]








Dict:   22 1230


Trans:   22 1234


 [id number]





Interpreted by:     STEPHEN JESUS


Electronically signed by:





Departure


Impression





   Primary Impression:  


   UTI (urinary tract infection)


   Additional Impression:  


   Dementia


Disposition:  01 HOME, SELF-CARE


Condition:  Stable





Departure-Patient Inst.


Referrals:  


ANDREY BAH DO (PCP/Family)


Primary Care Physician


Patient Instructions:  Urinary Tract Infections in Adults, Urinary Tract 

Infection, Adult ED, Tips for Caregivers of People With Alzheimer Disease, 

Dementia ED





Add. Discharge Instructions:  


- Nitrofurantoin 100mg bid for 5 days


- Follow up with PCP in the next 3 to 7 days, and would benefit from a 

neuropsych evaluation.


-Advised adequate hydration


Scripts


Nitrofurantoin Macrocrystal (Nitrofurantoin) 100 Mg Capsule


100 MG PO BID for 5 Days, #10 CAP


   Prov: STEVEN OGLESBY MD         22











STEVEN OGLESBY MD              Dec 21, 2022 11:20